# Patient Record
Sex: MALE | Race: BLACK OR AFRICAN AMERICAN | NOT HISPANIC OR LATINO | Employment: OTHER | URBAN - METROPOLITAN AREA
[De-identification: names, ages, dates, MRNs, and addresses within clinical notes are randomized per-mention and may not be internally consistent; named-entity substitution may affect disease eponyms.]

---

## 2018-01-01 ENCOUNTER — ANESTHESIA (OUTPATIENT)
Dept: INTENSIVE CARE | Facility: HOSPITAL | Age: 58
DRG: 870 | End: 2018-01-01
Payer: MEDICAID

## 2018-01-01 ENCOUNTER — HOSPITAL ENCOUNTER (INPATIENT)
Facility: HOSPITAL | Age: 58
LOS: 16 days | DRG: 870 | End: 2018-02-28
Attending: EMERGENCY MEDICINE | Admitting: HOSPITALIST
Payer: MEDICAID

## 2018-01-01 ENCOUNTER — ANESTHESIA EVENT (OUTPATIENT)
Dept: INTENSIVE CARE | Facility: HOSPITAL | Age: 58
DRG: 870 | End: 2018-01-01
Payer: MEDICAID

## 2018-01-01 VITALS
HEART RATE: 86 BPM | DIASTOLIC BLOOD PRESSURE: 59 MMHG | WEIGHT: 244.94 LBS | RESPIRATION RATE: 35 BRPM | TEMPERATURE: 98 F | OXYGEN SATURATION: 81 % | HEIGHT: 67 IN | SYSTOLIC BLOOD PRESSURE: 116 MMHG | BODY MASS INDEX: 38.44 KG/M2

## 2018-01-01 DIAGNOSIS — J96.02 ACUTE RESPIRATORY FAILURE WITH HYPOXIA AND HYPERCARBIA: ICD-10-CM

## 2018-01-01 DIAGNOSIS — J96.01 ACUTE RESPIRATORY FAILURE WITH HYPOXIA AND HYPERCARBIA: ICD-10-CM

## 2018-01-01 DIAGNOSIS — J18.9 PNEUMONIA OF LEFT LOWER LOBE DUE TO INFECTIOUS ORGANISM: Primary | ICD-10-CM

## 2018-01-01 DIAGNOSIS — E87.1 HYPONATREMIA: ICD-10-CM

## 2018-01-01 DIAGNOSIS — E87.6 HYPOKALEMIA: ICD-10-CM

## 2018-01-01 DIAGNOSIS — D61.818 PANCYTOPENIA: ICD-10-CM

## 2018-01-01 DIAGNOSIS — R94.31 QT PROLONGATION: ICD-10-CM

## 2018-01-01 DIAGNOSIS — D64.9 ANEMIA, UNSPECIFIED TYPE: ICD-10-CM

## 2018-01-01 DIAGNOSIS — R09.02 HYPOXIA: ICD-10-CM

## 2018-01-01 DIAGNOSIS — I50.30 (HFPEF) HEART FAILURE WITH PRESERVED EJECTION FRACTION: ICD-10-CM

## 2018-01-01 LAB
ABO + RH BLD: NORMAL
ACANTHOCYTES BLD QL SMEAR: PRESENT
ACANTHOCYTES BLD QL SMEAR: PRESENT
ACID FAST MOD KINY STN SPEC: NORMAL
ALBUMIN SERPL BCP-MCNC: 1.7 G/DL
ALBUMIN SERPL BCP-MCNC: 1.7 G/DL
ALBUMIN SERPL BCP-MCNC: 1.8 G/DL
ALBUMIN SERPL BCP-MCNC: 1.9 G/DL
ALBUMIN SERPL BCP-MCNC: 1.9 G/DL
ALBUMIN SERPL BCP-MCNC: 2 G/DL
ALBUMIN SERPL BCP-MCNC: 2 G/DL
ALBUMIN SERPL BCP-MCNC: 2.2 G/DL
ALBUMIN SERPL BCP-MCNC: 2.2 G/DL
ALBUMIN SERPL BCP-MCNC: 2.3 G/DL
ALBUMIN SERPL BCP-MCNC: 2.4 G/DL
ALBUMIN SERPL BCP-MCNC: 2.6 G/DL
ALBUMIN SERPL BCP-MCNC: 2.8 G/DL
ALBUMIN SERPL BCP-MCNC: 2.8 G/DL
ALBUMIN SERPL BCP-MCNC: 3.2 G/DL
ALLENS TEST: ABNORMAL
ALP SERPL-CCNC: 100 U/L
ALP SERPL-CCNC: 40 U/L
ALP SERPL-CCNC: 41 U/L
ALP SERPL-CCNC: 42 U/L
ALP SERPL-CCNC: 43 U/L
ALP SERPL-CCNC: 43 U/L
ALP SERPL-CCNC: 48 U/L
ALP SERPL-CCNC: 49 U/L
ALP SERPL-CCNC: 49 U/L
ALP SERPL-CCNC: 50 U/L
ALP SERPL-CCNC: 57 U/L
ALP SERPL-CCNC: 62 U/L
ALP SERPL-CCNC: 64 U/L
ALP SERPL-CCNC: 68 U/L
ALP SERPL-CCNC: 75 U/L
ALP SERPL-CCNC: 81 U/L
ALP SERPL-CCNC: 97 U/L
ALT SERPL W/O P-5'-P-CCNC: 14 U/L
ALT SERPL W/O P-5'-P-CCNC: 15 U/L
ALT SERPL W/O P-5'-P-CCNC: 16 U/L
ALT SERPL W/O P-5'-P-CCNC: 16 U/L
ALT SERPL W/O P-5'-P-CCNC: 17 U/L
ALT SERPL W/O P-5'-P-CCNC: 18 U/L
ALT SERPL W/O P-5'-P-CCNC: 19 U/L
ALT SERPL W/O P-5'-P-CCNC: 20 U/L
ALT SERPL W/O P-5'-P-CCNC: 22 U/L
ALT SERPL W/O P-5'-P-CCNC: 24 U/L
ALT SERPL W/O P-5'-P-CCNC: 24 U/L
ALT SERPL W/O P-5'-P-CCNC: 25 U/L
ALT SERPL W/O P-5'-P-CCNC: 30 U/L
ALT SERPL W/O P-5'-P-CCNC: 35 U/L
AMMONIA PLAS-SCNC: 49 UMOL/L
ANION GAP SERPL CALC-SCNC: 10 MMOL/L
ANION GAP SERPL CALC-SCNC: 11 MMOL/L
ANION GAP SERPL CALC-SCNC: 11 MMOL/L
ANION GAP SERPL CALC-SCNC: 12 MMOL/L
ANION GAP SERPL CALC-SCNC: 14 MMOL/L
ANION GAP SERPL CALC-SCNC: 15 MMOL/L
ANION GAP SERPL CALC-SCNC: 16 MMOL/L
ANION GAP SERPL CALC-SCNC: 17 MMOL/L
ANION GAP SERPL CALC-SCNC: 17 MMOL/L
ANION GAP SERPL CALC-SCNC: 18 MMOL/L
ANION GAP SERPL CALC-SCNC: 21 MMOL/L
ANION GAP SERPL CALC-SCNC: 4 MMOL/L
ANION GAP SERPL CALC-SCNC: 6 MMOL/L
ANION GAP SERPL CALC-SCNC: 7 MMOL/L
ANION GAP SERPL CALC-SCNC: 8 MMOL/L
ANION GAP SERPL CALC-SCNC: 9 MMOL/L
ANISOCYTOSIS BLD QL SMEAR: SLIGHT
AST SERPL-CCNC: 106 U/L
AST SERPL-CCNC: 111 U/L
AST SERPL-CCNC: 114 U/L
AST SERPL-CCNC: 114 U/L
AST SERPL-CCNC: 115 U/L
AST SERPL-CCNC: 118 U/L
AST SERPL-CCNC: 125 U/L
AST SERPL-CCNC: 146 U/L
AST SERPL-CCNC: 160 U/L
AST SERPL-CCNC: 162 U/L
AST SERPL-CCNC: 177 U/L
AST SERPL-CCNC: 74 U/L
AST SERPL-CCNC: 75 U/L
AST SERPL-CCNC: 81 U/L
AST SERPL-CCNC: 90 U/L
BACTERIA #/AREA URNS HPF: ABNORMAL /HPF
BACTERIA #/AREA URNS HPF: ABNORMAL /HPF
BACTERIA BAL AEROBE CULT: NORMAL
BACTERIA BLD CULT: NORMAL
BACTERIA SPEC AEROBE CULT: NORMAL
BACTERIA SPEC AEROBE CULT: NORMAL
BACTERIA UR CULT: NO GROWTH
BACTERIA UR CULT: NORMAL
BASOPHILS # BLD AUTO: 0 K/UL
BASOPHILS # BLD AUTO: 0.01 K/UL
BASOPHILS # BLD AUTO: 0.02 K/UL
BASOPHILS # BLD AUTO: 0.02 K/UL
BASOPHILS # BLD AUTO: 0.03 K/UL
BASOPHILS # BLD AUTO: 0.03 K/UL
BASOPHILS # BLD AUTO: 0.04 K/UL
BASOPHILS # BLD AUTO: 0.04 K/UL
BASOPHILS # BLD AUTO: 0.08 K/UL
BASOPHILS # BLD AUTO: ABNORMAL K/UL
BASOPHILS NFR BLD: 0 %
BASOPHILS NFR BLD: 0.1 %
BASOPHILS NFR BLD: 0.2 %
BASOPHILS NFR BLD: 0.2 %
BASOPHILS NFR BLD: 0.4 %
BASOPHILS NFR BLD: 0.8 %
BASOPHILS NFR BLD: 0.9 %
BASOPHILS NFR BLD: 1.2 %
BASOPHILS NFR BLD: 2.5 %
BILIRUB SERPL-MCNC: 0.2 MG/DL
BILIRUB SERPL-MCNC: 0.3 MG/DL
BILIRUB SERPL-MCNC: 0.5 MG/DL
BILIRUB SERPL-MCNC: 0.7 MG/DL
BILIRUB SERPL-MCNC: 0.8 MG/DL
BILIRUB SERPL-MCNC: 0.8 MG/DL
BILIRUB SERPL-MCNC: 1 MG/DL
BILIRUB SERPL-MCNC: 1.2 MG/DL
BILIRUB UR QL STRIP: NEGATIVE
BILIRUB UR QL STRIP: NEGATIVE
BLD GP AB SCN CELLS X3 SERPL QL: NORMAL
BLD PROD TYP BPU: NORMAL
BLOOD UNIT EXPIRATION DATE: NORMAL
BLOOD UNIT TYPE CODE: 5100
BLOOD UNIT TYPE CODE: 7300
BLOOD UNIT TYPE: NORMAL
BNP SERPL-MCNC: 33 PG/ML
BNP SERPL-MCNC: 40 PG/ML
BUN SERPL-MCNC: 10 MG/DL
BUN SERPL-MCNC: 112 MG/DL
BUN SERPL-MCNC: 127 MG/DL
BUN SERPL-MCNC: 13 MG/DL
BUN SERPL-MCNC: 14 MG/DL
BUN SERPL-MCNC: 142 MG/DL
BUN SERPL-MCNC: 168 MG/DL
BUN SERPL-MCNC: 186 MG/DL
BUN SERPL-MCNC: 186 MG/DL
BUN SERPL-MCNC: 28 MG/DL
BUN SERPL-MCNC: 41 MG/DL
BUN SERPL-MCNC: 42 MG/DL
BUN SERPL-MCNC: 43 MG/DL
BUN SERPL-MCNC: 49 MG/DL
BUN SERPL-MCNC: 7 MG/DL
BUN SERPL-MCNC: 78 MG/DL
BUN SERPL-MCNC: 8 MG/DL
BUN SERPL-MCNC: 9 MG/DL
BUN SERPL-MCNC: 90 MG/DL
BURR CELLS BLD QL SMEAR: ABNORMAL
C DIFF GDH STL QL: NEGATIVE
C DIFF TOX A+B STL QL IA: NEGATIVE
CALCIUM SERPL-MCNC: 6.8 MG/DL
CALCIUM SERPL-MCNC: 7.2 MG/DL
CALCIUM SERPL-MCNC: 7.2 MG/DL
CALCIUM SERPL-MCNC: 7.3 MG/DL
CALCIUM SERPL-MCNC: 7.4 MG/DL
CALCIUM SERPL-MCNC: 7.5 MG/DL
CALCIUM SERPL-MCNC: 7.6 MG/DL
CALCIUM SERPL-MCNC: 7.7 MG/DL
CALCIUM SERPL-MCNC: 7.8 MG/DL
CALCIUM SERPL-MCNC: 7.9 MG/DL
CALCIUM SERPL-MCNC: 8 MG/DL
CALCIUM SERPL-MCNC: 8.1 MG/DL
CALCIUM SERPL-MCNC: 8.3 MG/DL
CALCIUM SERPL-MCNC: 8.5 MG/DL
CHLORIDE SERPL-SCNC: 100 MMOL/L
CHLORIDE SERPL-SCNC: 102 MMOL/L
CHLORIDE SERPL-SCNC: 103 MMOL/L
CHLORIDE SERPL-SCNC: 104 MMOL/L
CHLORIDE SERPL-SCNC: 106 MMOL/L
CHLORIDE SERPL-SCNC: 85 MMOL/L
CHLORIDE SERPL-SCNC: 93 MMOL/L
CHLORIDE SERPL-SCNC: 95 MMOL/L
CHLORIDE SERPL-SCNC: 95 MMOL/L
CHLORIDE SERPL-SCNC: 97 MMOL/L
CHLORIDE SERPL-SCNC: 97 MMOL/L
CHLORIDE SERPL-SCNC: 98 MMOL/L
CLARITY UR: ABNORMAL
CLARITY UR: CLEAR
CO2 SERPL-SCNC: 16 MMOL/L
CO2 SERPL-SCNC: 16 MMOL/L
CO2 SERPL-SCNC: 20 MMOL/L
CO2 SERPL-SCNC: 22 MMOL/L
CO2 SERPL-SCNC: 23 MMOL/L
CO2 SERPL-SCNC: 24 MMOL/L
CO2 SERPL-SCNC: 25 MMOL/L
CO2 SERPL-SCNC: 26 MMOL/L
CO2 SERPL-SCNC: 26 MMOL/L
CO2 SERPL-SCNC: 27 MMOL/L
CO2 SERPL-SCNC: 28 MMOL/L
CO2 SERPL-SCNC: 32 MMOL/L
CODING SYSTEM: NORMAL
COLOR UR: ABNORMAL
COLOR UR: YELLOW
CORTIS SERPL-MCNC: 15 UG/DL
CREAT SERPL-MCNC: 0.8 MG/DL
CREAT SERPL-MCNC: 0.9 MG/DL
CREAT SERPL-MCNC: 0.9 MG/DL
CREAT SERPL-MCNC: 1 MG/DL
CREAT SERPL-MCNC: 1.1 MG/DL
CREAT SERPL-MCNC: 1.7 MG/DL
CREAT SERPL-MCNC: 1.7 MG/DL
CREAT SERPL-MCNC: 1.8 MG/DL
CREAT SERPL-MCNC: 3.2 MG/DL
CREAT SERPL-MCNC: 3.8 MG/DL
CREAT SERPL-MCNC: 4.8 MG/DL
CREAT SERPL-MCNC: 5.6 MG/DL
CREAT SERPL-MCNC: 6.2 MG/DL
CREAT SERPL-MCNC: 7 MG/DL
CREAT SERPL-MCNC: 7.6 MG/DL
CREAT SERPL-MCNC: 7.7 MG/DL
CREAT UR-MCNC: 128.9 MG/DL
DACRYOCYTES BLD QL SMEAR: ABNORMAL
DELSYS: ABNORMAL
DIASTOLIC DYSFUNCTION: NO
DIFFERENTIAL METHOD: ABNORMAL
DISPENSE STATUS: NORMAL
EOSINOPHIL # BLD AUTO: 0 K/UL
EOSINOPHIL # BLD AUTO: ABNORMAL K/UL
EOSINOPHIL NFR BLD: 0 %
EOSINOPHIL URNS QL WRIGHT STN: NORMAL
EP: 8
EP: 8
ERYTHROCYTE [DISTWIDTH] IN BLOOD BY AUTOMATED COUNT: 15.2 %
ERYTHROCYTE [DISTWIDTH] IN BLOOD BY AUTOMATED COUNT: 15.4 %
ERYTHROCYTE [DISTWIDTH] IN BLOOD BY AUTOMATED COUNT: 16 %
ERYTHROCYTE [DISTWIDTH] IN BLOOD BY AUTOMATED COUNT: 16.1 %
ERYTHROCYTE [DISTWIDTH] IN BLOOD BY AUTOMATED COUNT: 16.2 %
ERYTHROCYTE [DISTWIDTH] IN BLOOD BY AUTOMATED COUNT: 16.8 %
ERYTHROCYTE [DISTWIDTH] IN BLOOD BY AUTOMATED COUNT: 17.1 %
ERYTHROCYTE [DISTWIDTH] IN BLOOD BY AUTOMATED COUNT: 17.5 %
ERYTHROCYTE [DISTWIDTH] IN BLOOD BY AUTOMATED COUNT: 17.9 %
ERYTHROCYTE [DISTWIDTH] IN BLOOD BY AUTOMATED COUNT: 18.8 %
ERYTHROCYTE [DISTWIDTH] IN BLOOD BY AUTOMATED COUNT: 19 %
ERYTHROCYTE [DISTWIDTH] IN BLOOD BY AUTOMATED COUNT: 19.4 %
ERYTHROCYTE [DISTWIDTH] IN BLOOD BY AUTOMATED COUNT: 19.9 %
ERYTHROCYTE [DISTWIDTH] IN BLOOD BY AUTOMATED COUNT: 20 %
ERYTHROCYTE [DISTWIDTH] IN BLOOD BY AUTOMATED COUNT: 20.2 %
ERYTHROCYTE [DISTWIDTH] IN BLOOD BY AUTOMATED COUNT: 20.6 %
ERYTHROCYTE [SEDIMENTATION RATE] IN BLOOD BY WESTERGREN METHOD: 15 MM/H
ERYTHROCYTE [SEDIMENTATION RATE] IN BLOOD BY WESTERGREN METHOD: 20 MM/H
ERYTHROCYTE [SEDIMENTATION RATE] IN BLOOD BY WESTERGREN METHOD: 21 MM/H
ERYTHROCYTE [SEDIMENTATION RATE] IN BLOOD BY WESTERGREN METHOD: 24 MM/H
ERYTHROCYTE [SEDIMENTATION RATE] IN BLOOD BY WESTERGREN METHOD: 24 MM/H
ERYTHROCYTE [SEDIMENTATION RATE] IN BLOOD BY WESTERGREN METHOD: 28 MM/H
EST. GFR  (AFRICAN AMERICAN): 10 ML/MIN/1.73 M^2
EST. GFR  (AFRICAN AMERICAN): 12 ML/MIN/1.73 M^2
EST. GFR  (AFRICAN AMERICAN): 14 ML/MIN/1.73 M^2
EST. GFR  (AFRICAN AMERICAN): 19 ML/MIN/1.73 M^2
EST. GFR  (AFRICAN AMERICAN): 23 ML/MIN/1.73 M^2
EST. GFR  (AFRICAN AMERICAN): 46 ML/MIN/1.73 M^2
EST. GFR  (AFRICAN AMERICAN): 50 ML/MIN/1.73 M^2
EST. GFR  (AFRICAN AMERICAN): 50 ML/MIN/1.73 M^2
EST. GFR  (AFRICAN AMERICAN): 8 ML/MIN/1.73 M^2
EST. GFR  (AFRICAN AMERICAN): 8 ML/MIN/1.73 M^2
EST. GFR  (AFRICAN AMERICAN): 9 ML/MIN/1.73 M^2
EST. GFR  (AFRICAN AMERICAN): >60 ML/MIN/1.73 M^2
EST. GFR  (NON AFRICAN AMERICAN): 10 ML/MIN/1.73 M^2
EST. GFR  (NON AFRICAN AMERICAN): 12 ML/MIN/1.73 M^2
EST. GFR  (NON AFRICAN AMERICAN): 16 ML/MIN/1.73 M^2
EST. GFR  (NON AFRICAN AMERICAN): 20 ML/MIN/1.73 M^2
EST. GFR  (NON AFRICAN AMERICAN): 40 ML/MIN/1.73 M^2
EST. GFR  (NON AFRICAN AMERICAN): 43 ML/MIN/1.73 M^2
EST. GFR  (NON AFRICAN AMERICAN): 43 ML/MIN/1.73 M^2
EST. GFR  (NON AFRICAN AMERICAN): 7 ML/MIN/1.73 M^2
EST. GFR  (NON AFRICAN AMERICAN): 7 ML/MIN/1.73 M^2
EST. GFR  (NON AFRICAN AMERICAN): 8 ML/MIN/1.73 M^2
EST. GFR  (NON AFRICAN AMERICAN): 9 ML/MIN/1.73 M^2
EST. GFR  (NON AFRICAN AMERICAN): >60 ML/MIN/1.73 M^2
FERRITIN SERPL-MCNC: 198 NG/ML
FIO2: 100
FIO2: 50
FIO2: 60
FIO2: 70
FIO2: 80
FLOW: 15
FLUAV AG SPEC QL IA: NEGATIVE
FLUBV AG SPEC QL IA: NEGATIVE
FOLATE SERPL-MCNC: 3.8 NG/ML
GIANT PLATELETS BLD QL SMEAR: PRESENT
GLOBAL PERICARDIAL EFFUSION: NORMAL
GLUCOSE SERPL-MCNC: 100 MG/DL
GLUCOSE SERPL-MCNC: 103 MG/DL
GLUCOSE SERPL-MCNC: 110 MG/DL
GLUCOSE SERPL-MCNC: 114 MG/DL
GLUCOSE SERPL-MCNC: 116 MG/DL
GLUCOSE SERPL-MCNC: 125 MG/DL
GLUCOSE SERPL-MCNC: 129 MG/DL
GLUCOSE SERPL-MCNC: 131 MG/DL
GLUCOSE SERPL-MCNC: 133 MG/DL
GLUCOSE SERPL-MCNC: 135 MG/DL
GLUCOSE SERPL-MCNC: 136 MG/DL
GLUCOSE SERPL-MCNC: 144 MG/DL
GLUCOSE SERPL-MCNC: 167 MG/DL
GLUCOSE SERPL-MCNC: 71 MG/DL
GLUCOSE SERPL-MCNC: 80 MG/DL
GLUCOSE SERPL-MCNC: 83 MG/DL
GLUCOSE SERPL-MCNC: 90 MG/DL
GLUCOSE SERPL-MCNC: 91 MG/DL
GLUCOSE SERPL-MCNC: 92 MG/DL
GLUCOSE SERPL-MCNC: 97 MG/DL
GLUCOSE SERPL-MCNC: 99 MG/DL
GLUCOSE UR QL STRIP: NEGATIVE
GLUCOSE UR QL STRIP: NEGATIVE
GRAM STN SPEC: NORMAL
GRAN CASTS #/AREA URNS LPF: 5 /LPF
HAV IGM SERPL QL IA: NEGATIVE
HAV IGM SERPL QL IA: NEGATIVE
HBV CORE IGM SERPL QL IA: NEGATIVE
HBV CORE IGM SERPL QL IA: NEGATIVE
HBV SURFACE AG SERPL QL IA: NEGATIVE
HBV SURFACE AG SERPL QL IA: NEGATIVE
HCO3 UR-SCNC: 17 MMOL/L (ref 24–28)
HCO3 UR-SCNC: 18.9 MMOL/L (ref 24–28)
HCO3 UR-SCNC: 20.3 MMOL/L (ref 24–28)
HCO3 UR-SCNC: 23.5 MMOL/L (ref 24–28)
HCO3 UR-SCNC: 23.8 MMOL/L (ref 24–28)
HCO3 UR-SCNC: 24.3 MMOL/L (ref 24–28)
HCO3 UR-SCNC: 24.5 MMOL/L (ref 24–28)
HCO3 UR-SCNC: 25.5 MMOL/L (ref 24–28)
HCO3 UR-SCNC: 27.4 MMOL/L (ref 24–28)
HCO3 UR-SCNC: 27.8 MMOL/L (ref 24–28)
HCO3 UR-SCNC: 28 MMOL/L (ref 24–28)
HCO3 UR-SCNC: 28.1 MMOL/L (ref 24–28)
HCO3 UR-SCNC: 30.1 MMOL/L (ref 24–28)
HCO3 UR-SCNC: 30.3 MMOL/L (ref 24–28)
HCO3 UR-SCNC: 30.4 MMOL/L (ref 24–28)
HCO3 UR-SCNC: 31.6 MMOL/L (ref 24–28)
HCT VFR BLD AUTO: 19.3 %
HCT VFR BLD AUTO: 22.1 %
HCT VFR BLD AUTO: 22.7 %
HCT VFR BLD AUTO: 23.7 %
HCT VFR BLD AUTO: 24 %
HCT VFR BLD AUTO: 24.2 %
HCT VFR BLD AUTO: 24.5 %
HCT VFR BLD AUTO: 25.2 %
HCT VFR BLD AUTO: 26.1 %
HCT VFR BLD AUTO: 26.3 %
HCT VFR BLD AUTO: 26.4 %
HCT VFR BLD AUTO: 26.7 %
HCT VFR BLD AUTO: 27.3 %
HCT VFR BLD AUTO: 27.7 %
HCT VFR BLD AUTO: 28.5 %
HCT VFR BLD AUTO: 31 %
HCT VFR BLD AUTO: 34.7 %
HCT VFR BLD AUTO: 35.6 %
HCV AB SERPL QL IA: POSITIVE
HCV AB SERPL QL IA: POSITIVE
HCV LOG: 1.88 LOG (10) IU/ML
HCV RNA QUANT PCR: 75 IU/ML
HCV, QUALITATIVE: DETECTED IU/ML
HGB BLD-MCNC: 10.1 G/DL
HGB BLD-MCNC: 11.7 G/DL
HGB BLD-MCNC: 12.1 G/DL
HGB BLD-MCNC: 6.6 G/DL
HGB BLD-MCNC: 7.3 G/DL
HGB BLD-MCNC: 7.5 G/DL
HGB BLD-MCNC: 7.9 G/DL
HGB BLD-MCNC: 8.4 G/DL
HGB BLD-MCNC: 8.7 G/DL
HGB BLD-MCNC: 8.9 G/DL
HGB BLD-MCNC: 9.1 G/DL
HGB BLD-MCNC: 9.1 G/DL
HGB BLD-MCNC: 9.3 G/DL
HGB BLD-MCNC: 9.4 G/DL
HGB UR QL STRIP: ABNORMAL
HGB UR QL STRIP: ABNORMAL
HIV1+2 IGG SERPL QL IA.RAPID: NEGATIVE
HYALINE CASTS #/AREA URNS LPF: 1 /LPF
HYALINE CASTS #/AREA URNS LPF: 5 /LPF
HYPOCHROMIA BLD QL SMEAR: ABNORMAL
INR PPP: 0.9
INR PPP: 0.9
INR PPP: 1
IP: 16
IP: 16
IRON SERPL-MCNC: 25 UG/DL
KETONES UR QL STRIP: ABNORMAL
KETONES UR QL STRIP: NEGATIVE
L PNEUMO AG UR QL IA: NOT DETECTED
LACTATE SERPL-SCNC: 0.8 MMOL/L
LDH SERPL L TO P-CCNC: 371 U/L
LEUKOCYTE ESTERASE UR QL STRIP: NEGATIVE
LEUKOCYTE ESTERASE UR QL STRIP: NEGATIVE
LYMPHOCYTES # BLD AUTO: 0.2 K/UL
LYMPHOCYTES # BLD AUTO: 0.3 K/UL
LYMPHOCYTES # BLD AUTO: 0.4 K/UL
LYMPHOCYTES # BLD AUTO: 0.4 K/UL
LYMPHOCYTES # BLD AUTO: 0.5 K/UL
LYMPHOCYTES # BLD AUTO: 0.6 K/UL
LYMPHOCYTES # BLD AUTO: 0.7 K/UL
LYMPHOCYTES # BLD AUTO: 1.1 K/UL
LYMPHOCYTES # BLD AUTO: ABNORMAL K/UL
LYMPHOCYTES NFR BLD: 10 %
LYMPHOCYTES NFR BLD: 10.4 %
LYMPHOCYTES NFR BLD: 11 %
LYMPHOCYTES NFR BLD: 11.8 %
LYMPHOCYTES NFR BLD: 12 %
LYMPHOCYTES NFR BLD: 14 %
LYMPHOCYTES NFR BLD: 14.1 %
LYMPHOCYTES NFR BLD: 14.2 %
LYMPHOCYTES NFR BLD: 14.4 %
LYMPHOCYTES NFR BLD: 15 %
LYMPHOCYTES NFR BLD: 16 %
LYMPHOCYTES NFR BLD: 2 %
LYMPHOCYTES NFR BLD: 31 %
LYMPHOCYTES NFR BLD: 4 %
LYMPHOCYTES NFR BLD: 4.3 %
LYMPHOCYTES NFR BLD: 5 %
LYMPHOCYTES NFR BLD: 5 %
LYMPHOCYTES NFR BLD: 9.6 %
MAGNESIUM SERPL-MCNC: 1.5 MG/DL
MAGNESIUM SERPL-MCNC: 1.7 MG/DL
MAGNESIUM SERPL-MCNC: 2.1 MG/DL
MCH RBC QN AUTO: 24 PG
MCH RBC QN AUTO: 24.1 PG
MCH RBC QN AUTO: 24.3 PG
MCH RBC QN AUTO: 24.4 PG
MCH RBC QN AUTO: 24.9 PG
MCH RBC QN AUTO: 25.5 PG
MCH RBC QN AUTO: 25.6 PG
MCH RBC QN AUTO: 25.9 PG
MCH RBC QN AUTO: 26.2 PG
MCH RBC QN AUTO: 26.3 PG
MCH RBC QN AUTO: 26.4 PG
MCH RBC QN AUTO: 26.7 PG
MCH RBC QN AUTO: 26.7 PG
MCHC RBC AUTO-ENTMCNC: 32.6 G/DL
MCHC RBC AUTO-ENTMCNC: 32.6 G/DL
MCHC RBC AUTO-ENTMCNC: 32.9 G/DL
MCHC RBC AUTO-ENTMCNC: 32.9 G/DL
MCHC RBC AUTO-ENTMCNC: 33 G/DL
MCHC RBC AUTO-ENTMCNC: 33.3 G/DL
MCHC RBC AUTO-ENTMCNC: 33.3 G/DL
MCHC RBC AUTO-ENTMCNC: 33.7 G/DL
MCHC RBC AUTO-ENTMCNC: 33.7 G/DL
MCHC RBC AUTO-ENTMCNC: 34 G/DL
MCHC RBC AUTO-ENTMCNC: 34.1 G/DL
MCHC RBC AUTO-ENTMCNC: 34.1 G/DL
MCHC RBC AUTO-ENTMCNC: 34.2 G/DL
MCHC RBC AUTO-ENTMCNC: 34.3 G/DL
MCHC RBC AUTO-ENTMCNC: 34.5 G/DL
MCHC RBC AUTO-ENTMCNC: 34.6 G/DL
MCV RBC AUTO: 72 FL
MCV RBC AUTO: 73 FL
MCV RBC AUTO: 74 FL
MCV RBC AUTO: 75 FL
MCV RBC AUTO: 75 FL
MCV RBC AUTO: 76 FL
MCV RBC AUTO: 76 FL
MCV RBC AUTO: 77 FL
MCV RBC AUTO: 77 FL
MCV RBC AUTO: 78 FL
MCV RBC AUTO: 79 FL
MCV RBC AUTO: 80 FL
MCV RBC AUTO: 81 FL
MCV RBC AUTO: 81 FL
MICROSCOPIC COMMENT: ABNORMAL
MICROSCOPIC COMMENT: ABNORMAL
MIN VOL: 10
MIN VOL: 10.8
MIN VOL: 10.8
MIN VOL: 11.4
MIN VOL: 11.8
MIN VOL: 7.6
MIN VOL: 8.1
MIN VOL: 9
MITOGEN NIL: -0.01 IU/ML
MODE: ABNORMAL
MONOCYTES # BLD AUTO: 0.1 K/UL
MONOCYTES # BLD AUTO: 0.2 K/UL
MONOCYTES # BLD AUTO: 1.3 K/UL
MONOCYTES # BLD AUTO: 1.7 K/UL
MONOCYTES # BLD AUTO: 2 K/UL
MONOCYTES # BLD AUTO: ABNORMAL K/UL
MONOCYTES NFR BLD: 1 %
MONOCYTES NFR BLD: 11.5 %
MONOCYTES NFR BLD: 2 %
MONOCYTES NFR BLD: 3 %
MONOCYTES NFR BLD: 3.1 %
MONOCYTES NFR BLD: 4 %
MONOCYTES NFR BLD: 5 %
MONOCYTES NFR BLD: 6 %
MONOCYTES NFR BLD: 7.1 %
MONOCYTES NFR BLD: 8 %
MONOCYTES NFR BLD: 8.6 %
MONOCYTES NFR BLD: 8.8 %
MONOCYTES NFR BLD: 8.9 %
MONOCYTES NFR BLD: 9 %
NEUTROPHILS # BLD AUTO: 0.5 K/UL
NEUTROPHILS # BLD AUTO: 1.7 K/UL
NEUTROPHILS # BLD AUTO: 1.9 K/UL
NEUTROPHILS # BLD AUTO: 12.4 K/UL
NEUTROPHILS # BLD AUTO: 14.1 K/UL
NEUTROPHILS # BLD AUTO: 19.2 K/UL
NEUTROPHILS # BLD AUTO: 2 K/UL
NEUTROPHILS # BLD AUTO: 2 K/UL
NEUTROPHILS # BLD AUTO: 2.1 K/UL
NEUTROPHILS # BLD AUTO: 2.5 K/UL
NEUTROPHILS # BLD AUTO: 2.8 K/UL
NEUTROPHILS NFR BLD: 64.3 %
NEUTROPHILS NFR BLD: 74 %
NEUTROPHILS NFR BLD: 77 %
NEUTROPHILS NFR BLD: 77 %
NEUTROPHILS NFR BLD: 77.3 %
NEUTROPHILS NFR BLD: 78 %
NEUTROPHILS NFR BLD: 79.8 %
NEUTROPHILS NFR BLD: 81.5 %
NEUTROPHILS NFR BLD: 81.6 %
NEUTROPHILS NFR BLD: 82 %
NEUTROPHILS NFR BLD: 85 %
NEUTROPHILS NFR BLD: 85.4 %
NEUTROPHILS NFR BLD: 85.7 %
NEUTROPHILS NFR BLD: 86.9 %
NEUTROPHILS NFR BLD: 87 %
NEUTROPHILS NFR BLD: 87.5 %
NEUTROPHILS NFR BLD: 91.5 %
NEUTROPHILS NFR BLD: 93 %
NEUTS BAND NFR BLD MANUAL: 12 %
NEUTS BAND NFR BLD MANUAL: 3 %
NEUTS BAND NFR BLD MANUAL: 4 %
NEUTS BAND NFR BLD MANUAL: 4 %
NEUTS BAND NFR BLD MANUAL: 6 %
NEUTS BAND NFR BLD MANUAL: 7 %
NEUTS BAND NFR BLD MANUAL: 7 %
NIL: 0.89 IU/ML
NITRITE UR QL STRIP: NEGATIVE
NITRITE UR QL STRIP: NEGATIVE
OVALOCYTES BLD QL SMEAR: ABNORMAL
PCO2 BLDA: 33.6 MMHG (ref 35–45)
PCO2 BLDA: 33.9 MMHG (ref 35–45)
PCO2 BLDA: 36.8 MMHG (ref 35–45)
PCO2 BLDA: 37.6 MMHG (ref 35–45)
PCO2 BLDA: 38 MMHG (ref 35–45)
PCO2 BLDA: 39.3 MMHG (ref 35–45)
PCO2 BLDA: 41.1 MMHG (ref 35–45)
PCO2 BLDA: 46.1 MMHG (ref 35–45)
PCO2 BLDA: 46.2 MMHG (ref 35–45)
PCO2 BLDA: 47.4 MMHG (ref 35–45)
PCO2 BLDA: 51.5 MMHG (ref 35–45)
PCO2 BLDA: 54.2 MMHG (ref 35–45)
PCO2 BLDA: 54.5 MMHG (ref 35–45)
PCO2 BLDA: 56.4 MMHG (ref 35–45)
PCO2 BLDA: 57.2 MMHG (ref 35–45)
PCO2 BLDA: 58.5 MMHG (ref 35–45)
PEEP: 10
PEEP: 14
PEEP: 4
PEEP: 5
PH SMN: 7.26 [PH] (ref 7.35–7.45)
PH SMN: 7.27 [PH] (ref 7.35–7.45)
PH SMN: 7.29 [PH] (ref 7.35–7.45)
PH SMN: 7.3 [PH] (ref 7.35–7.45)
PH SMN: 7.32 [PH] (ref 7.35–7.45)
PH SMN: 7.33 [PH] (ref 7.35–7.45)
PH SMN: 7.35 [PH] (ref 7.35–7.45)
PH SMN: 7.38 [PH] (ref 7.35–7.45)
PH SMN: 7.43 [PH] (ref 7.35–7.45)
PH SMN: 7.44 [PH] (ref 7.35–7.45)
PH SMN: 7.46 [PH] (ref 7.35–7.45)
PH SMN: 7.47 [PH] (ref 7.35–7.45)
PH SMN: 7.48 [PH] (ref 7.35–7.45)
PH UR STRIP: 5 [PH] (ref 5–8)
PH UR STRIP: 6 [PH] (ref 5–8)
PHOSPHATE SERPL-MCNC: 1.1 MG/DL
PHOSPHATE SERPL-MCNC: 2.9 MG/DL
PHOSPHATE SERPL-MCNC: 6.7 MG/DL
PHOSPHATE SERPL-MCNC: 6.9 MG/DL
PIP: 23
PIP: 28
PIP: 29
PIP: 30
PIP: 31
PIP: 32
PIP: 33
PLATELET # BLD AUTO: 28 K/UL
PLATELET # BLD AUTO: 34 K/UL
PLATELET # BLD AUTO: 37 K/UL
PLATELET # BLD AUTO: 38 K/UL
PLATELET # BLD AUTO: 41 K/UL
PLATELET # BLD AUTO: 44 K/UL
PLATELET # BLD AUTO: 45 K/UL
PLATELET # BLD AUTO: 46 K/UL
PLATELET # BLD AUTO: 57 K/UL
PLATELET # BLD AUTO: 57 K/UL
PLATELET # BLD AUTO: 58 K/UL
PLATELET # BLD AUTO: 67 K/UL
PLATELET # BLD AUTO: 68 K/UL
PLATELET # BLD AUTO: 74 K/UL
PLATELET # BLD AUTO: 75 K/UL
PLATELET # BLD AUTO: 79 K/UL
PLATELET BLD QL SMEAR: ABNORMAL
PMV BLD AUTO: ABNORMAL FL
PO2 BLDA: 111 MMHG (ref 80–100)
PO2 BLDA: 130 MMHG (ref 80–100)
PO2 BLDA: 153 MMHG (ref 80–100)
PO2 BLDA: 57 MMHG (ref 80–100)
PO2 BLDA: 62 MMHG (ref 80–100)
PO2 BLDA: 63 MMHG (ref 80–100)
PO2 BLDA: 65 MMHG (ref 80–100)
PO2 BLDA: 65 MMHG (ref 80–100)
PO2 BLDA: 66 MMHG (ref 80–100)
PO2 BLDA: 68 MMHG (ref 80–100)
PO2 BLDA: 68 MMHG (ref 80–100)
PO2 BLDA: 69 MMHG (ref 80–100)
PO2 BLDA: 73 MMHG (ref 80–100)
PO2 BLDA: 76 MMHG (ref 80–100)
PO2 BLDA: 82 MMHG (ref 80–100)
PO2 BLDA: 92 MMHG (ref 80–100)
POC BE: -3 MMOL/L
POC BE: -3 MMOL/L
POC BE: -5 MMOL/L
POC BE: -7 MMOL/L
POC BE: -9 MMOL/L
POC BE: 1 MMOL/L
POC BE: 2 MMOL/L
POC BE: 4 MMOL/L
POC BE: 4 MMOL/L
POC BE: 6 MMOL/L
POC BE: 7 MMOL/L
POC SATURATED O2: 88 % (ref 95–100)
POC SATURATED O2: 89 % (ref 95–100)
POC SATURATED O2: 90 % (ref 95–100)
POC SATURATED O2: 90 % (ref 95–100)
POC SATURATED O2: 92 % (ref 95–100)
POC SATURATED O2: 92 % (ref 95–100)
POC SATURATED O2: 93 % (ref 95–100)
POC SATURATED O2: 94 % (ref 95–100)
POC SATURATED O2: 95 % (ref 95–100)
POC SATURATED O2: 95 % (ref 95–100)
POC SATURATED O2: 97 % (ref 95–100)
POC SATURATED O2: 98 % (ref 95–100)
POC SATURATED O2: 99 % (ref 95–100)
POC SATURATED O2: 99 % (ref 95–100)
POC TCO2: 18 MMOL/L (ref 23–27)
POC TCO2: 20 MMOL/L (ref 23–27)
POC TCO2: 22 MMOL/L (ref 23–27)
POC TCO2: 25 MMOL/L (ref 23–27)
POC TCO2: 26 MMOL/L (ref 23–27)
POC TCO2: 27 MMOL/L (ref 23–27)
POC TCO2: 29 MMOL/L (ref 23–27)
POC TCO2: 30 MMOL/L (ref 23–27)
POC TCO2: 32 MMOL/L (ref 23–27)
POC TCO2: 33 MMOL/L (ref 23–27)
POCT GLUCOSE: 106 MG/DL (ref 70–110)
POCT GLUCOSE: 174 MG/DL (ref 70–110)
POCT GLUCOSE: 74 MG/DL (ref 70–110)
POCT GLUCOSE: 78 MG/DL (ref 70–110)
POCT GLUCOSE: 79 MG/DL (ref 70–110)
POIKILOCYTOSIS BLD QL SMEAR: SLIGHT
POLYCHROMASIA BLD QL SMEAR: ABNORMAL
POTASSIUM SERPL-SCNC: 2.4 MMOL/L
POTASSIUM SERPL-SCNC: 2.4 MMOL/L
POTASSIUM SERPL-SCNC: 3 MMOL/L
POTASSIUM SERPL-SCNC: 3.4 MMOL/L
POTASSIUM SERPL-SCNC: 3.7 MMOL/L
POTASSIUM SERPL-SCNC: 3.8 MMOL/L
POTASSIUM SERPL-SCNC: 3.8 MMOL/L
POTASSIUM SERPL-SCNC: 3.9 MMOL/L
POTASSIUM SERPL-SCNC: 3.9 MMOL/L
POTASSIUM SERPL-SCNC: 4 MMOL/L
POTASSIUM SERPL-SCNC: 4.1 MMOL/L
POTASSIUM SERPL-SCNC: 4.2 MMOL/L
POTASSIUM SERPL-SCNC: 4.5 MMOL/L
POTASSIUM SERPL-SCNC: 4.5 MMOL/L
POTASSIUM SERPL-SCNC: 4.6 MMOL/L
POTASSIUM SERPL-SCNC: 4.9 MMOL/L
POTASSIUM SERPL-SCNC: 5.2 MMOL/L
POTASSIUM SERPL-SCNC: 7 MMOL/L
POTASSIUM SERPL-SCNC: 7.2 MMOL/L
PROT SERPL-MCNC: 5.4 G/DL
PROT SERPL-MCNC: 5.6 G/DL
PROT SERPL-MCNC: 5.6 G/DL
PROT SERPL-MCNC: 6 G/DL
PROT SERPL-MCNC: 6 G/DL
PROT SERPL-MCNC: 6.1 G/DL
PROT SERPL-MCNC: 6.2 G/DL
PROT SERPL-MCNC: 6.3 G/DL
PROT SERPL-MCNC: 6.5 G/DL
PROT SERPL-MCNC: 6.7 G/DL
PROT SERPL-MCNC: 6.8 G/DL
PROT SERPL-MCNC: 6.8 G/DL
PROT SERPL-MCNC: 7.7 G/DL
PROT UR QL STRIP: ABNORMAL
PROT UR QL STRIP: NEGATIVE
PROTHROMBIN TIME: 10 SEC
PROTHROMBIN TIME: 10.2 SEC
PROTHROMBIN TIME: 10.5 SEC
PROTHROMBIN TIME: 10.7 SEC
PROTHROMBIN TIME: 9.8 SEC
PROTHROMBIN TIME: 9.9 SEC
RBC # BLD AUTO: 2.59 M/UL
RBC # BLD AUTO: 3.03 M/UL
RBC # BLD AUTO: 3.08 M/UL
RBC # BLD AUTO: 3.24 M/UL
RBC # BLD AUTO: 3.24 M/UL
RBC # BLD AUTO: 3.28 M/UL
RBC # BLD AUTO: 3.29 M/UL
RBC # BLD AUTO: 3.31 M/UL
RBC # BLD AUTO: 3.4 M/UL
RBC # BLD AUTO: 3.44 M/UL
RBC # BLD AUTO: 3.46 M/UL
RBC # BLD AUTO: 3.46 M/UL
RBC # BLD AUTO: 3.52 M/UL
RBC # BLD AUTO: 3.66 M/UL
RBC # BLD AUTO: 3.74 M/UL
RBC # BLD AUTO: 3.82 M/UL
RBC # BLD AUTO: 4.39 M/UL
RBC # BLD AUTO: 4.6 M/UL
RBC #/AREA URNS HPF: 1 /HPF (ref 0–4)
RBC #/AREA URNS HPF: 2 /HPF (ref 0–4)
RETICS/RBC NFR AUTO: 0.4 %
RETIRED EF AND QEF - SEE NOTES: 55 (ref 55–65)
SAMPLE: ABNORMAL
SATURATED IRON: 9 %
SCHISTOCYTES BLD QL SMEAR: ABNORMAL
SCHISTOCYTES BLD QL SMEAR: ABNORMAL
SCHISTOCYTES BLD QL SMEAR: PRESENT
SITE: ABNORMAL
SODIUM SERPL-SCNC: 127 MMOL/L
SODIUM SERPL-SCNC: 127 MMOL/L
SODIUM SERPL-SCNC: 130 MMOL/L
SODIUM SERPL-SCNC: 130 MMOL/L
SODIUM SERPL-SCNC: 131 MMOL/L
SODIUM SERPL-SCNC: 132 MMOL/L
SODIUM SERPL-SCNC: 133 MMOL/L
SODIUM SERPL-SCNC: 134 MMOL/L
SODIUM SERPL-SCNC: 136 MMOL/L
SODIUM SERPL-SCNC: 137 MMOL/L
SODIUM SERPL-SCNC: 138 MMOL/L
SODIUM SERPL-SCNC: 140 MMOL/L
SODIUM SERPL-SCNC: 141 MMOL/L
SODIUM SERPL-SCNC: 143 MMOL/L
SODIUM SERPL-SCNC: 143 MMOL/L
SODIUM SERPL-SCNC: 144 MMOL/L
SODIUM SERPL-SCNC: 145 MMOL/L
SODIUM UR-SCNC: <20 MMOL/L
SP GR UR STRIP: 1.01 (ref 1–1.03)
SP GR UR STRIP: 1.01 (ref 1–1.03)
SP02: 91
SP02: 92
SP02: 93
SP02: 94
SP02: 96
SPECIMEN SOURCE: NORMAL
SQUAMOUS #/AREA URNS HPF: 3 /HPF
TARGETS BLD QL SMEAR: ABNORMAL
TB ANTIGEN NIL: 0.01 IU/ML
TB ANTIGEN: 0.9 IU/ML
TB GOLD: ABNORMAL
TOTAL IRON BINDING CAPACITY: 283 UG/DL
TRANS ERYTHROCYTES VOL PATIENT: NORMAL ML
TRANS PLATPHERESIS VOL PATIENT: NORMAL ML
TRANS PLATPHERESIS VOL PATIENT: NORMAL ML
TRANSFERRIN SERPL-MCNC: 191 MG/DL
TRICUSPID VALVE REGURGITATION: NORMAL
URATE SERPL-MCNC: 5.8 MG/DL
URN SPEC COLLECT METH UR: ABNORMAL
URN SPEC COLLECT METH UR: ABNORMAL
UROBILINOGEN UR STRIP-ACNC: NEGATIVE EU/DL
UROBILINOGEN UR STRIP-ACNC: NEGATIVE EU/DL
VANCOMYCIN SERPL-MCNC: 20.1 UG/ML
VANCOMYCIN SERPL-MCNC: 21.9 UG/ML
VANCOMYCIN SERPL-MCNC: 30 UG/ML
VANCOMYCIN SERPL-MCNC: 32.5 UG/ML
VANCOMYCIN SERPL-MCNC: 41 UG/ML
VANCOMYCIN SERPL-MCNC: 46.1 UG/ML
VANCOMYCIN SERPL-MCNC: 50.1 UG/ML
VANCOMYCIN TROUGH SERPL-MCNC: 16.9 UG/ML
VANCOMYCIN TROUGH SERPL-MCNC: 40.9 UG/ML
VANCOMYCIN TROUGH SERPL-MCNC: 45.8 UG/ML
VIT B12 SERPL-MCNC: 238 PG/ML
VT: 430
VT: 450
WBC # BLD AUTO: 0.84 K/UL
WBC # BLD AUTO: 1.53 K/UL
WBC # BLD AUTO: 1.8 K/UL
WBC # BLD AUTO: 11.53 K/UL
WBC # BLD AUTO: 13.53 K/UL
WBC # BLD AUTO: 14.75 K/UL
WBC # BLD AUTO: 16.08 K/UL
WBC # BLD AUTO: 2.2 K/UL
WBC # BLD AUTO: 2.29 K/UL
WBC # BLD AUTO: 2.48 K/UL
WBC # BLD AUTO: 2.49 K/UL
WBC # BLD AUTO: 2.55 K/UL
WBC # BLD AUTO: 2.75 K/UL
WBC # BLD AUTO: 22.29 K/UL
WBC # BLD AUTO: 3.09 K/UL
WBC # BLD AUTO: 3.23 K/UL
WBC # BLD AUTO: 3.35 K/UL
WBC # BLD AUTO: 4.05 K/UL
WBC #/AREA URNS HPF: 1 /HPF (ref 0–5)

## 2018-01-01 PROCEDURE — 25000003 PHARM REV CODE 250: Performed by: HOSPITALIST

## 2018-01-01 PROCEDURE — 80053 COMPREHEN METABOLIC PANEL: CPT

## 2018-01-01 PROCEDURE — 85610 PROTHROMBIN TIME: CPT

## 2018-01-01 PROCEDURE — 82533 TOTAL CORTISOL: CPT

## 2018-01-01 PROCEDURE — 84100 ASSAY OF PHOSPHORUS: CPT

## 2018-01-01 PROCEDURE — P9021 RED BLOOD CELLS UNIT: HCPCS

## 2018-01-01 PROCEDURE — 83735 ASSAY OF MAGNESIUM: CPT

## 2018-01-01 PROCEDURE — 20000000 HC ICU ROOM

## 2018-01-01 PROCEDURE — 99900026 HC AIRWAY MAINTENANCE (STAT)

## 2018-01-01 PROCEDURE — 25000242 PHARM REV CODE 250 ALT 637 W/ HCPCS: Performed by: EMERGENCY MEDICINE

## 2018-01-01 PROCEDURE — 25000003 PHARM REV CODE 250: Performed by: INTERNAL MEDICINE

## 2018-01-01 PROCEDURE — 36430 TRANSFUSION BLD/BLD COMPNT: CPT

## 2018-01-01 PROCEDURE — 94003 VENT MGMT INPAT SUBQ DAY: CPT

## 2018-01-01 PROCEDURE — 36600 WITHDRAWAL OF ARTERIAL BLOOD: CPT

## 2018-01-01 PROCEDURE — 99291 CRITICAL CARE FIRST HOUR: CPT | Mod: ,,, | Performed by: INTERNAL MEDICINE

## 2018-01-01 PROCEDURE — 82803 BLOOD GASES ANY COMBINATION: CPT

## 2018-01-01 PROCEDURE — 80074 ACUTE HEPATITIS PANEL: CPT

## 2018-01-01 PROCEDURE — 94640 AIRWAY INHALATION TREATMENT: CPT

## 2018-01-01 PROCEDURE — 63600175 PHARM REV CODE 636 W HCPCS: Performed by: INTERNAL MEDICINE

## 2018-01-01 PROCEDURE — 25000003 PHARM REV CODE 250

## 2018-01-01 PROCEDURE — C9113 INJ PANTOPRAZOLE SODIUM, VIA: HCPCS | Performed by: INTERNAL MEDICINE

## 2018-01-01 PROCEDURE — B543ZZA ULTRASONOGRAPHY OF RIGHT JUGULAR VEINS, GUIDANCE: ICD-10-PCS | Performed by: INTERNAL MEDICINE

## 2018-01-01 PROCEDURE — 94761 N-INVAS EAR/PLS OXIMETRY MLT: CPT

## 2018-01-01 PROCEDURE — P9035 PLATELET PHERES LEUKOREDUCED: HCPCS

## 2018-01-01 PROCEDURE — 99900035 HC TECH TIME PER 15 MIN (STAT)

## 2018-01-01 PROCEDURE — 93010 ELECTROCARDIOGRAM REPORT: CPT | Mod: ,,, | Performed by: INTERNAL MEDICINE

## 2018-01-01 PROCEDURE — 36415 COLL VENOUS BLD VENIPUNCTURE: CPT

## 2018-01-01 PROCEDURE — 86580 TB INTRADERMAL TEST: CPT | Performed by: INTERNAL MEDICINE

## 2018-01-01 PROCEDURE — 86850 RBC ANTIBODY SCREEN: CPT

## 2018-01-01 PROCEDURE — 87205 SMEAR GRAM STAIN: CPT

## 2018-01-01 PROCEDURE — 93005 ELECTROCARDIOGRAM TRACING: CPT

## 2018-01-01 PROCEDURE — 63600175 PHARM REV CODE 636 W HCPCS: Performed by: HOSPITALIST

## 2018-01-01 PROCEDURE — 87040 BLOOD CULTURE FOR BACTERIA: CPT

## 2018-01-01 PROCEDURE — 80202 ASSAY OF VANCOMYCIN: CPT

## 2018-01-01 PROCEDURE — 5A1955Z RESPIRATORY VENTILATION, GREATER THAN 96 CONSECUTIVE HOURS: ICD-10-PCS | Performed by: HOSPITALIST

## 2018-01-01 PROCEDURE — 25500020 PHARM REV CODE 255: Performed by: HOSPITALIST

## 2018-01-01 PROCEDURE — 85025 COMPLETE CBC W/AUTO DIFF WBC: CPT

## 2018-01-01 PROCEDURE — 93306 TTE W/DOPPLER COMPLETE: CPT | Mod: 26,,, | Performed by: INTERNAL MEDICINE

## 2018-01-01 PROCEDURE — 85007 BL SMEAR W/DIFF WBC COUNT: CPT

## 2018-01-01 PROCEDURE — 81000 URINALYSIS NONAUTO W/SCOPE: CPT

## 2018-01-01 PROCEDURE — 99291 CRITICAL CARE FIRST HOUR: CPT | Mod: 25,,, | Performed by: INTERNAL MEDICINE

## 2018-01-01 PROCEDURE — 96368 THER/DIAG CONCURRENT INF: CPT

## 2018-01-01 PROCEDURE — 87116 MYCOBACTERIA CULTURE: CPT

## 2018-01-01 PROCEDURE — 82746 ASSAY OF FOLIC ACID SERUM: CPT

## 2018-01-01 PROCEDURE — 80202 ASSAY OF VANCOMYCIN: CPT | Mod: 91

## 2018-01-01 PROCEDURE — 27000221 HC OXYGEN, UP TO 24 HOURS

## 2018-01-01 PROCEDURE — 87070 CULTURE OTHR SPECIMN AEROBIC: CPT

## 2018-01-01 PROCEDURE — 85027 COMPLETE CBC AUTOMATED: CPT

## 2018-01-01 PROCEDURE — 25000242 PHARM REV CODE 250 ALT 637 W/ HCPCS: Performed by: INTERNAL MEDICINE

## 2018-01-01 PROCEDURE — 82140 ASSAY OF AMMONIA: CPT

## 2018-01-01 PROCEDURE — 83880 ASSAY OF NATRIURETIC PEPTIDE: CPT

## 2018-01-01 PROCEDURE — 27000190 HC CPAP FULL FACE MASK W/VALVE

## 2018-01-01 PROCEDURE — 94660 CPAP INITIATION&MGMT: CPT

## 2018-01-01 PROCEDURE — 63600175 PHARM REV CODE 636 W HCPCS: Performed by: EMERGENCY MEDICINE

## 2018-01-01 PROCEDURE — 82607 VITAMIN B-12: CPT

## 2018-01-01 PROCEDURE — 80069 RENAL FUNCTION PANEL: CPT

## 2018-01-01 PROCEDURE — 93306 TTE W/DOPPLER COMPLETE: CPT

## 2018-01-01 PROCEDURE — 86920 COMPATIBILITY TEST SPIN: CPT

## 2018-01-01 PROCEDURE — 63600175 PHARM REV CODE 636 W HCPCS: Mod: JG | Performed by: INTERNAL MEDICINE

## 2018-01-01 PROCEDURE — 84300 ASSAY OF URINE SODIUM: CPT

## 2018-01-01 PROCEDURE — 87015 SPECIMEN INFECT AGNT CONCNTJ: CPT

## 2018-01-01 PROCEDURE — 83540 ASSAY OF IRON: CPT

## 2018-01-01 PROCEDURE — 36620 INSERTION CATHETER ARTERY: CPT | Mod: ,,, | Performed by: INTERNAL MEDICINE

## 2018-01-01 PROCEDURE — 25000003 PHARM REV CODE 250: Performed by: STUDENT IN AN ORGANIZED HEALTH CARE EDUCATION/TRAINING PROGRAM

## 2018-01-01 PROCEDURE — 0BH17EZ INSERTION OF ENDOTRACHEAL AIRWAY INTO TRACHEA, VIA NATURAL OR ARTIFICIAL OPENING: ICD-10-PCS | Performed by: HOSPITALIST

## 2018-01-01 PROCEDURE — 96361 HYDRATE IV INFUSION ADD-ON: CPT

## 2018-01-01 PROCEDURE — 87118 MYCOBACTERIC IDENTIFICATION: CPT

## 2018-01-01 PROCEDURE — 87449 NOS EACH ORGANISM AG IA: CPT

## 2018-01-01 PROCEDURE — 83605 ASSAY OF LACTIC ACID: CPT

## 2018-01-01 PROCEDURE — 87086 URINE CULTURE/COLONY COUNT: CPT

## 2018-01-01 PROCEDURE — 83615 LACTATE (LD) (LDH) ENZYME: CPT

## 2018-01-01 PROCEDURE — 21400001 HC TELEMETRY ROOM

## 2018-01-01 PROCEDURE — 96365 THER/PROPH/DIAG IV INF INIT: CPT

## 2018-01-01 PROCEDURE — 87522 HEPATITIS C REVRS TRNSCRPJ: CPT

## 2018-01-01 PROCEDURE — 02HV33Z INSERTION OF INFUSION DEVICE INTO SUPERIOR VENA CAVA, PERCUTANEOUS APPROACH: ICD-10-PCS | Performed by: INTERNAL MEDICINE

## 2018-01-01 PROCEDURE — 85045 AUTOMATED RETICULOCYTE COUNT: CPT

## 2018-01-01 PROCEDURE — S0028 INJECTION, FAMOTIDINE, 20 MG: HCPCS | Performed by: HOSPITALIST

## 2018-01-01 PROCEDURE — 80048 BASIC METABOLIC PNL TOTAL CA: CPT

## 2018-01-01 PROCEDURE — 87149 DNA/RNA DIRECT PROBE: CPT

## 2018-01-01 PROCEDURE — 84550 ASSAY OF BLOOD/URIC ACID: CPT

## 2018-01-01 PROCEDURE — 05HM33Z INSERTION OF INFUSION DEVICE INTO RIGHT INTERNAL JUGULAR VEIN, PERCUTANEOUS APPROACH: ICD-10-PCS | Performed by: INTERNAL MEDICINE

## 2018-01-01 PROCEDURE — 99292 CRITICAL CARE ADDL 30 MIN: CPT | Mod: 25,,, | Performed by: INTERNAL MEDICINE

## 2018-01-01 PROCEDURE — 27200966 HC CLOSED SUCTION SYSTEM

## 2018-01-01 PROCEDURE — 87186 SC STD MICRODIL/AGAR DIL: CPT

## 2018-01-01 PROCEDURE — 99285 EMERGENCY DEPT VISIT HI MDM: CPT | Mod: 25

## 2018-01-01 PROCEDURE — 25000003 PHARM REV CODE 250: Performed by: EMERGENCY MEDICINE

## 2018-01-01 PROCEDURE — 86480 TB TEST CELL IMMUN MEASURE: CPT

## 2018-01-01 PROCEDURE — 86703 HIV-1/HIV-2 1 RESULT ANTBDY: CPT

## 2018-01-01 PROCEDURE — 82728 ASSAY OF FERRITIN: CPT

## 2018-01-01 PROCEDURE — 94760 N-INVAS EAR/PLS OXIMETRY 1: CPT

## 2018-01-01 PROCEDURE — 87071 CULTURE AEROBIC QUANT OTHER: CPT

## 2018-01-01 PROCEDURE — 87206 SMEAR FLUORESCENT/ACID STAI: CPT

## 2018-01-01 PROCEDURE — 36556 INSERT NON-TUNNEL CV CATH: CPT | Mod: ,,, | Performed by: INTERNAL MEDICINE

## 2018-01-01 PROCEDURE — 31500 INSERT EMERGENCY AIRWAY: CPT | Mod: ,,, | Performed by: ANESTHESIOLOGY

## 2018-01-01 PROCEDURE — 94002 VENT MGMT INPAT INIT DAY: CPT

## 2018-01-01 PROCEDURE — 82570 ASSAY OF URINE CREATININE: CPT

## 2018-01-01 PROCEDURE — 63600175 PHARM REV CODE 636 W HCPCS: Performed by: ANESTHESIOLOGY

## 2018-01-01 PROCEDURE — 87400 INFLUENZA A/B EACH AG IA: CPT | Mod: 59

## 2018-01-01 PROCEDURE — 87040 BLOOD CULTURE FOR BACTERIA: CPT | Mod: 59

## 2018-01-01 RX ORDER — SUCCINYLCHOLINE CHLORIDE 20 MG/ML
INJECTION INTRAMUSCULAR; INTRAVENOUS
Status: DISCONTINUED | OUTPATIENT
Start: 2018-01-01 | End: 2018-01-01 | Stop reason: HOSPADM

## 2018-01-01 RX ORDER — CEFEPIME HYDROCHLORIDE 1 G/50ML
1 INJECTION, SOLUTION INTRAVENOUS
Status: DISCONTINUED | OUTPATIENT
Start: 2018-01-01 | End: 2018-01-01

## 2018-01-01 RX ORDER — SODIUM CHLORIDE 9 MG/ML
INJECTION, SOLUTION INTRAVENOUS CONTINUOUS
Status: DISCONTINUED | OUTPATIENT
Start: 2018-01-01 | End: 2018-01-01

## 2018-01-01 RX ORDER — FUROSEMIDE 10 MG/ML
80 INJECTION INTRAMUSCULAR; INTRAVENOUS 2 TIMES DAILY
Status: DISCONTINUED | OUTPATIENT
Start: 2018-01-01 | End: 2018-01-01

## 2018-01-01 RX ORDER — FUROSEMIDE 10 MG/ML
20 INJECTION INTRAMUSCULAR; INTRAVENOUS ONCE
Status: COMPLETED | OUTPATIENT
Start: 2018-01-01 | End: 2018-01-01

## 2018-01-01 RX ORDER — FAMOTIDINE 10 MG/ML
20 INJECTION INTRAVENOUS 2 TIMES DAILY
Status: DISCONTINUED | OUTPATIENT
Start: 2018-01-01 | End: 2018-01-01

## 2018-01-01 RX ORDER — FENTANYL CITRATE 50 UG/ML
50 INJECTION, SOLUTION INTRAMUSCULAR; INTRAVENOUS
Status: DISCONTINUED | OUTPATIENT
Start: 2018-01-01 | End: 2018-01-01 | Stop reason: HOSPADM

## 2018-01-01 RX ORDER — LORAZEPAM 2 MG/ML
2 INJECTION INTRAMUSCULAR ONCE
Status: COMPLETED | OUTPATIENT
Start: 2018-01-01 | End: 2018-01-01

## 2018-01-01 RX ORDER — METHYLPREDNISOLONE SOD SUCC 125 MG
80 VIAL (EA) INJECTION EVERY 8 HOURS
Status: DISCONTINUED | OUTPATIENT
Start: 2018-01-01 | End: 2018-01-01

## 2018-01-01 RX ORDER — SODIUM CHLORIDE 9 MG/ML
INJECTION, SOLUTION INTRAVENOUS CONTINUOUS
Status: ACTIVE | OUTPATIENT
Start: 2018-01-01 | End: 2018-01-01

## 2018-01-01 RX ORDER — LORAZEPAM 2 MG/ML
1 INJECTION INTRAMUSCULAR EVERY 30 MIN PRN
Status: DISCONTINUED | OUTPATIENT
Start: 2018-01-01 | End: 2018-01-01 | Stop reason: HOSPADM

## 2018-01-01 RX ORDER — PROPOFOL 10 MG/ML
VIAL (ML) INTRAVENOUS
Status: DISCONTINUED | OUTPATIENT
Start: 2018-01-01 | End: 2018-01-01 | Stop reason: HOSPADM

## 2018-01-01 RX ORDER — METHYLPREDNISOLONE SOD SUCC 125 MG
80 VIAL (EA) INJECTION EVERY 6 HOURS
Status: DISCONTINUED | OUTPATIENT
Start: 2018-01-01 | End: 2018-01-01

## 2018-01-01 RX ORDER — CEFEPIME HYDROCHLORIDE 1 G/1
1 INJECTION, POWDER, FOR SOLUTION INTRAMUSCULAR; INTRAVENOUS
Status: DISCONTINUED | OUTPATIENT
Start: 2018-01-01 | End: 2018-01-01

## 2018-01-01 RX ORDER — FOLIC ACID 1 MG/1
1 TABLET ORAL DAILY
Status: DISCONTINUED | OUTPATIENT
Start: 2018-01-01 | End: 2018-01-01

## 2018-01-01 RX ORDER — NOREPINEPHRINE BITARTRATE/D5W 4MG/250ML
0.02 PLASTIC BAG, INJECTION (ML) INTRAVENOUS CONTINUOUS
Status: DISCONTINUED | OUTPATIENT
Start: 2018-01-01 | End: 2018-01-01

## 2018-01-01 RX ORDER — FUROSEMIDE 10 MG/ML
40 INJECTION INTRAMUSCULAR; INTRAVENOUS ONCE
Status: COMPLETED | OUTPATIENT
Start: 2018-01-01 | End: 2018-01-01

## 2018-01-01 RX ORDER — FLUCONAZOLE 2 MG/ML
800 INJECTION, SOLUTION INTRAVENOUS ONCE
Status: COMPLETED | OUTPATIENT
Start: 2018-01-01 | End: 2018-01-01

## 2018-01-01 RX ORDER — METHYLPREDNISOLONE SOD SUCC 125 MG
125 VIAL (EA) INJECTION EVERY 6 HOURS
Status: DISCONTINUED | OUTPATIENT
Start: 2018-01-01 | End: 2018-01-01

## 2018-01-01 RX ORDER — POTASSIUM CHLORIDE 20 MEQ/1
40 TABLET, EXTENDED RELEASE ORAL EVERY 4 HOURS
Status: DISCONTINUED | OUTPATIENT
Start: 2018-01-01 | End: 2018-01-01

## 2018-01-01 RX ORDER — AMOXICILLIN 250 MG
1 CAPSULE ORAL 2 TIMES DAILY PRN
Status: DISCONTINUED | OUTPATIENT
Start: 2018-01-01 | End: 2018-01-01

## 2018-01-01 RX ORDER — POTASSIUM CHLORIDE 20 MEQ/1
40 TABLET, EXTENDED RELEASE ORAL EVERY 4 HOURS
Status: COMPLETED | OUTPATIENT
Start: 2018-01-01 | End: 2018-01-01

## 2018-01-01 RX ORDER — NOREPINEPHRINE BITARTRATE/D5W 4MG/250ML
PLASTIC BAG, INJECTION (ML) INTRAVENOUS
Status: COMPLETED
Start: 2018-01-01 | End: 2018-01-01

## 2018-01-01 RX ORDER — FENTANYL CITRATE 50 UG/ML
50 INJECTION, SOLUTION INTRAMUSCULAR; INTRAVENOUS
Status: DISCONTINUED | OUTPATIENT
Start: 2018-01-01 | End: 2018-01-01

## 2018-01-01 RX ORDER — IPRATROPIUM BROMIDE AND ALBUTEROL SULFATE 2.5; .5 MG/3ML; MG/3ML
3 SOLUTION RESPIRATORY (INHALATION) EVERY 4 HOURS PRN
Status: DISCONTINUED | OUTPATIENT
Start: 2018-01-01 | End: 2018-01-01

## 2018-01-01 RX ORDER — GLYCOPYRROLATE 0.2 MG/ML
0.4 INJECTION INTRAMUSCULAR; INTRAVENOUS EVERY 8 HOURS PRN
Status: DISCONTINUED | OUTPATIENT
Start: 2018-01-01 | End: 2018-01-01 | Stop reason: HOSPADM

## 2018-01-01 RX ORDER — FENTANYL CITRATE 50 UG/ML
50 INJECTION, SOLUTION INTRAMUSCULAR; INTRAVENOUS
Status: ACTIVE | OUTPATIENT
Start: 2018-01-01 | End: 2018-01-01

## 2018-01-01 RX ORDER — IPRATROPIUM BROMIDE AND ALBUTEROL SULFATE 2.5; .5 MG/3ML; MG/3ML
3 SOLUTION RESPIRATORY (INHALATION)
Status: COMPLETED | OUTPATIENT
Start: 2018-01-01 | End: 2018-01-01

## 2018-01-01 RX ORDER — HYDROCODONE BITARTRATE AND ACETAMINOPHEN 500; 5 MG/1; MG/1
TABLET ORAL
Status: DISCONTINUED | OUTPATIENT
Start: 2018-01-01 | End: 2018-01-01

## 2018-01-01 RX ORDER — ACETAMINOPHEN 650 MG/1
650 SUPPOSITORY RECTAL EVERY 6 HOURS PRN
Status: DISCONTINUED | OUTPATIENT
Start: 2018-01-01 | End: 2018-01-01

## 2018-01-01 RX ORDER — SCOLOPAMINE TRANSDERMAL SYSTEM 1 MG/1
1 PATCH, EXTENDED RELEASE TRANSDERMAL
Status: DISCONTINUED | OUTPATIENT
Start: 2018-01-01 | End: 2018-01-01 | Stop reason: HOSPADM

## 2018-01-01 RX ORDER — FUROSEMIDE 10 MG/ML
20 INJECTION INTRAMUSCULAR; INTRAVENOUS ONCE
Status: DISCONTINUED | OUTPATIENT
Start: 2018-01-01 | End: 2018-01-01

## 2018-01-01 RX ORDER — FUROSEMIDE 10 MG/ML
80 INJECTION INTRAMUSCULAR; INTRAVENOUS ONCE
Status: COMPLETED | OUTPATIENT
Start: 2018-01-01 | End: 2018-01-01

## 2018-01-01 RX ORDER — PROPOFOL 10 MG/ML
5 INJECTION, EMULSION INTRAVENOUS CONTINUOUS
Status: DISCONTINUED | OUTPATIENT
Start: 2018-01-01 | End: 2018-01-01 | Stop reason: HOSPADM

## 2018-01-01 RX ORDER — FLUCONAZOLE 2 MG/ML
400 INJECTION, SOLUTION INTRAVENOUS
Status: DISCONTINUED | OUTPATIENT
Start: 2018-01-01 | End: 2018-01-01

## 2018-01-01 RX ORDER — LANOLIN ALCOHOL/MO/W.PET/CERES
1000 CREAM (GRAM) TOPICAL DAILY
Status: DISCONTINUED | OUTPATIENT
Start: 2018-01-01 | End: 2018-01-01

## 2018-01-01 RX ORDER — CLONIDINE HYDROCHLORIDE 0.1 MG/1
0.1 TABLET ORAL 3 TIMES DAILY PRN
Status: DISCONTINUED | OUTPATIENT
Start: 2018-01-01 | End: 2018-01-01

## 2018-01-01 RX ORDER — ONDANSETRON 2 MG/ML
8 INJECTION INTRAMUSCULAR; INTRAVENOUS EVERY 8 HOURS PRN
Status: DISCONTINUED | OUTPATIENT
Start: 2018-01-01 | End: 2018-01-01 | Stop reason: HOSPADM

## 2018-01-01 RX ORDER — CHLORHEXIDINE GLUCONATE ORAL RINSE 1.2 MG/ML
15 SOLUTION DENTAL 2 TIMES DAILY
Status: DISCONTINUED | OUTPATIENT
Start: 2018-01-01 | End: 2018-01-01

## 2018-01-01 RX ORDER — MEROPENEM 1 G/1
1 INJECTION, POWDER, FOR SOLUTION INTRAVENOUS
Status: DISCONTINUED | OUTPATIENT
Start: 2018-01-01 | End: 2018-01-01

## 2018-01-01 RX ORDER — ACETAMINOPHEN 500 MG
1000 TABLET ORAL
Status: DISCONTINUED | OUTPATIENT
Start: 2018-01-01 | End: 2018-01-01

## 2018-01-01 RX ORDER — AMPICILLIN AND SULBACTAM 2; 1 G/1; G/1
3 INJECTION, POWDER, FOR SOLUTION INTRAMUSCULAR; INTRAVENOUS
Status: DISCONTINUED | OUTPATIENT
Start: 2018-01-01 | End: 2018-01-01 | Stop reason: CLARIF

## 2018-01-01 RX ORDER — POTASSIUM CHLORIDE 20 MEQ/1
60 TABLET, EXTENDED RELEASE ORAL
Status: COMPLETED | OUTPATIENT
Start: 2018-01-01 | End: 2018-01-01

## 2018-01-01 RX ORDER — FLUCONAZOLE 2 MG/ML
200 INJECTION, SOLUTION INTRAVENOUS
Status: DISCONTINUED | OUTPATIENT
Start: 2018-01-01 | End: 2018-01-01

## 2018-01-01 RX ORDER — HYDROCODONE BITARTRATE AND ACETAMINOPHEN 5; 325 MG/1; MG/1
1 TABLET ORAL EVERY 4 HOURS PRN
Status: CANCELLED | OUTPATIENT
Start: 2018-01-01

## 2018-01-01 RX ORDER — POLYETHYLENE GLYCOL 3350 17 G/17G
17 POWDER, FOR SOLUTION ORAL DAILY
Status: CANCELLED | OUTPATIENT
Start: 2018-01-01

## 2018-01-01 RX ORDER — CEFTRIAXONE 2 G/50ML
2 INJECTION, SOLUTION INTRAVENOUS
Status: COMPLETED | OUTPATIENT
Start: 2018-01-01 | End: 2018-01-01

## 2018-01-01 RX ORDER — IBUPROFEN 400 MG/1
400 TABLET ORAL EVERY 8 HOURS PRN
Status: DISCONTINUED | OUTPATIENT
Start: 2018-01-01 | End: 2018-01-01

## 2018-01-01 RX ORDER — ACETAMINOPHEN 650 MG/20.3ML
650 LIQUID ORAL EVERY 6 HOURS PRN
Status: DISCONTINUED | OUTPATIENT
Start: 2018-01-01 | End: 2018-01-01

## 2018-01-01 RX ORDER — PANTOPRAZOLE SODIUM 40 MG/10ML
40 INJECTION, POWDER, LYOPHILIZED, FOR SOLUTION INTRAVENOUS 2 TIMES DAILY
Status: DISCONTINUED | OUTPATIENT
Start: 2018-01-01 | End: 2018-01-01

## 2018-01-01 RX ORDER — PANTOPRAZOLE SODIUM 40 MG/10ML
40 INJECTION, POWDER, LYOPHILIZED, FOR SOLUTION INTRAVENOUS DAILY
Status: DISCONTINUED | OUTPATIENT
Start: 2018-01-01 | End: 2018-01-01

## 2018-01-01 RX ORDER — FUROSEMIDE 40 MG/1
40 TABLET ORAL DAILY
Status: DISCONTINUED | OUTPATIENT
Start: 2018-01-01 | End: 2018-01-01

## 2018-01-01 RX ORDER — ENOXAPARIN SODIUM 100 MG/ML
40 INJECTION SUBCUTANEOUS EVERY 24 HOURS
Status: DISCONTINUED | OUTPATIENT
Start: 2018-01-01 | End: 2018-01-01

## 2018-01-01 RX ORDER — IPRATROPIUM BROMIDE AND ALBUTEROL SULFATE 2.5; .5 MG/3ML; MG/3ML
3 SOLUTION RESPIRATORY (INHALATION) EVERY 6 HOURS
Status: DISCONTINUED | OUTPATIENT
Start: 2018-01-01 | End: 2018-01-01

## 2018-01-01 RX ORDER — FENTANYL CITRATE 50 UG/ML
25 INJECTION, SOLUTION INTRAMUSCULAR; INTRAVENOUS ONCE
Status: COMPLETED | OUTPATIENT
Start: 2018-01-01 | End: 2018-01-01

## 2018-01-01 RX ORDER — ACETAMINOPHEN 500 MG
500 TABLET ORAL EVERY 6 HOURS PRN
Status: DISCONTINUED | OUTPATIENT
Start: 2018-01-01 | End: 2018-01-01

## 2018-01-01 RX ORDER — RAMELTEON 8 MG/1
8 TABLET ORAL NIGHTLY PRN
Status: DISCONTINUED | OUTPATIENT
Start: 2018-01-01 | End: 2018-01-01

## 2018-01-01 RX ORDER — ACETAMINOPHEN 10 MG/ML
1000 INJECTION, SOLUTION INTRAVENOUS ONCE
Status: COMPLETED | OUTPATIENT
Start: 2018-01-01 | End: 2018-01-01

## 2018-01-01 RX ORDER — CEFTRIAXONE 2 G/50ML
2 INJECTION, SOLUTION INTRAVENOUS
Status: DISCONTINUED | OUTPATIENT
Start: 2018-01-01 | End: 2018-01-01

## 2018-01-01 RX ADMIN — ACETAMINOPHEN 650 MG: 650 SOLUTION ORAL at 06:02

## 2018-01-01 RX ADMIN — SUCCINYLCHOLINE CHLORIDE 160 MG: 20 INJECTION, SOLUTION INTRAMUSCULAR; INTRAVENOUS at 09:02

## 2018-01-01 RX ADMIN — FOLIC ACID 1 MG: 1 TABLET ORAL at 08:02

## 2018-01-01 RX ADMIN — MEROPENEM 1 G: 1 INJECTION, POWDER, FOR SOLUTION INTRAVENOUS at 11:02

## 2018-01-01 RX ADMIN — AMPICILLIN SODIUM AND SULBACTAM SODIUM 3 G: 2; 1 INJECTION, POWDER, FOR SOLUTION INTRAMUSCULAR; INTRAVENOUS at 12:02

## 2018-01-01 RX ADMIN — POTASSIUM CHLORIDE 40 MEQ: 1500 TABLET, EXTENDED RELEASE ORAL at 11:02

## 2018-01-01 RX ADMIN — LORAZEPAM 2 MG: 2 INJECTION INTRAMUSCULAR; INTRAVENOUS at 01:02

## 2018-01-01 RX ADMIN — IPRATROPIUM BROMIDE AND ALBUTEROL SULFATE 3 ML: .5; 3 SOLUTION RESPIRATORY (INHALATION) at 12:02

## 2018-01-01 RX ADMIN — MEROPENEM 1 G: 1 INJECTION, POWDER, FOR SOLUTION INTRAVENOUS at 04:02

## 2018-01-01 RX ADMIN — CHLORHEXIDINE GLUCONATE 15 ML: 1.2 RINSE ORAL at 08:02

## 2018-01-01 RX ADMIN — POTASSIUM CHLORIDE 40 MEQ: 1500 TABLET, EXTENDED RELEASE ORAL at 09:02

## 2018-01-01 RX ADMIN — PROPOFOL 50 MCG/KG/MIN: 10 INJECTION, EMULSION INTRAVENOUS at 03:02

## 2018-01-01 RX ADMIN — PROPOFOL 35 MCG/KG/MIN: 10 INJECTION, EMULSION INTRAVENOUS at 09:02

## 2018-01-01 RX ADMIN — CEFEPIME 1 G: 1 INJECTION, POWDER, FOR SOLUTION INTRAMUSCULAR; INTRAVENOUS at 12:02

## 2018-01-01 RX ADMIN — NOREPINEPHRINE BITARTRATE 0.25 MCG/KG/MIN: 1 INJECTION INTRAVENOUS at 12:02

## 2018-01-01 RX ADMIN — FENTANYL CITRATE 50 MCG: 50 INJECTION, SOLUTION INTRAMUSCULAR; INTRAVENOUS at 07:02

## 2018-01-01 RX ADMIN — ACETAMINOPHEN 1000 MG: 500 TABLET ORAL at 03:02

## 2018-01-01 RX ADMIN — METHYLPREDNISOLONE SODIUM SUCCINATE 80 MG: 40 INJECTION, POWDER, FOR SOLUTION INTRAMUSCULAR; INTRAVENOUS at 11:02

## 2018-01-01 RX ADMIN — CEFEPIME 1 G: 1 INJECTION, POWDER, FOR SOLUTION INTRAMUSCULAR; INTRAVENOUS at 03:02

## 2018-01-01 RX ADMIN — VANCOMYCIN HYDROCHLORIDE 1500 MG: 1 INJECTION, POWDER, LYOPHILIZED, FOR SOLUTION INTRAVENOUS at 09:02

## 2018-01-01 RX ADMIN — VANCOMYCIN HYDROCHLORIDE 1000 MG: 1 INJECTION, POWDER, LYOPHILIZED, FOR SOLUTION INTRAVENOUS at 01:02

## 2018-01-01 RX ADMIN — SODIUM CHLORIDE: 234 INJECTION INTRAMUSCULAR; INTRAVENOUS; SUBCUTANEOUS at 04:02

## 2018-01-01 RX ADMIN — IPRATROPIUM BROMIDE AND ALBUTEROL SULFATE 3 ML: .5; 3 SOLUTION RESPIRATORY (INHALATION) at 07:02

## 2018-01-01 RX ADMIN — SODIUM CHLORIDE 1000 ML: 0.9 INJECTION, SOLUTION INTRAVENOUS at 03:02

## 2018-01-01 RX ADMIN — IPRATROPIUM BROMIDE AND ALBUTEROL SULFATE 3 ML: .5; 3 SOLUTION RESPIRATORY (INHALATION) at 01:02

## 2018-01-01 RX ADMIN — FAMOTIDINE 20 MG: 10 INJECTION INTRAVENOUS at 09:02

## 2018-01-01 RX ADMIN — ACETAMINOPHEN 500 MG: 500 TABLET ORAL at 10:02

## 2018-01-01 RX ADMIN — PROPOFOL 50 MCG/KG/MIN: 10 INJECTION, EMULSION INTRAVENOUS at 09:02

## 2018-01-01 RX ADMIN — PANTOPRAZOLE SODIUM 40 MG: 40 INJECTION, POWDER, FOR SOLUTION INTRAVENOUS at 09:02

## 2018-01-01 RX ADMIN — PROPOFOL 30 MCG/KG/MIN: 10 INJECTION, EMULSION INTRAVENOUS at 06:02

## 2018-01-01 RX ADMIN — AMPICILLIN SODIUM AND SULBACTAM SODIUM 3 G: 2; 1 INJECTION, POWDER, FOR SOLUTION INTRAMUSCULAR; INTRAVENOUS at 08:02

## 2018-01-01 RX ADMIN — CHLORHEXIDINE GLUCONATE 15 ML: 1.2 RINSE ORAL at 09:02

## 2018-01-01 RX ADMIN — PROPOFOL 40 MCG/KG/MIN: 10 INJECTION, EMULSION INTRAVENOUS at 04:02

## 2018-01-01 RX ADMIN — PROPOFOL 35 MCG/KG/MIN: 10 INJECTION, EMULSION INTRAVENOUS at 04:02

## 2018-01-01 RX ADMIN — PROPOFOL 30 MCG/KG/MIN: 10 INJECTION, EMULSION INTRAVENOUS at 08:02

## 2018-01-01 RX ADMIN — MEROPENEM 1 G: 1 INJECTION, POWDER, FOR SOLUTION INTRAVENOUS at 06:02

## 2018-01-01 RX ADMIN — METHYLPREDNISOLONE SODIUM SUCCINATE 125 MG: 125 INJECTION, POWDER, FOR SOLUTION INTRAMUSCULAR; INTRAVENOUS at 11:02

## 2018-01-01 RX ADMIN — AMPICILLIN SODIUM AND SULBACTAM SODIUM 3 G: 2; 1 INJECTION, POWDER, FOR SOLUTION INTRAMUSCULAR; INTRAVENOUS at 02:02

## 2018-01-01 RX ADMIN — PANTOPRAZOLE SODIUM 40 MG: 40 INJECTION, POWDER, FOR SOLUTION INTRAVENOUS at 08:02

## 2018-01-01 RX ADMIN — METHYLPREDNISOLONE SODIUM SUCCINATE 125 MG: 125 INJECTION, POWDER, FOR SOLUTION INTRAMUSCULAR; INTRAVENOUS at 05:02

## 2018-01-01 RX ADMIN — FUROSEMIDE 40 MG: 40 TABLET ORAL at 08:02

## 2018-01-01 RX ADMIN — PROPOFOL 30 MCG/KG/MIN: 10 INJECTION, EMULSION INTRAVENOUS at 03:02

## 2018-01-01 RX ADMIN — CEFEPIME 1 G: 1 INJECTION, POWDER, FOR SOLUTION INTRAMUSCULAR; INTRAVENOUS at 04:02

## 2018-01-01 RX ADMIN — ACETAMINOPHEN 500 MG: 500 TABLET ORAL at 09:02

## 2018-01-01 RX ADMIN — SCOLOPAMINE TRANSDERMAL SYSTEM 1 PATCH: 1 PATCH, EXTENDED RELEASE TRANSDERMAL at 10:02

## 2018-01-01 RX ADMIN — CEFEPIME 1 G: 1 INJECTION, POWDER, FOR SOLUTION INTRAMUSCULAR; INTRAVENOUS at 01:02

## 2018-01-01 RX ADMIN — AMPICILLIN SODIUM AND SULBACTAM SODIUM 3 G: 2; 1 INJECTION, POWDER, FOR SOLUTION INTRAMUSCULAR; INTRAVENOUS at 01:02

## 2018-01-01 RX ADMIN — VANCOMYCIN HYDROCHLORIDE 1000 MG: 1 INJECTION, POWDER, LYOPHILIZED, FOR SOLUTION INTRAVENOUS at 05:02

## 2018-01-01 RX ADMIN — DEXTROSE 8 MG/HR: 50 INJECTION, SOLUTION INTRAVENOUS at 02:02

## 2018-01-01 RX ADMIN — FENTANYL CITRATE 200 MCG/HR: 50 INJECTION, SOLUTION INTRAMUSCULAR; INTRAVENOUS at 06:02

## 2018-01-01 RX ADMIN — PROPOFOL 25 MCG/KG/MIN: 10 INJECTION, EMULSION INTRAVENOUS at 03:02

## 2018-01-01 RX ADMIN — POTASSIUM CHLORIDE 40 MEQ: 1500 TABLET, EXTENDED RELEASE ORAL at 05:02

## 2018-01-01 RX ADMIN — PROPOFOL 50 MCG/KG/MIN: 10 INJECTION, EMULSION INTRAVENOUS at 07:02

## 2018-01-01 RX ADMIN — PROPOFOL 35 MCG/KG/MIN: 10 INJECTION, EMULSION INTRAVENOUS at 03:02

## 2018-01-01 RX ADMIN — CISATRACURIUM BESYLATE 2 MCG/KG/MIN: 10 INJECTION INTRAVENOUS at 10:02

## 2018-01-01 RX ADMIN — MEROPENEM 1 G: 1 INJECTION, POWDER, FOR SOLUTION INTRAVENOUS at 10:02

## 2018-01-01 RX ADMIN — POTASSIUM CHLORIDE 40 MEQ: 1500 TABLET, EXTENDED RELEASE ORAL at 02:02

## 2018-01-01 RX ADMIN — PROPOFOL 30 MCG/KG/MIN: 10 INJECTION, EMULSION INTRAVENOUS at 12:02

## 2018-01-01 RX ADMIN — PROPOFOL 40 MCG/KG/MIN: 10 INJECTION, EMULSION INTRAVENOUS at 01:02

## 2018-01-01 RX ADMIN — PROPOFOL 30 MCG/KG/MIN: 10 INJECTION, EMULSION INTRAVENOUS at 04:02

## 2018-01-01 RX ADMIN — PROPOFOL 35 MCG/KG/MIN: 10 INJECTION, EMULSION INTRAVENOUS at 12:02

## 2018-01-01 RX ADMIN — Medication 5 UNITS: at 11:02

## 2018-01-01 RX ADMIN — TBO-FILGRASTIM 300 MCG: 300 INJECTION, SOLUTION SUBCUTANEOUS at 09:02

## 2018-01-01 RX ADMIN — Medication 1000 MCG: at 08:02

## 2018-01-01 RX ADMIN — FENTANYL CITRATE 50 MCG/HR: 50 INJECTION, SOLUTION INTRAMUSCULAR; INTRAVENOUS at 10:02

## 2018-01-01 RX ADMIN — VANCOMYCIN HYDROCHLORIDE 1000 MG: 1 INJECTION, POWDER, LYOPHILIZED, FOR SOLUTION INTRAVENOUS at 06:02

## 2018-01-01 RX ADMIN — FENTANYL CITRATE 200 MCG/HR: 50 INJECTION, SOLUTION INTRAMUSCULAR; INTRAVENOUS at 05:02

## 2018-01-01 RX ADMIN — MEROPENEM 1 G: 1 INJECTION, POWDER, FOR SOLUTION INTRAVENOUS at 03:02

## 2018-01-01 RX ADMIN — PROPOFOL 20 MCG/KG/MIN: 10 INJECTION, EMULSION INTRAVENOUS at 05:02

## 2018-01-01 RX ADMIN — FENTANYL CITRATE 75 MCG/HR: 50 INJECTION, SOLUTION INTRAMUSCULAR; INTRAVENOUS at 06:02

## 2018-01-01 RX ADMIN — SODIUM CHLORIDE: 0.9 INJECTION, SOLUTION INTRAVENOUS at 02:02

## 2018-01-01 RX ADMIN — METHYLPREDNISOLONE SODIUM SUCCINATE 80 MG: 40 INJECTION, POWDER, FOR SOLUTION INTRAMUSCULAR; INTRAVENOUS at 06:02

## 2018-01-01 RX ADMIN — PROPOFOL 40 MCG/KG/MIN: 10 INJECTION, EMULSION INTRAVENOUS at 09:02

## 2018-01-01 RX ADMIN — CEFEPIME 1 G: 1 INJECTION, POWDER, FOR SOLUTION INTRAMUSCULAR; INTRAVENOUS at 11:02

## 2018-01-01 RX ADMIN — VANCOMYCIN HYDROCHLORIDE 1500 MG: 1 INJECTION, POWDER, LYOPHILIZED, FOR SOLUTION INTRAVENOUS at 10:02

## 2018-01-01 RX ADMIN — NOREPINEPHRINE-DEXTROSE IV SOLUTION 4 MG/250ML-5% 0.05 MCG/KG/MIN: 4-5/250 SOLUTION at 10:02

## 2018-01-01 RX ADMIN — ACETAMINOPHEN 650 MG: 650 SOLUTION ORAL at 12:02

## 2018-01-01 RX ADMIN — PROPOFOL 35 MCG/KG/MIN: 10 INJECTION, EMULSION INTRAVENOUS at 10:02

## 2018-01-01 RX ADMIN — PROPOFOL 15 MCG/KG/MIN: 10 INJECTION, EMULSION INTRAVENOUS at 06:02

## 2018-01-01 RX ADMIN — FOLIC ACID 1 MG: 1 TABLET ORAL at 06:02

## 2018-01-01 RX ADMIN — IPRATROPIUM BROMIDE AND ALBUTEROL SULFATE 3 ML: .5; 3 SOLUTION RESPIRATORY (INHALATION) at 08:02

## 2018-01-01 RX ADMIN — VANCOMYCIN HYDROCHLORIDE 1500 MG: 1 INJECTION, POWDER, LYOPHILIZED, FOR SOLUTION INTRAVENOUS at 03:02

## 2018-01-01 RX ADMIN — IPRATROPIUM BROMIDE AND ALBUTEROL SULFATE 3 ML: .5; 3 SOLUTION RESPIRATORY (INHALATION) at 02:02

## 2018-01-01 RX ADMIN — FUROSEMIDE 40 MG: 10 INJECTION, SOLUTION INTRAMUSCULAR; INTRAVENOUS at 10:02

## 2018-01-01 RX ADMIN — SODIUM CHLORIDE: 0.9 INJECTION, SOLUTION INTRAVENOUS at 04:02

## 2018-01-01 RX ADMIN — FUROSEMIDE 40 MG: 40 TABLET ORAL at 11:02

## 2018-01-01 RX ADMIN — CEFEPIME 1 G: 1 INJECTION, POWDER, FOR SOLUTION INTRAMUSCULAR; INTRAVENOUS at 07:02

## 2018-01-01 RX ADMIN — VANCOMYCIN HYDROCHLORIDE 1000 MG: 1 INJECTION, POWDER, LYOPHILIZED, FOR SOLUTION INTRAVENOUS at 02:02

## 2018-01-01 RX ADMIN — SODIUM CHLORIDE 3000 ML: 0.9 INJECTION, SOLUTION INTRAVENOUS at 05:02

## 2018-01-01 RX ADMIN — PROPOFOL 50 MCG/KG/MIN: 10 INJECTION, EMULSION INTRAVENOUS at 04:02

## 2018-01-01 RX ADMIN — PROPOFOL 35 MCG/KG/MIN: 10 INJECTION, EMULSION INTRAVENOUS at 06:02

## 2018-01-01 RX ADMIN — SODIUM CHLORIDE: 0.9 INJECTION, SOLUTION INTRAVENOUS at 11:02

## 2018-01-01 RX ADMIN — FENTANYL CITRATE: 50 INJECTION, SOLUTION INTRAMUSCULAR; INTRAVENOUS at 08:02

## 2018-01-01 RX ADMIN — FENTANYL CITRATE 200 MCG/HR: 50 INJECTION, SOLUTION INTRAMUSCULAR; INTRAVENOUS at 03:02

## 2018-01-01 RX ADMIN — CISATRACURIUM BESYLATE 3 MCG/KG/MIN: 10 INJECTION INTRAVENOUS at 12:02

## 2018-01-01 RX ADMIN — FOLIC ACID 1 MG: 1 TABLET ORAL at 02:02

## 2018-01-01 RX ADMIN — PROPOFOL 30 MCG/KG/MIN: 10 INJECTION, EMULSION INTRAVENOUS at 05:02

## 2018-01-01 RX ADMIN — ACETAMINOPHEN 500 MG: 500 TABLET ORAL at 08:02

## 2018-01-01 RX ADMIN — PROPOFOL 45 MCG/KG/MIN: 10 INJECTION, EMULSION INTRAVENOUS at 10:02

## 2018-01-01 RX ADMIN — PROPOFOL 30 MCG/KG/MIN: 10 INJECTION, EMULSION INTRAVENOUS at 10:02

## 2018-01-01 RX ADMIN — ACETAMINOPHEN 500 MG: 500 TABLET ORAL at 01:02

## 2018-01-01 RX ADMIN — DEXTROSE 8 MG/HR: 50 INJECTION, SOLUTION INTRAVENOUS at 07:02

## 2018-01-01 RX ADMIN — Medication 1000 MCG: at 02:02

## 2018-01-01 RX ADMIN — IBUPROFEN 400 MG: 400 TABLET, FILM COATED ORAL at 03:02

## 2018-01-01 RX ADMIN — PROPOFOL 30 MCG/KG/MIN: 10 INJECTION, EMULSION INTRAVENOUS at 01:02

## 2018-01-01 RX ADMIN — ACETAMINOPHEN 650 MG: 650 SOLUTION ORAL at 05:02

## 2018-01-01 RX ADMIN — IPRATROPIUM BROMIDE AND ALBUTEROL SULFATE 3 ML: .5; 3 SOLUTION RESPIRATORY (INHALATION) at 03:02

## 2018-01-01 RX ADMIN — VANCOMYCIN HYDROCHLORIDE 1000 MG: 1 INJECTION, POWDER, LYOPHILIZED, FOR SOLUTION INTRAVENOUS at 08:02

## 2018-01-01 RX ADMIN — FOLIC ACID 1 MG: 1 TABLET ORAL at 09:02

## 2018-01-01 RX ADMIN — ACETAMINOPHEN 650 MG: 650 SOLUTION ORAL at 04:02

## 2018-01-01 RX ADMIN — Medication 1000 MCG: at 10:02

## 2018-01-01 RX ADMIN — FUROSEMIDE 80 MG: 10 INJECTION, SOLUTION INTRAMUSCULAR; INTRAVENOUS at 08:02

## 2018-01-01 RX ADMIN — POTASSIUM CHLORIDE 40 MEQ: 1500 TABLET, EXTENDED RELEASE ORAL at 01:02

## 2018-01-01 RX ADMIN — POTASSIUM CHLORIDE 60 MEQ: 1500 TABLET, EXTENDED RELEASE ORAL at 05:02

## 2018-01-01 RX ADMIN — PROPOFOL 30 MCG/KG/MIN: 10 INJECTION, EMULSION INTRAVENOUS at 02:02

## 2018-01-01 RX ADMIN — PROPOFOL 40 MCG/KG/MIN: 10 INJECTION, EMULSION INTRAVENOUS at 02:02

## 2018-01-01 RX ADMIN — METHYLPREDNISOLONE SODIUM SUCCINATE 125 MG: 125 INJECTION, POWDER, FOR SOLUTION INTRAMUSCULAR; INTRAVENOUS at 06:02

## 2018-01-01 RX ADMIN — PROPOFOL 35 MCG/KG/MIN: 10 INJECTION, EMULSION INTRAVENOUS at 05:02

## 2018-01-01 RX ADMIN — CEFTRIAXONE 2 G: 2 INJECTION, SOLUTION INTRAVENOUS at 06:02

## 2018-01-01 RX ADMIN — AZITHROMYCIN MONOHYDRATE 500 MG: 500 INJECTION, POWDER, LYOPHILIZED, FOR SOLUTION INTRAVENOUS at 05:02

## 2018-01-01 RX ADMIN — FENTANYL CITRATE 175 MCG/HR: 50 INJECTION, SOLUTION INTRAMUSCULAR; INTRAVENOUS at 09:02

## 2018-01-01 RX ADMIN — FUROSEMIDE 40 MG: 10 INJECTION, SOLUTION INTRAMUSCULAR; INTRAVENOUS at 09:02

## 2018-01-01 RX ADMIN — AMPICILLIN SODIUM AND SULBACTAM SODIUM 3 G: 2; 1 INJECTION, POWDER, FOR SOLUTION INTRAMUSCULAR; INTRAVENOUS at 06:02

## 2018-01-01 RX ADMIN — TBO-FILGRASTIM 300 MCG: 300 INJECTION, SOLUTION SUBCUTANEOUS at 10:02

## 2018-01-01 RX ADMIN — MAGNESIUM SULFATE HEPTAHYDRATE 3 G: 500 INJECTION, SOLUTION INTRAMUSCULAR; INTRAVENOUS at 02:02

## 2018-01-01 RX ADMIN — TBO-FILGRASTIM 300 MCG: 300 INJECTION, SOLUTION SUBCUTANEOUS at 08:02

## 2018-01-01 RX ADMIN — VANCOMYCIN HYDROCHLORIDE 1000 MG: 1 INJECTION, POWDER, LYOPHILIZED, FOR SOLUTION INTRAVENOUS at 09:02

## 2018-01-01 RX ADMIN — CEFEPIME 1 G: 1 INJECTION, POWDER, FOR SOLUTION INTRAMUSCULAR; INTRAVENOUS at 08:02

## 2018-01-01 RX ADMIN — Medication 1000 MCG: at 09:02

## 2018-01-01 RX ADMIN — FENTANYL CITRATE 75 MCG/HR: 50 INJECTION, SOLUTION INTRAMUSCULAR; INTRAVENOUS at 03:02

## 2018-01-01 RX ADMIN — FUROSEMIDE 40 MG: 10 INJECTION, SOLUTION INTRAMUSCULAR; INTRAVENOUS at 08:02

## 2018-01-01 RX ADMIN — PROPOFOL 50 MCG/KG/MIN: 10 INJECTION, EMULSION INTRAVENOUS at 11:02

## 2018-01-01 RX ADMIN — SODIUM CHLORIDE: 234 INJECTION, SOLUTION, CONCENTRATE INTRAVENOUS at 10:02

## 2018-01-01 RX ADMIN — PROPOFOL 45 MCG/KG/MIN: 10 INJECTION, EMULSION INTRAVENOUS at 09:02

## 2018-01-01 RX ADMIN — ACETAMINOPHEN 1000 MG: 10 INJECTION, SOLUTION INTRAVENOUS at 08:02

## 2018-01-01 RX ADMIN — PROPOFOL 5 MCG/KG/MIN: 10 INJECTION, EMULSION INTRAVENOUS at 09:02

## 2018-01-01 RX ADMIN — PROPOFOL 40 MCG/KG/MIN: 10 INJECTION, EMULSION INTRAVENOUS at 05:02

## 2018-01-01 RX ADMIN — PROPOFOL 50 MG: 10 INJECTION, EMULSION INTRAVENOUS at 09:02

## 2018-01-01 RX ADMIN — AMPICILLIN SODIUM AND SULBACTAM SODIUM 3 G: 2; 1 INJECTION, POWDER, FOR SOLUTION INTRAMUSCULAR; INTRAVENOUS at 07:02

## 2018-01-01 RX ADMIN — SODIUM CHLORIDE: 0.9 INJECTION, SOLUTION INTRAVENOUS at 08:02

## 2018-01-01 RX ADMIN — DEXTROSE 8 MG/HR: 50 INJECTION, SOLUTION INTRAVENOUS at 10:02

## 2018-01-01 RX ADMIN — IBUPROFEN 400 MG: 400 TABLET, FILM COATED ORAL at 01:02

## 2018-01-01 RX ADMIN — FLUCONAZOLE 200 MG: 2 INJECTION INTRAVENOUS at 04:02

## 2018-01-01 RX ADMIN — FENTANYL CITRATE 25 MCG: 50 INJECTION, SOLUTION INTRAMUSCULAR; INTRAVENOUS at 01:02

## 2018-01-01 RX ADMIN — PROPOFOL 30 MCG/KG/MIN: 10 INJECTION, EMULSION INTRAVENOUS at 11:02

## 2018-01-01 RX ADMIN — CISATRACURIUM BESYLATE 2 MCG/KG/MIN: 10 INJECTION INTRAVENOUS at 05:02

## 2018-01-01 RX ADMIN — IOHEXOL 15 ML: 350 INJECTION, SOLUTION INTRAVENOUS at 03:02

## 2018-01-01 RX ADMIN — ACETAMINOPHEN 650 MG: 650 SUPPOSITORY RECTAL at 05:02

## 2018-01-01 RX ADMIN — VANCOMYCIN HYDROCHLORIDE 1000 MG: 1 INJECTION, POWDER, LYOPHILIZED, FOR SOLUTION INTRAVENOUS at 04:02

## 2018-01-01 RX ADMIN — GLYCOPYRROLATE 0.4 MG: 0.2 INJECTION, SOLUTION INTRAMUSCULAR; INTRAVENOUS at 01:02

## 2018-01-01 RX ADMIN — DEXTROSE 8 MG/HR: 50 INJECTION, SOLUTION INTRAVENOUS at 03:02

## 2018-01-01 RX ADMIN — IBUPROFEN 400 MG: 400 TABLET, FILM COATED ORAL at 11:02

## 2018-01-01 RX ADMIN — PROPOFOL 25 MCG/KG/MIN: 10 INJECTION, EMULSION INTRAVENOUS at 12:02

## 2018-01-01 RX ADMIN — PROPOFOL 35 MCG/KG/MIN: 10 INJECTION, EMULSION INTRAVENOUS at 02:02

## 2018-01-01 RX ADMIN — FUROSEMIDE 80 MG: 10 INJECTION, SOLUTION INTRAMUSCULAR; INTRAVENOUS at 04:02

## 2018-01-01 RX ADMIN — METHYLPREDNISOLONE SODIUM SUCCINATE 125 MG: 125 INJECTION, POWDER, FOR SOLUTION INTRAMUSCULAR; INTRAVENOUS at 12:02

## 2018-01-01 RX ADMIN — MEROPENEM 1 G: 1 INJECTION, POWDER, FOR SOLUTION INTRAVENOUS at 08:02

## 2018-01-01 RX ADMIN — DEXTROSE 8 MG/HR: 50 INJECTION, SOLUTION INTRAVENOUS at 11:02

## 2018-01-01 RX ADMIN — FENTANYL CITRATE 50 MCG: 50 INJECTION, SOLUTION INTRAMUSCULAR; INTRAVENOUS at 09:02

## 2018-01-01 RX ADMIN — FUROSEMIDE 40 MG: 10 INJECTION, SOLUTION INTRAMUSCULAR; INTRAVENOUS at 11:02

## 2018-01-01 RX ADMIN — Medication 1000 MCG: at 06:02

## 2018-01-01 RX ADMIN — PROPOFOL 40 MCG/KG/MIN: 10 INJECTION, EMULSION INTRAVENOUS at 10:02

## 2018-01-01 RX ADMIN — FENTANYL CITRATE 200 MCG/HR: 50 INJECTION, SOLUTION INTRAMUSCULAR; INTRAVENOUS at 04:02

## 2018-01-01 RX ADMIN — FENTANYL CITRATE 50 MCG: 50 INJECTION, SOLUTION INTRAMUSCULAR; INTRAVENOUS at 01:02

## 2018-01-01 RX ADMIN — ACETAMINOPHEN 500 MG: 500 TABLET ORAL at 05:02

## 2018-01-01 RX ADMIN — FLUCONAZOLE IN SODIUM CHLORIDE 800 MG: 2 INJECTION, SOLUTION INTRAVENOUS at 03:02

## 2018-01-01 RX ADMIN — PROPOFOL 20 MCG/KG/MIN: 10 INJECTION, EMULSION INTRAVENOUS at 08:02

## 2018-01-01 RX ADMIN — TBO-FILGRASTIM 300 MCG: 300 INJECTION, SOLUTION SUBCUTANEOUS at 01:02

## 2018-01-01 RX ADMIN — PROPOFOL 50 MCG/KG/MIN: 10 INJECTION, EMULSION INTRAVENOUS at 12:02

## 2018-01-01 RX ADMIN — ONDANSETRON 8 MG: 2 INJECTION INTRAMUSCULAR; INTRAVENOUS at 03:02

## 2018-01-01 RX ADMIN — ACETAMINOPHEN 650 MG: 650 SOLUTION ORAL at 08:02

## 2018-01-01 RX ADMIN — FUROSEMIDE 20 MG: 10 INJECTION, SOLUTION INTRAMUSCULAR; INTRAVENOUS at 04:02

## 2018-01-01 RX ADMIN — IBUPROFEN 400 MG: 400 TABLET, FILM COATED ORAL at 05:02

## 2018-01-01 RX ADMIN — AZITHROMYCIN MONOHYDRATE 500 MG: 500 INJECTION, POWDER, LYOPHILIZED, FOR SOLUTION INTRAVENOUS at 09:02

## 2018-01-01 RX ADMIN — FUROSEMIDE 80 MG: 10 INJECTION, SOLUTION INTRAMUSCULAR; INTRAVENOUS at 05:02

## 2018-01-01 RX ADMIN — MEROPENEM 1 G: 1 INJECTION, POWDER, FOR SOLUTION INTRAVENOUS at 02:02

## 2018-01-01 RX ADMIN — FLUCONAZOLE IN SODIUM CHLORIDE 400 MG: 2 INJECTION, SOLUTION INTRAVENOUS at 03:02

## 2018-01-01 RX ADMIN — IOHEXOL 100 ML: 350 INJECTION, SOLUTION INTRAVENOUS at 03:02

## 2018-01-01 RX ADMIN — GELATIN ABSORBABLE SPONGE SIZE 100 1 APPLICATOR: MISC at 05:02

## 2018-01-01 RX ADMIN — CISATRACURIUM BESYLATE 2 MCG/KG/MIN: 10 INJECTION INTRAVENOUS at 09:02

## 2018-01-01 RX ADMIN — POTASSIUM CHLORIDE 40 MEQ: 1500 TABLET, EXTENDED RELEASE ORAL at 06:02

## 2018-01-01 RX ADMIN — PROPOFOL 15 MCG/KG/MIN: 10 INJECTION, EMULSION INTRAVENOUS at 08:02

## 2018-01-01 RX ADMIN — PROPOFOL 20 MCG/KG/MIN: 10 INJECTION, EMULSION INTRAVENOUS at 11:02

## 2018-01-01 RX ADMIN — LORAZEPAM 1 MG: 2 INJECTION INTRAMUSCULAR; INTRAVENOUS at 02:02

## 2018-01-01 RX ADMIN — METHYLPREDNISOLONE SODIUM SUCCINATE 80 MG: 40 INJECTION, POWDER, FOR SOLUTION INTRAMUSCULAR; INTRAVENOUS at 08:02

## 2018-01-01 RX ADMIN — SODIUM CHLORIDE: 0.9 INJECTION, SOLUTION INTRAVENOUS at 12:02

## 2018-02-12 PROBLEM — J18.9 PNEUMONIA OF LEFT LOWER LOBE DUE TO INFECTIOUS ORGANISM: Status: ACTIVE | Noted: 2018-01-01

## 2018-02-12 PROBLEM — J15.9 COMMUNITY ACQUIRED BACTERIAL PNEUMONIA: Status: ACTIVE | Noted: 2018-01-01

## 2018-02-12 PROBLEM — A41.9 SEPSIS: Status: ACTIVE | Noted: 2018-01-01

## 2018-02-12 PROBLEM — A41.9 SEPSIS: Chronic | Status: ACTIVE | Noted: 2018-01-01

## 2018-02-12 PROBLEM — E87.6 HYPOKALEMIA: Status: ACTIVE | Noted: 2018-01-01

## 2018-02-12 PROBLEM — E87.6 HYPOKALEMIA: Chronic | Status: ACTIVE | Noted: 2018-01-01

## 2018-02-12 PROBLEM — I10 ESSENTIAL HYPERTENSION: Status: ACTIVE | Noted: 2018-01-01

## 2018-02-12 PROBLEM — I10 ESSENTIAL HYPERTENSION: Chronic | Status: ACTIVE | Noted: 2018-01-01

## 2018-02-12 PROBLEM — D61.818 PANCYTOPENIA: Status: ACTIVE | Noted: 2018-01-01

## 2018-02-12 NOTE — ED PROVIDER NOTES
"Encounter Date: 2/12/2018    SCRIBE #1 NOTE: I, Ban Bruno, am scribing for, and in the presence of,  Kirk Vicente MD. I have scribed the following portions of the note - Other sections scribed: ROS and HPI.       History     Chief Complaint   Patient presents with    Fall     " my legs keep giving out." No LOC. Right elbow pain. Hx of schizophrenia. "I want to get my cirrhosis checked out."      CC: Fall  HPI: This 57 y.o. male with  a past medical history of Hypertension, presents to the ED complaining of a fall after his legs gave out this morning. He reports hitting his head. Denies LOC/headache/neck pain. Denies N/V. Denies numbness, weakness, speech difficulty or any other associated sx.  He wants to get checked out for his heart problems and liver problems. He reports "my heart stops beating sometimes". Currently denies this sx. Reports intermittent chest pain for "yrs", however currently denies it. He had x3 episodes of emesis today. Denies abdominal pain and/or any urinary sx. Denies hallucinations, SI/HI. No prior medical intervention.       The history is provided by the patient. No  was used.     Review of patient's allergies indicates:  No Known Allergies  Past Medical History:   Diagnosis Date    Hypertension      History reviewed. No pertinent surgical history.  History reviewed. No pertinent family history.  Social History   Substance Use Topics    Smoking status: Never Smoker    Smokeless tobacco: Never Used    Alcohol use No     Review of Systems   Constitutional: Negative for chills and fever.   HENT: Negative for ear pain and sore throat.    Eyes: Negative for pain.   Respiratory: Positive for cough (productive). Negative for shortness of breath.    Cardiovascular: Positive for chest pain (intermittent, currently no pain).   Gastrointestinal: Positive for diarrhea (x3 epi in last 24 hrs). Negative for abdominal pain, nausea and vomiting.   Genitourinary: Negative for " dysuria.   Musculoskeletal: Negative for back pain.        (-) arm or leg problems   Skin: Negative for rash.   Neurological: Negative for syncope and headaches.   Psychiatric/Behavioral: Negative for hallucinations and suicidal ideas.       Physical Exam     Initial Vitals [02/12/18 1254]   BP Pulse Resp Temp SpO2   124/62 101 18 (!) 100.5 °F (38.1 °C) 100 %      MAP       82.67         Physical Exam  The patient was examined specifically for the following:   General:No significant distress, Good color, Warm and dry. Head and neck:Scalp atraumatic, Neck supple. Neurological:Appropriate conversation, Gross motor deficits. Eyes:Conjugate gaze, Clear corneas. ENT: No epistaxis. Cardiac: Regular rate and rhythm, Grossly normal heart tones. Pulmonary: Wheezing, Rales. Gastrointestinal: Abdominal tenderness, Abdominal distention. Musculoskeletal: Extremity deformity, Apparent pain with range of motion of the joints. Skin: Rash.   The findings on examination were normal except for the following: The patient has very poor personal hygiene.  The lungs are remarkable for minimal wheezing and rales in the left base.  Heart tones are normal.  The patient has a regular rate and rhythm.  The temperature is 100.5.  Oxygen saturation percent there is no evidence of respiratory distress.  The patient seems simple minded.  He has oxygen saturations are 100%.  There is no rhinorrhea.  ED Course   Critical Care  Date/Time: 3/23/2018 10:18 AM  Performed by: RADHA GOMEZ  Authorized by: MAGDA JEREZ   Direct patient critical care time: 22 minutes  Additional history critical care time: 11 minutes  Ordering / reviewing critical care time: 11 minutes  Documentation critical care time: 17 minutes  Consulting other physicians critical care time: 4 minutes  Consult with family critical care time: 3 minutes  Total critical care time (exclusive of procedural time) : 68 minutes  Critical care time was exclusive of separately billable  procedures and treating other patients and teaching time.  Critical care was necessary to treat or prevent imminent or life-threatening deterioration of the following conditions: circulatory failure and respiratory failure.  Critical care was time spent personally by me on the following activities: development of treatment plan with patient or surrogate, discussions with primary provider, evaluation of patient's response to treatment, examination of patient, obtaining history from patient or surrogate, ordering and performing treatments and interventions, ordering and review of laboratory studies, ordering and review of radiographic studies, pulse oximetry, review of old charts and re-evaluation of patient's condition.        Labs Reviewed   COMPREHENSIVE METABOLIC PANEL - Abnormal; Notable for the following:        Result Value    Sodium 127 (*)     Potassium 2.4 (*)     Chloride 85 (*)     CO2 32 (*)     Calcium 7.8 (*)     Albumin 3.2 (*)     Alkaline Phosphatase 49 (*)      (*)     All other components within normal limits    Narrative:     K   critical result(s) called and verbal readback obtained from DR. GOMEZ., 02/12/2018 16:06   CBC W/ AUTO DIFFERENTIAL - Abnormal; Notable for the following:     WBC 2.49 (*)     RBC 3.66 (*)     Hemoglobin 8.9 (*)     Hematocrit 26.4 (*)     MCV 72 (*)     MCH 24.3 (*)     RDW 15.4 (*)     Platelets 79 (*)     Lymph # 0.2 (*)     Mono # 0.2 (*)     Gran% 81.6 (*)     Lymph% 9.6 (*)     Platelet Estimate Decreased (*)     All other components within normal limits   URINALYSIS - Abnormal; Notable for the following:     Protein, UA 1+ (*)     Ketones, UA Trace (*)     Occult Blood UA 2+ (*)     All other components within normal limits   URINALYSIS MICROSCOPIC - Abnormal; Notable for the following:     Hyaline Casts, UA 5 (*)     All other components within normal limits   CULTURE, URINE   CULTURE, BLOOD   CULTURE, BLOOD   CULTURE, RESPIRATORY   INFLUENZA A AND B  ANTIGEN   LACTIC ACID, PLASMA     EKG Readings: (Independently Interpreted)   This patient is in a sinus tachycardia with a heart rate of 103.  The HI QRS and QT intervals are normal.  There is poor R-wave progression across precordium.  There is no definite evidence of acute myocardial infarction or malignant arrhythmia.       X-Rays:   Independently Interpreted Readings:   Other Readings:  Chest x-ray reveals a left lower lobe pneumonia    Medical decision making: This patient presents to the emergency room with a borderline low blood pressure.  He has a left lower lobe pneumonia on chest x-ray.  He has a cough.  His potassium is 2.4 sodium is 127 his chloride is low.  The studies were drawn before IV fluids were started.  I will admit him for replacement of sodium and potassium IV antibiotics for pneumonia observation for early septic shock.  I discussed this case with , who agrees with the treatment plan.  The patient had a low oxygen saturation around 89%.  He will require oxygen supplementation.                Scribe Attestation:   Scribe #1: I performed the above scribed service and the documentation accurately describes the services I performed. I attest to the accuracy of the note.    Attending Attestation:           Physician Attestation for Scribe:  Physician Attestation Statement for Scribe #1: I, Kirk Vicente MD, reviewed documentation, as scribed by Ban Bruno in my presence, and it is both accurate and complete.                 ED Course      Clinical Impression:   The primary encounter diagnosis was Pneumonia of left lower lobe due to infectious organism. Diagnoses of Hypoxia, Hyponatremia, Hypokalemia, and Anemia, unspecified type were also pertinent to this visit.                           Kirk Vicente MD  02/12/18 3371       Kirk Vicente MD  03/23/18 1012

## 2018-02-12 NOTE — ED TRIAGE NOTES
Pt arrived to ED c/o fall happened this morning. Pt states that his legs gave out and fell, hit his head to the ground, LOC. Pt denies pain, SOB, nausea, vomiting, chest pain.

## 2018-02-13 NOTE — ASSESSMENT & PLAN NOTE
The patient has a CURB-65 score of 0 and does not meet criteria for healthcare-associated pneumonia.  I have reviewed the chest X-ray and it reveals a left lower lobe infiltrate that is suspicious for pneumonia.  Oxygen saturations are stable.  Blood and sputum cultures are pending. On IV antibiotic therapy.Ordered all TB panels.with AFB culture,smear,gram stain,goldenferon,also PPD has been placed.

## 2018-02-13 NOTE — H&P
"[facility] Hospital Medicine  History & Physical    Patient Name: Dwight Bolton  MRN: [id number]  Admission Date: 2/12/2018  Attending Physician: Shandra Shepherd MD   Primary Care Provider: Primary Doctor No         Patient information was obtained from patient.     Subjective:     Principal Problem:Community acquired bacterial pneumonia    Chief Complaint: Fall today.    HPI: Mr. Dwight Bolton is a 57 y.o. male with essential hypertension who presents to [facility] ED with complaints of fall today.  He has been feeling weak "for a while" and is always short of breath but says that he came here because he had a fall today.  He denies any loss of consciousness, lightheadedness, nor did he sustain any head trauma.  He denies any antecedent chest pain or palpitations, but he does say that he is always short of breath and it's not any worse than usual.  He denies any fever, chills, nausea, vomiting, nor headaches.  He decided to come here when he couldn't stand back up.  He is otherwise a very poor historian.    Chart Review:  Patient has not had any recent hospitalizations or outpatient clinic visits within the system.    Past Medical History:   Diagnosis Date    Hypertension        History reviewed. No pertinent surgical history.    Review of patient's allergies indicates:  No Known Allergies    No current facility-administered medications on file prior to encounter.      No current outpatient prescriptions on file prior to encounter.     Family History     None        Social History Main Topics    Smoking status: Current Every Day Smoker     Packs/day: 1.00    Smokeless tobacco: Never Used    Alcohol use No    Drug use: No    Sexual activity: Not on file     Review of Systems   Constitutional: Positive for activity change, appetite change and fatigue. Negative for chills, diaphoresis, fever and unexpected weight change.   HENT: Negative.    Eyes: Negative.    Respiratory: Negative for cough, " chest tightness, shortness of breath and wheezing.    Cardiovascular: Negative for chest pain, palpitations and leg swelling.   Gastrointestinal: Negative for abdominal distention, abdominal pain, blood in stool, constipation, diarrhea, nausea and vomiting.   Genitourinary: Negative for dysuria.   Musculoskeletal: Negative.    Skin: Negative.    Neurological: Positive for weakness. Negative for dizziness, seizures, syncope and light-headedness.   Psychiatric/Behavioral: Negative.      Objective:     Vital Signs (Most Recent):  Temp: 100 °F (37.8 °C) (02/13/18 0020)  Pulse: 107 (02/13/18 0020)  Resp: 18 (02/13/18 0020)  BP: (!) 117/56 (02/13/18 0020)  SpO2: 95 % (02/13/18 0020) Vital Signs (24h Range):  Temp:  [99.3 °F (37.4 °C)-100.5 °F (38.1 °C)] 100 °F (37.8 °C)  Pulse:  [] 107  Resp:  [17-20] 18  SpO2:  [84 %-100 %] 95 %  BP: ()/(53-74) 117/56     Weight: 101.3 kg (223 lb 5.2 oz)  Body mass index is 34.98 kg/m².    Physical Exam   Constitutional: He is oriented to person, place, and time. He appears well-developed and well-nourished. No distress.   obese   HENT:   Head: Normocephalic and atraumatic.   Right Ear: External ear normal.   Left Ear: External ear normal.   Nose: Nose normal.   Eyes: Right eye exhibits no discharge. Left eye exhibits no discharge.   Neck: Normal range of motion.   Cardiovascular: Normal rate, regular rhythm, normal heart sounds and intact distal pulses.  Exam reveals no gallop and no friction rub.    No murmur heard.  Pulmonary/Chest: Effort normal and breath sounds normal. No respiratory distress. He has no wheezes. He has no rales. He exhibits no tenderness.   Abdominal: Soft. Bowel sounds are normal. He exhibits no distension. There is no tenderness. There is no rebound and no guarding.   Musculoskeletal: Normal range of motion. He exhibits no edema.   Neurological: He is alert and oriented to person, place, and time.   Skin: Skin is warm and dry. He is not diaphoretic.  No erythema.   Psychiatric: He has a normal mood and affect. His behavior is normal. Judgment and thought content normal.   Nursing note and vitals reviewed.          Significant Labs: All pertinent labs within the past 24 hours have been reviewed.    Significant Imaging: I have reviewed and interpreted all pertinent imaging results/findings within the past 24 hours.    Assessment/Plan:     * Community acquired bacterial pneumonia    The patient has a CURB-65 score of 0 and does not meet criteria for healthcare-associated pneumonia.  I have reviewed the chest X-ray and it reveals a left lower lobe infiltrate that is suspicious for pneumonia.  Oxygen saturations are stable.  Blood and sputum cultures are pending.  Will start empiric antibiotic therapy.        Sepsis    This patient meets criteria for sepsis given fever, tachycardia, leukopenia, and pneumonia; there is no evidence of end-organ dysfunction.  Blood and urine cultures are pending; will start IV fluid hydration and broad spectrum antibiotics.        Pancytopenia    This admission is Mr. Bolton's first encounter within the system so, unfortunately, we have no previous labwork to which to compare.  Her differential is significant for a granulocytosis and occasional tear drops cells.  I do not think this is DIC given the absence of schistocytes on peripheral smear and I doubt TTP given his normal mentation and renal function, though the absence of these findings do not exclude those conditions.  Will obtain an HIV, acute hepatitis panel, and in the setting of his pneumonia, a PPD test.  Will also consult Hematology for further recommendations.        Hypokalemia    Given that his calcium is also low, I suspect hypomagnesemia.  Will obtain a magnesium level and replete his potassium orally; will recheck his potassium level in the morning.        Essential hypertension    His blood pressure is well-controlled and he is not on medications; will provide as-needed  clonidine.          VTE Risk Mitigation         Ordered     Medium Risk of VTE  Once      02/12/18 1197             Total time spent on case: 45 minutes.        Theodora Monson M.D.  Staff Nocturnist  Department of Hospital Medicine  [facility]  Pager: [phone]

## 2018-02-13 NOTE — ASSESSMENT & PLAN NOTE
This patient meets criteria for sepsis given fever, tachycardia, leukopenia, and pneumonia; there is no evidence of end-organ dysfunction.  Blood and urine cultures are pending;on  IV fluid hydration and broad spectrum antibiotics.

## 2018-02-13 NOTE — PROGRESS NOTES
02/13/18 0820   Vital Signs   Temp (!) 101.2 °F (38.4 °C)   Pulse 99   Resp (!) 22   SpO2 96 %   O2 Device (Oxygen Therapy) nasal cannula   BP (!) 90/54   MAP (mmHg) 67   Results for GEENA BENJAMIN (MRN [id number]) as of 2/13/2018 08:26   Ref. Range 2/13/2018 04:46   WBC Latest Ref Range: 3.90 - 12.70 K/uL 1.53 (LL)   RBC Latest Ref Range: 4.60 - 6.20 M/uL 3.24 (L)   Hemoglobin Latest Ref Range: 14.0 - 18.0 g/dL 7.9 (L)   Hematocrit Latest Ref Range: 40.0 - 54.0 % 24.0 (L)     Mews score 5 . Patient awake, alert, oriented. No apparent distress noted at this time. Dr. Espinal Notified.

## 2018-02-13 NOTE — ASSESSMENT & PLAN NOTE
The patient has a CURB-65 score of 0 and does not meet criteria for healthcare-associated pneumonia.  I have reviewed the chest X-ray and it reveals a left lower lobe infiltrate that is suspicious for pneumonia.  Oxygen saturations are stable.  Blood and sputum cultures are pending.  Will start empiric antibiotic therapy.

## 2018-02-13 NOTE — SUBJECTIVE & OBJECTIVE
Past Medical History:   Diagnosis Date    Hypertension        History reviewed. No pertinent surgical history.    Review of patient's allergies indicates:  No Known Allergies    No current facility-administered medications on file prior to encounter.      No current outpatient prescriptions on file prior to encounter.     Family History     None        Social History Main Topics    Smoking status: Current Every Day Smoker     Packs/day: 1.00    Smokeless tobacco: Never Used    Alcohol use No    Drug use: No    Sexual activity: Not on file     Review of Systems   Constitutional: Positive for activity change, appetite change and fatigue. Negative for chills, diaphoresis, fever and unexpected weight change.   HENT: Negative.    Eyes: Negative.    Respiratory: Negative for cough, chest tightness, shortness of breath and wheezing.    Cardiovascular: Negative for chest pain, palpitations and leg swelling.   Gastrointestinal: Negative for abdominal distention, abdominal pain, blood in stool, constipation, diarrhea, nausea and vomiting.   Genitourinary: Negative for dysuria.   Musculoskeletal: Negative.    Skin: Negative.    Neurological: Positive for weakness. Negative for dizziness, seizures, syncope and light-headedness.   Psychiatric/Behavioral: Negative.      Objective:     Vital Signs (Most Recent):  Temp: 100 °F (37.8 °C) (02/13/18 0020)  Pulse: 107 (02/13/18 0020)  Resp: 18 (02/13/18 0020)  BP: (!) 117/56 (02/13/18 0020)  SpO2: 95 % (02/13/18 0020) Vital Signs (24h Range):  Temp:  [99.3 °F (37.4 °C)-100.5 °F (38.1 °C)] 100 °F (37.8 °C)  Pulse:  [] 107  Resp:  [17-20] 18  SpO2:  [84 %-100 %] 95 %  BP: ()/(53-74) 117/56     Weight: 101.3 kg (223 lb 5.2 oz)  Body mass index is 34.98 kg/m².    Physical Exam   Constitutional: He is oriented to person, place, and time. He appears well-developed and well-nourished. No distress.   obese   HENT:   Head: Normocephalic and atraumatic.   Right Ear: External  ear normal.   Left Ear: External ear normal.   Nose: Nose normal.   Eyes: Right eye exhibits no discharge. Left eye exhibits no discharge.   Neck: Normal range of motion.   Cardiovascular: Normal rate, regular rhythm, normal heart sounds and intact distal pulses.  Exam reveals no gallop and no friction rub.    No murmur heard.  Pulmonary/Chest: Effort normal and breath sounds normal. No respiratory distress. He has no wheezes. He has no rales. He exhibits no tenderness.   Abdominal: Soft. Bowel sounds are normal. He exhibits no distension. There is no tenderness. There is no rebound and no guarding.   Musculoskeletal: Normal range of motion. He exhibits no edema.   Neurological: He is alert and oriented to person, place, and time.   Skin: Skin is warm and dry. He is not diaphoretic. No erythema.   Psychiatric: He has a normal mood and affect. His behavior is normal. Judgment and thought content normal.   Nursing note and vitals reviewed.          Significant Labs: All pertinent labs within the past 24 hours have been reviewed.    Significant Imaging: I have reviewed and interpreted all pertinent imaging results/findings within the past 24 hours.

## 2018-02-13 NOTE — PLAN OF CARE
02/13/18 1500   Nutrition, Imbalanced: Inadequate Oral Intake (Adult)   Related Risk Factors (Nutrition Imbalance, Inadequate Oral Intake) appetite decreased;knowledge deficit   Signs and Symptoms (Nutrition Imbalance, Inadequate Oral Intake: Signs and Symptoms) diarrhea/malabsorption;weakness/lethargy   Improved Oral Intake unable to achieve outcome   Supportive Measures self-care encouraged;self-responsibility promoted;verbalization of feelings encouraged;relaxation techniques promoted   Oral Nutrition Promotion social interaction promoted   Bariatric Surgery (Open/Laparoscopic) (Adult)   VTE Required Core Measure Pharmacological prophylaxis initiated/maintained   Sepsis (Adult)   Problems Assessed (Sepsis) all   Problems Present (Sepsis) undernutrition   Bleeding Precautions blood pressure closely monitored;gentle oral care promoted;monitored for signs of bleeding   O2 Consumption Minimization sleep/rest promoted   Infection Prevention barrier precautions utilized;bronchial hygiene promoted;environmental surveillance;equipment surfaces disinfected;hydration promoted;personal protective equipment utilized;nutrition promoted;rest/sleep promoted;single patient room provided   Isolation Precautions environmental surveillance;airborne precautions maintained   Fluid/Electrolyte Management fluids provided   Infection Management cultures obtained and sent to lab   Cooling Thermoregulation Maintenance chilled fluids administered;lightweight clothing/blankets used;room temperature decreased;tepid bath given   Skin Protection Incontinence pads;Skin sealant/moisture barrier   Plan of Care Review   Plan Of Care Reviewed With patient     Pt AAOX3 , no noted distress. Safety precautions in place.

## 2018-02-13 NOTE — PROGRESS NOTES
"[facility] Hospital Medicine  Progress Note    Patient Name: Dwight Bolton  MRN: [id number]  Patient Class: IP- Inpatient   Admission Date: 2/12/2018  Length of Stay: 1 days  Attending Physician: Shandra Shepherd MD  Primary Care Provider: Primary Doctor No        Subjective:     Principal Problem:Community acquired bacterial pneumonia    HPI:  Mr. Dwight Bolton is a 57 y.o. male with essential hypertension who presents to [facility] ED with complaints of fall today.  He has been feeling weak "for a while" and is always short of breath but says that he came here because he had a fall today.  He denies any loss of consciousness, lightheadedness, nor did he sustain any head trauma.  He denies any antecedent chest pain or palpitations, but he does say that he is always short of breath and it's not any worse than usual.  He denies any fever, chills, nausea, vomiting, nor headaches.  He decided to come here when he couldn't stand back up.  He is otherwise a very poor historian.    Hospital Course:  Mr. Dwight Bolton is a 57 y.o. male with essential hypertension who presents to [facility] ED with complaints of fall .  He has been feeling weak "for a while" and is always short of breath but says that he came here because he had a fall .  He denies any loss of consciousness, lightheadedness, nor did he sustain any head trauma.  He denies any antecedent chest pain or palpitations, but he does say that he is always short of breath and it's not any worse than usual.  He denies any fever, chills, nausea, vomiting, nor headaches.  He decided to come here when he couldn't stand back up.  He is otherwise a very poor historian.chest x ray  show LLL pneumonia,patient has been started on IV Abx,he is septic with caitlin and leucocytosis,also pancytopenic HIV is negative and hepatitis panel is pending.  Ordered all TB panels.with AFB culture,smear,gram stain,goldenferon,also PPD has been placed.    Past Medical History: "   Diagnosis Date    Hypertension        History reviewed. No pertinent surgical history.    Review of patient's allergies indicates:  No Known Allergies    No current facility-administered medications on file prior to encounter.      No current outpatient prescriptions on file prior to encounter.     Family History     None        Social History Main Topics    Smoking status: Current Every Day Smoker     Packs/day: 1.00    Smokeless tobacco: Never Used    Alcohol use No    Drug use: No    Sexual activity: Not on file     Review of Systems   Constitutional: Positive for activity change, appetite change, fatigue and fever. Negative for chills, diaphoresis and unexpected weight change.   HENT: Negative.    Eyes: Negative.    Respiratory: Negative for cough, chest tightness, shortness of breath and wheezing.    Cardiovascular: Negative for chest pain, palpitations and leg swelling.   Gastrointestinal: Negative for abdominal distention, abdominal pain, blood in stool, constipation, diarrhea, nausea and vomiting.   Genitourinary: Negative for dysuria.   Musculoskeletal: Negative.    Skin: Negative.    Neurological: Positive for weakness. Negative for dizziness, seizures, syncope and light-headedness.   Psychiatric/Behavioral: Negative.      Objective:     Vital Signs (Most Recent):  Temp: (!) 101.2 °F (38.4 °C) (02/13/18 0820)  Pulse: 99 (02/13/18 0820)  Resp: (!) 22 (02/13/18 0820)  BP: (!) 90/54 (02/13/18 0820)  SpO2: 96 % (02/13/18 0820) Vital Signs (24h Range):  Temp:  [99.3 °F (37.4 °C)-102.8 °F (39.3 °C)] 101.2 °F (38.4 °C)  Pulse:  [] 99  Resp:  [17-22] 22  SpO2:  [84 %-100 %] 96 %  BP: ()/(53-74) 90/54     Weight: 101.3 kg (223 lb 5.2 oz)  Body mass index is 34.98 kg/m².    Physical Exam   Constitutional: He is oriented to person, place, and time. He appears well-developed and well-nourished. No distress.   obese   HENT:   Head: Normocephalic and atraumatic.   Right Ear: External ear normal.    Left Ear: External ear normal.   Nose: Nose normal.   Eyes: Right eye exhibits no discharge. Left eye exhibits no discharge.   Neck: Normal range of motion.   Cardiovascular: Normal rate, regular rhythm, normal heart sounds and intact distal pulses.  Exam reveals no gallop and no friction rub.    No murmur heard.  Pulmonary/Chest: Effort normal and breath sounds normal. No respiratory distress. He has no wheezes. He has no rales. He exhibits no tenderness.   Abdominal: Soft. Bowel sounds are normal. He exhibits no distension. There is no tenderness. There is no rebound and no guarding.   Musculoskeletal: Normal range of motion. He exhibits no edema.   Neurological: He is alert and oriented to person, place, and time.   Skin: Skin is warm and dry. He is not diaphoretic. No erythema.   Psychiatric: He has a normal mood and affect. His behavior is normal. Judgment and thought content normal.   Nursing note and vitals reviewed.          Significant Labs: All pertinent labs within the past 24 hours have been reviewed.    Significant Imaging: I have reviewed and interpreted all pertinent imaging results/findings within the past 24 hours.    Assessment/Plan:      * Community acquired bacterial pneumonia    The patient has a CURB-65 score of 0 and does not meet criteria for healthcare-associated pneumonia.  I have reviewed the chest X-ray and it reveals a left lower lobe infiltrate that is suspicious for pneumonia.  Oxygen saturations are stable.  Blood and sputum cultures are pending. On IV antibiotic therapy.Ordered all TB panels.with AFB culture,smear,gram stain,goldenferon,also PPD has been placed.        Essential hypertension    His blood pressure is well-controlled and he is not on medications; will provide as-needed clonidine.        Hypokalemia    Given that his calcium is also low, I suspect hypomagnesemia.  Will obtain a magnesium level and replete his potassium orally; will recheck his potassium ,reoplace as  needed.        Pancytopenia    This admission is Mr. Bolton's first encounter within the system so, unfortunately, we have no previous labwork to which to compare.  Her differential is significant for a granulocytosis and occasional tear drops cells.  I do not think this is DIC given the absence of schistocytes on peripheral smear and I doubt TTP given his normal mentation and renal function, though the absence of these findings do not exclude those conditions. Rapid HIV is negative,acute hepatitis panel, and in the setting of his pneumonia, a PPD test.  consult Hematology for further recommendations.        Sepsis    This patient meets criteria for sepsis given fever, tachycardia, leukopenia, and pneumonia; there is no evidence of end-organ dysfunction.  Blood and urine cultures are pending;on  IV fluid hydration and broad spectrum antibiotics.          VTE Risk Mitigation         Ordered     Medium Risk of VTE  Once      02/12/18 8661              Shandra Shepherd MD  Department of Hospital Medicine   [facility]

## 2018-02-13 NOTE — ASSESSMENT & PLAN NOTE
Given that his calcium is also low, I suspect hypomagnesemia.  Will obtain a magnesium level and replete his potassium orally; will recheck his potassium ,reoplace as needed.

## 2018-02-13 NOTE — ASSESSMENT & PLAN NOTE
His blood pressure is well-controlled and he is not on medications; will provide as-needed clonidine.

## 2018-02-13 NOTE — CONSULTS
[facility]  Hematology/Oncology  Consult Note    Patient Name: Dwight Bolton  MRN: [id number]  Admission Date: 2/12/2018  Hospital Length of Stay: 1 days  Code Status: Full Code   Attending Provider: Shandra Shepherd MD  Consulting Provider: Anthony Escobar MD  Primary Care Physician: Primary Doctor No  Principal Problem:Community acquired bacterial pneumonia    Inpatient consult to Hematology/Oncology  Consult performed by: ANTHONY ESCBOAR  Consult ordered by: KAPIL GARCIA        Subjective:     HPI:     Mr. Dwight Bolton is a 57 y.o. male with essential hypertension who presents to [facility] ED after a fall.  He was found to have CAP and admitted for further evaluation and tx in the context of pancytopenia. Pt has been having progressive weakness for the last several weeks. Has decreased appetite and intermittent fever. Denies night sweats. Pt not able to give me details- poor informant of his condition.   Denies mucocutaneous bleeding.     Oncology Treatment Plan:   [No treatment plan]    Medications:  Continuous Infusions:   sodium chloride 0.9% 150 mL/hr at 02/12/18 2302     Scheduled Meds:   albuterol-ipratropium 2.5mg-0.5mg/3mL  3 mL Nebulization Q6H    azithromycin  500 mg Intravenous Q24H    cefTRIAXone (ROCEPHIN) IVPB  2 g Intravenous Q24H    [START ON 2/14/2018] pneumoc 13-serafin conj-dip cr(PF)  0.5 mL Intramuscular Once    potassium chloride  40 mEq Oral Q4H     PRN Meds:acetaminophen, albuterol-ipratropium 2.5mg-0.5mg/3mL, cloNIDine, [START ON 2/14/2018] influenza, ondansetron, promethazine (PHENERGAN) IVPB, ramelteon, senna-docusate 8.6-50 mg     Review of patient's allergies indicates:  No Known Allergies     Past Medical History:   Diagnosis Date    Hypertension      History reviewed. No pertinent surgical history.  Family History     None        Social History Main Topics    Smoking status: Current Every Day Smoker     Packs/day: 1.00    Smokeless tobacco: Never Used     Alcohol use No    Drug use: No    Sexual activity: Not on file       Review of Systems     Constitutional: + fatigue   Eyes: no visual changes   ENT: no nasal congestion. Denies sore throat with odynophagia   Respiratory: cough and congestion   Cardiovascular: no chest pain or palpitations   Gastrointestinal: no nausea, vomiting or abdominal pain. Normal bowl habits   Hematologic/Lymphatic:   See HPI  Musculoskeletal: muscle weakness  Neurological: no seizures or tremors, gait or balance prblems  Skin: No rashes or lesions  Psych: Denies any anxiety, depression or insomnia      Objective:     Vital Signs (Most Recent):  Temp: (!) 101.2 °F (38.4 °C) (02/13/18 0820)  Pulse: 99 (02/13/18 0820)  Resp: (!) 22 (02/13/18 0820)  BP: (!) 90/54 (02/13/18 0820)  SpO2: 96 % (02/13/18 0820) Vital Signs (24h Range):  Temp:  [99.3 °F (37.4 °C)-102.8 °F (39.3 °C)] 101.2 °F (38.4 °C)  Pulse:  [] 99  Resp:  [17-22] 22  SpO2:  [84 %-100 %] 96 %  BP: ()/(53-74) 90/54     Weight: 101.3 kg (223 lb 5.2 oz)  Body mass index is 34.98 kg/m².  Body surface area is 2.19 meters squared.      Intake/Output Summary (Last 24 hours) at 02/13/18 0947  Last data filed at 02/13/18 0914   Gross per 24 hour   Intake             4418 ml   Output                0 ml   Net             4418 ml       Physical Exam    General: NAD. Resting in bed    Head: normocephalic, atraumatic   Eyes: conjunctivae pale    Throat: No erythema or post nasal discharge   Neck: supple, no LAD   Lungs: rhonchi and crackles bilaterally, left > R  Heart: S1, S2, tachycardic   Abdomen: + BS x 4 quadrants, soft, non tender.   Extremities: no cyanosis or edema.   Skin: turgor normal, no erythema or rashes.  MS: move all extremities  Neuro: A&O x 2  Psy: disheveled     Significant Labs:   CBC:   Recent Labs  Lab 02/12/18  1510 02/13/18  0446   WBC 2.49* 1.53*   HGB 8.9* 7.9*   HCT 26.4* 24.0*   PLT 79* 74*   , CMP:   Recent Labs  Lab 02/12/18  1510 02/12/18 2002  02/13/18  0446   * 131* 131*   K 2.4* 2.4* 3.0*   CL 85* 95 97   CO2 32* 28 26   GLU 91 90 92   BUN 9 10 9   CREATININE 0.9 0.8 0.8   CALCIUM 7.8* 6.8* 7.2*   PROT 7.7  --  6.7   ALBUMIN 3.2*  --  2.8*   BILITOT 0.5  --  0.3   ALKPHOS 49*  --  41*   *  --  114*   ALT 16  --  16   ANIONGAP 10 8 8   EGFRNONAA >60 >60 >60   , Coagulation: No results for input(s): PT, INR, APTT in the last 48 hours., Haptoglobin: No results for input(s): HAPTOGLOBIN in the last 48 hours., LDH: No results for input(s): LDHCSF, BFSOURCE in the last 48 hours., Reticulocytes: No results for input(s): RETIC in the last 48 hours., Tumor Markers: No results for input(s): PSA, CEA, , AFPTM, [id number],  in the last 48 hours.    Invalid input(s): ALGTM, Uric Acid No results for input(s): URICACID in the last 48 hours. and Urine Studies:   Recent Labs  Lab 02/12/18  1408   COLORU Yellow   APPEARANCEUA Clear   PHUR 6.0   SPECGRAV 1.010   PROTEINUA 1+*   GLUCUA Negative   KETONESU Trace*   BILIRUBINUA Negative   OCCULTUA 2+*   NITRITE Negative   UROBILINOGEN Negative   LEUKOCYTESUR Negative   RBCUA 1   WBCUA 1   BACTERIA Rare   SQUAMEPITHEL 3   HYALINECASTS 5*     Diagnostic Results:    Single view Chest radiograph dated February 12, 2018    Clinical history:Chest pain    Comparison:No prior study    Findings:  The trachea and cardiomediastinal silhouette are within normal limits.  There is no evidence of pleural effusion or pneumothorax. Confluent opacity is identified in the left lower lobe suspicious for consolidation. Lungs otherwise clear.  Osseous structures demonstrate no evidence for acute fractures or dislocations.    Current Facility-Administered Medications   Medication Dose Route Frequency Provider Last Rate Last Dose    0.9%  NaCl infusion   Intravenous Continuous Shandra Shepherd  mL/hr at 02/13/18 0953      acetaminophen tablet 500 mg  500 mg Oral Q6H PRN Theodora Monson MD   500 mg at 02/13/18 7209     albuterol-ipratropium 2.5mg-0.5mg/3mL nebulizer solution 3 mL  3 mL Nebulization Q6H Kirk Vicente MD   3 mL at 02/13/18 1324    albuterol-ipratropium 2.5mg-0.5mg/3mL nebulizer solution 3 mL  3 mL Nebulization Q4H PRN Theodora Monson MD        azithromycin 500 mg in 0.9 % sodium chloride 250 mL IVPB (ready to mix system)  500 mg Intravenous Q24H Theodora Monson  mL/hr at 02/13/18 1701 500 mg at 02/13/18 1701    cefTRIAXone (ROCEPHIN) 2 g in dextrose 5 % 50 mL IVPB  2 g Intravenous Q24H Kirk Vicente MD   2 g at 02/13/18 1817    cloNIDine tablet 0.1 mg  0.1 mg Oral TID PRN Theodora Monson MD        ibuprofen tablet 400 mg  400 mg Oral Q8H PRN Sahndra Shepherd MD   400 mg at 02/13/18 1702    [START ON 2/14/2018] influenza (FLUZONE,FLUARIX QUADRIVALENT) vaccine 0.5 mL  0.5 mL Intramuscular vaccine x 1 dose Shandra Shepherd MD        ondansetron injection 8 mg  8 mg Intravenous Q8H PRN Theodora Monson MD        [START ON 2/14/2018] pneumoc 13-serafin conj-dip cr(PF) 0.5 mL  0.5 mL Intramuscular Once Shandra Shepherd MD        promethazine (PHENERGAN) 12.5 mg in dextrose 5 % 50 mL IVPB  12.5 mg Intravenous Q6H PRN Theodora Monson MD        ramelteon tablet 8 mg  8 mg Oral Nightly PRN Theodora Monson MD        senna-docusate 8.6-50 mg per tablet 1 tablet  1 tablet Oral BID PRN Theodora Monson MD             Assessment/Plan:     Active Diagnoses:    Diagnosis Date Noted POA    PRINCIPAL PROBLEM:  Community acquired bacterial pneumonia [J15.9] 02/12/2018 Yes    Sepsis [A41.9] 02/12/2018 Yes    Pancytopenia [D61.818] 02/12/2018 Yes    Hypokalemia [E87.6] 02/12/2018 Yes    Essential hypertension [I10] 02/12/2018 Yes     Chronic      Problems Resolved During this Admission:    Diagnosis Date Noted Date Resolved POA     Mr. Bolton, 58 YO man with possible CAP found to have pancytopenia. Low albumin, elevated AST     1. Pancytopenia: multifactorial including infection, malnutrition.    - worsening wbc-  ANC > 1000   - hold off on Granix   - hold off on transfusion   - check Iron panel, Vit B12 and folate   - check Uric acid and LDH   - check acute Hep panel    2. CAP: Rocephin and Zithromax       Thank you for your consult. I will follow-up with patient. Please contact us if you have any additional questions.    Anthony Ramachandran MD  Hematology/Oncology  [facility]

## 2018-02-13 NOTE — ASSESSMENT & PLAN NOTE
This admission is Mr. Bolton's first encounter within the system so, unfortunately, we have no previous labwork to which to compare.  Her differential is significant for a granulocytosis and occasional tear drops cells.  I do not think this is DIC given the absence of schistocytes on peripheral smear and I doubt TTP given his normal mentation and renal function, though the absence of these findings do not exclude those conditions. Rapid HIV is negative,acute hepatitis panel, and in the setting of his pneumonia, a PPD test.  consult Hematology for further recommendations.

## 2018-02-13 NOTE — SUBJECTIVE & OBJECTIVE
Past Medical History:   Diagnosis Date    Hypertension        History reviewed. No pertinent surgical history.    Review of patient's allergies indicates:  No Known Allergies    No current facility-administered medications on file prior to encounter.      No current outpatient prescriptions on file prior to encounter.     Family History     None        Social History Main Topics    Smoking status: Current Every Day Smoker     Packs/day: 1.00    Smokeless tobacco: Never Used    Alcohol use No    Drug use: No    Sexual activity: Not on file     Review of Systems   Constitutional: Positive for activity change, appetite change, fatigue and fever. Negative for chills, diaphoresis and unexpected weight change.   HENT: Negative.    Eyes: Negative.    Respiratory: Negative for cough, chest tightness, shortness of breath and wheezing.    Cardiovascular: Negative for chest pain, palpitations and leg swelling.   Gastrointestinal: Negative for abdominal distention, abdominal pain, blood in stool, constipation, diarrhea, nausea and vomiting.   Genitourinary: Negative for dysuria.   Musculoskeletal: Negative.    Skin: Negative.    Neurological: Positive for weakness. Negative for dizziness, seizures, syncope and light-headedness.   Psychiatric/Behavioral: Negative.      Objective:     Vital Signs (Most Recent):  Temp: (!) 101.2 °F (38.4 °C) (02/13/18 0820)  Pulse: 99 (02/13/18 0820)  Resp: (!) 22 (02/13/18 0820)  BP: (!) 90/54 (02/13/18 0820)  SpO2: 96 % (02/13/18 0820) Vital Signs (24h Range):  Temp:  [99.3 °F (37.4 °C)-102.8 °F (39.3 °C)] 101.2 °F (38.4 °C)  Pulse:  [] 99  Resp:  [17-22] 22  SpO2:  [84 %-100 %] 96 %  BP: ()/(53-74) 90/54     Weight: 101.3 kg (223 lb 5.2 oz)  Body mass index is 34.98 kg/m².    Physical Exam   Constitutional: He is oriented to person, place, and time. He appears well-developed and well-nourished. No distress.   obese   HENT:   Head: Normocephalic and atraumatic.   Right Ear:  External ear normal.   Left Ear: External ear normal.   Nose: Nose normal.   Eyes: Right eye exhibits no discharge. Left eye exhibits no discharge.   Neck: Normal range of motion.   Cardiovascular: Normal rate, regular rhythm, normal heart sounds and intact distal pulses.  Exam reveals no gallop and no friction rub.    No murmur heard.  Pulmonary/Chest: Effort normal and breath sounds normal. No respiratory distress. He has no wheezes. He has no rales. He exhibits no tenderness.   Abdominal: Soft. Bowel sounds are normal. He exhibits no distension. There is no tenderness. There is no rebound and no guarding.   Musculoskeletal: Normal range of motion. He exhibits no edema.   Neurological: He is alert and oriented to person, place, and time.   Skin: Skin is warm and dry. He is not diaphoretic. No erythema.   Psychiatric: He has a normal mood and affect. His behavior is normal. Judgment and thought content normal.   Nursing note and vitals reviewed.          Significant Labs: All pertinent labs within the past 24 hours have been reviewed.    Significant Imaging: I have reviewed and interpreted all pertinent imaging results/findings within the past 24 hours.

## 2018-02-13 NOTE — NURSING
Bedside report received from off going nurse. Pt aa oriented to self. No noted distress, no complaints. Safety precautions in place.

## 2018-02-13 NOTE — NURSING
02/13/18 1149 02/13/18 1324 02/13/18 1507   Vital Signs   Temp (!) 103 °F (39.4 °C) --  (!) 101.7 °F (38.7 °C)   Temp src Oral --  --    Pulse 109 81 --    Resp 18 19 --    SpO2 (!) 89 % 95 % --    Flow (L/min) --  2 --    O2 Device (Oxygen Therapy) room air nasal cannula --    /66 --  --    MAP (mmHg) 88 --  --        02/13/18 1639   Vital Signs   Temp (!) 103.8 °F (39.9 °C)   Temp src Oral   Pulse 108   Resp --    SpO2 (!) 91 %   Flow (L/min) 2   O2 Device (Oxygen Therapy) nasal cannula   BP (!) 129/59   MAP (mmHg) --      Patient remains awake, alert, oriented. Denies pain/discomfort.  Ice packs to axillary, cooling blanket applied. Dr. Espinal notified. New orders received and acknowledged for Ibuprofen PO.

## 2018-02-13 NOTE — HPI
"Mr. Dwight Bolton is a 57 y.o. male with essential hypertension who presents to [facility] ED with complaints of fall today.  He has been feeling weak "for a while" and is always short of breath but says that he came here because he had a fall today.  He denies any loss of consciousness, lightheadedness, nor did he sustain any head trauma.  He denies any antecedent chest pain or palpitations, but he does say that he is always short of breath and it's not any worse than usual.  He denies any fever, chills, nausea, vomiting, nor headaches.  He decided to come here when he couldn't stand back up.  He is otherwise a very poor historian.  "

## 2018-02-13 NOTE — PLAN OF CARE
02/13/18 0937   Discharge Assessment   Assessment Type Discharge Planning Assessment   Assessment information obtained from? Medical Record;Patient   Prior to hospitilization cognitive status: Unable to Assess   Lives With other (see comments)   Able to Return to Prior Arrangements unable to determine at this time (comments)   Is patient able to care for self after discharge? Unable to determine at this time (comments)   Patient's perception of discharge disposition shelter   Readmission Within The Last 30 Days no previous admission in last 30 days   Do you have any problems affording any of your prescribed medications? TBD   Is the patient taking medications as prescribed? no   Does the patient have transportation home? No   Does the patient receive services at the [facility]? No   Discharge Plan A Shelter   Patient/Family In Agreement With Plan unable to assess   Does the patient have transportation to healthcare appointments? No

## 2018-02-13 NOTE — HOSPITAL COURSE
Mr. Bolton presented with severe sepsis likely secondary to pneumonia. CXR showed LLL consolidation. Given no recent hospitalization for previous 3 months, he was initiated on ceftriaxone and azithromycin, plus supplemental O2. Workup significant for Hep C and pancytopenia with neutropenia. Retic count low, therefore Hem/Onc consulted for co-management. ID also consulted for abx co-management given immunocompromised state. Patient continued to decline despite antimicrobial regimen and supplemental O2. Placed on BiPAP. Transferred to the ICU on 2/17 for continued respiratory decompensation on BIPAP. On 2/18, required emergent intubation and very high O2 requirements. A rectal tube was placed and blood seen post placement. Transfused a total of 4U of PRBCs and 2 doses of platelets for acute blood loss anemia in setting of thrombocytopenia and GI bleed. GI consulted. Recommended conservative management. GI bleed resolved with platelet transfusion. Pancytopenia/neutropenia co-managed by hematology. Provided vit B12, folic acid and filgrastim with eventual resolution of pancytopenia/neutropenia. Diuresed without improvement. Resp Cx and BCx from 2/17 negative. A CT of chest showed multiple areas of consolidation in the right upper lobes as well as the lower lobes bilateral concerning for multifocal pneumonia. Abx broadened to meropenem and vancomycin. Fluconazole also added in case fungal infection. Renal function declined significantly until failure. Nephrology consulted. Deemed not a candidate for dialysis. Persistently with maximal vent support (FiO2 60-70%, high PEEP) since intubation (2/18). Met with mother, daughter and two sons. Discussed poor prognosis despite life support. Agreed with DNR. Palliative care consulted to discuss withdrawal of life saving interventions. Family meeting 2/28 with terminal extubation ~1400. Comfort measures in place. Time of death 1548 on 2/28/2018 from respiratory failure from severe  sepsis 2/2 pneumonia and renal insufficiency.

## 2018-02-13 NOTE — ASSESSMENT & PLAN NOTE
Given that his calcium is also low, I suspect hypomagnesemia.  Will obtain a magnesium level and replete his potassium orally; will recheck his potassium level in the morning.

## 2018-02-13 NOTE — ASSESSMENT & PLAN NOTE
This patient meets criteria for sepsis given fever, tachycardia, leukopenia, and pneumonia; there is no evidence of end-organ dysfunction.  Blood and urine cultures are pending; will start IV fluid hydration and broad spectrum antibiotics.

## 2018-02-13 NOTE — ASSESSMENT & PLAN NOTE
This admission is Mr. Bolton's first encounter within the system so, unfortunately, we have no previous labwork to which to compare.  Her differential is significant for a granulocytosis and occasional tear drops cells.  I do not think this is DIC given the absence of schistocytes on peripheral smear and I doubt TTP given his normal mentation and renal function, though the absence of these findings do not exclude those conditions.  Will obtain an HIV, acute hepatitis panel, and in the setting of his pneumonia, a PPD test.  Will also consult Hematology for further recommendations.

## 2018-02-14 PROBLEM — J18.9 PNEUMONIA OF LEFT LOWER LOBE DUE TO INFECTIOUS ORGANISM: Status: ACTIVE | Noted: 2018-01-01

## 2018-02-14 NOTE — NURSING
Bedside rounding report received from iker you on patient progress and updated handoff report sheet received too. Assessment completed and plan of care discussed with patient. No pain voiced.

## 2018-02-14 NOTE — NURSING
0945- patient ate his breakfast meal messy with food and likes to eat with his hands and fingers instead of flat wear behavior is ritualistic and speech as well . Also smacks his lips together too repeatedly . Denies pain when asked and will make needs known no change on telemetry afebrile at this time. Call light in reach and head of bed elevated side rails up x 3.     1230- patient ate his lunch meal tolerated well. Again food removed from bed. Denies pain . Continue to monitor.       1430- no acute distress or changes on monitor. Droplet precautions remain and social interaction provided during adl care. Continue to monitor

## 2018-02-14 NOTE — ASSESSMENT & PLAN NOTE
This admission is Mr. Bolton's first encounter within the system so, unfortunately, we have no previous labwork to which to compare.  Her differential is significant for a granulocytosis and occasional tear drops cells.  I do not think this is DIC given the absence of schistocytes on peripheral smear and I doubt TTP given his normal mentation and renal function, though the absence of these findings do not exclude those conditions. Rapid HIV is negative,acute hepatitis panel, and in the setting of his pneumonia, a PPD test.  consult Hematology for further recommendations.worsening ,per hematology may duo infections,hepatitis [kota is pending.

## 2018-02-14 NOTE — PROGRESS NOTES
[facility]  Hematology/Oncology  Progress Note    Patient Name: Dwight Bolton  Admission Date: 2/12/2018  Hospital Length of Stay: 2 days  Code Status: Full Code     Subjective:     Interval History:     02/14/2018: states feeling better. Fever improving        Oncology Treatment Plan:/   [No treatment plan]    Medications:  Continuous Infusions:  Scheduled Meds:   albuterol-ipratropium 2.5mg-0.5mg/3mL  3 mL Nebulization Q6H    azithromycin  500 mg Intravenous Q24H    cefTRIAXone (ROCEPHIN) IVPB  2 g Intravenous Q24H    furosemide  40 mg Oral Daily    pneumoc 13-serafin conj-dip cr(PF)  0.5 mL Intramuscular Once     PRN Meds:acetaminophen, albuterol-ipratropium 2.5mg-0.5mg/3mL, cloNIDine, ibuprofen, influenza, ondansetron, promethazine (PHENERGAN) IVPB, ramelteon, senna-docusate 8.6-50 mg     Review of Systems     Constitutional: + fatigue   Eyes: no visual changes   ENT: no nasal congestion. Denies sore throat with odynophagia   Respiratory: cough and congestion   Cardiovascular: no chest pain or palpitations   Gastrointestinal: no nausea, vomiting or abdominal pain. Normal bowl habits   Hematologic/Lymphatic:   See HPI  Musculoskeletal: muscle weakness  Neurological: no seizures or tremors, gait or balance prblems  Skin: No rashes or lesions  Psych: Denies any anxiety, depression or insomnia    Objective:     Vital Signs (Most Recent):  Temp: 99.1 °F (37.3 °C) (02/14/18 1125)  Pulse: 99 (02/14/18 1125)  Resp: 20 (02/14/18 1125)  BP: 121/67 (02/14/18 1125)  SpO2: 97 % (02/14/18 1125) Vital Signs (24h Range):  Temp:  [97.7 °F (36.5 °C)-103.8 °F (39.9 °C)] 99.1 °F (37.3 °C)  Pulse:  [] 99  Resp:  [16-20] 20  SpO2:  [91 %-97 %] 97 %  BP: ()/(56-74) 121/67     Weight: 101.3 kg (223 lb 5.2 oz)  Body mass index is 34.98 kg/m².  Body surface area is 2.19 meters squared.      Intake/Output Summary (Last 24 hours) at 02/14/18 1251  Last data filed at 02/14/18 0037   Gross per 24 hour   Intake           3984.58 ml   Output                0 ml   Net          3984.58 ml       Physical Exam    General: NAD. Resting in bed    Head: normocephalic, atraumatic   Eyes: conjunctivae pale    Throat: No erythema or post nasal discharge   Neck: supple, no LAD   Lungs: rhonchi and crackles bilaterally, left > R  Heart: S1, S2, tachycardic   Abdomen: + BS x 4 quadrants, soft, non tender.   Extremities: no cyanosis or edema.   Skin: turgor normal, no erythema or rashes.  MS: move all extremities  Neuro: A&O x 2  Psy: disheveled     Significant Labs:   CBC:   Recent Labs  Lab 02/12/18 1510 02/13/18 0446 02/14/18  0431   WBC 2.49* 1.53* 0.84*   HGB 8.9* 7.9* 7.5*   HCT 26.4* 24.0* 22.7*   PLT 79* 74* 45*   , CMP:   Recent Labs  Lab 02/12/18  1510 02/12/18 2002 02/13/18 0446 02/14/18  0431   * 131* 131* 130*   K 2.4* 2.4* 3.0* 3.4*   CL 85* 95 97 102   CO2 32* 28 26 24   GLU 91 90 92 97   BUN 9 10 9 9   CREATININE 0.9 0.8 0.8 0.9   CALCIUM 7.8* 6.8* 7.2* 7.2*   PROT 7.7  --  6.7 6.3   ALBUMIN 3.2*  --  2.8* 2.6*   BILITOT 0.5  --  0.3 0.2   ALKPHOS 49*  --  41* 43*   *  --  114* 111*   ALT 16  --  16 15   ANIONGAP 10 8 8 4*   EGFRNONAA >60 >60 >60 >60   , Coagulation: No results for input(s): PT, INR, APTT in the last 48 hours., Haptoglobin: No results for input(s): HAPTOGLOBIN in the last 48 hours., Immunology: No results for input(s): SPEP, MINAL, LUCIEN, FREELAMBDALI in the last 48 hours., LDH: No results for input(s): LDHCSF, BFSOURCE in the last 48 hours., Reticulocytes:   Recent Labs  Lab 02/13/18  0446   RETIC 0.4   , Tumor Markers: No results for input(s): PSA, CEA, , AFPTM, JT9334,  in the last 48 hours.    Invalid input(s): ALGTM, Uric Acid   Recent Labs  Lab 02/13/18  0446   URICACID 5.8    and Urine Studies:   Recent Labs  Lab 02/12/18  1408   COLORU Yellow   APPEARANCEUA Clear   PHUR 6.0   SPECGRAV 1.010   PROTEINUA 1+*   GLUCUA Negative   KETONESU Trace*   BILIRUBINUA Negative   OCCULTUA  2+*   NITRITE Negative   UROBILINOGEN Negative   LEUKOCYTESUR Negative   RBCUA 1   WBCUA 1   BACTERIA Rare   SQUAMEPITHEL 3   HYALINECASTS 5*       Diagnostic Results:      Assessment/Plan:     Active Diagnoses:    Diagnosis Date Noted POA    PRINCIPAL PROBLEM:  Community acquired bacterial pneumonia [J15.9] 02/12/2018 Yes    Pneumonia of left lower lobe due to infectious organism [J18.1] 02/14/2018 Yes    Sepsis [A41.9] 02/12/2018 Yes    Pancytopenia [D61.818] 02/12/2018 Yes    Hypokalemia [E87.6] 02/12/2018 Yes    Essential hypertension [I10] 02/12/2018 Yes     Chronic      Problems Resolved During this Admission:    Diagnosis Date Noted Date Resolved POA       Mr. Bolton, 56 YO man with possible CAP found to have pancytopenia. Low albumin, elevated AST      1. Pancytopenia: multifactorial including infection, malnutrition.               - worsening wbc- ANC    - Neutropenic isolation              - Granix 300 mcg daily until ANC > 1500              - may need prbc transfusion if Hg < 7g              - check Iron panel, Vit B12 and folate              - elevated  LDH              - Hep C +   - pt has inappropriately low retic- pt has underlying bone marrow pathology   - may need BM bx- can be done out pt once acute infectious issues resolves   - also Hep C causing cytopenia- especially low platelet        2. CAP: Rocephin and Zithromax    - recommend to upgrade ABX coverage: Vanc & Cefepime     Discussed with Dr. Teddy Mathew will be covering starting tomorrow     Anthony Ramachandran M.D  Internal Medicine & Geriatric Medicine  Hematology & Oncology  Palliative Medicine    [address], [address]  [place] [address] [phone] (Office)  [phone] (Fax)

## 2018-02-14 NOTE — PROGRESS NOTES
"[facility] Hospital Medicine  Progress Note    Patient Name: Dwight Bolton  MRN: [id number]  Patient Class: IP- Inpatient   Admission Date: 2/12/2018  Length of Stay: 2 days  Attending Physician: Shandra Shepherd MD  Primary Care Provider: Primary Doctor No        Subjective:     Principal Problem:Community acquired bacterial pneumonia    HPI:  Mr. Dwight Bolton is a 57 y.o. male with essential hypertension who presents to [facility] ED with complaints of fall today.  He has been feeling weak "for a while" and is always short of breath but says that he came here because he had a fall today.  He denies any loss of consciousness, lightheadedness, nor did he sustain any head trauma.  He denies any antecedent chest pain or palpitations, but he does say that he is always short of breath and it's not any worse than usual.  He denies any fever, chills, nausea, vomiting, nor headaches.  He decided to come here when he couldn't stand back up.  He is otherwise a very poor historian.    Hospital Course:  Mr. Dwight Bolton is a 57 y.o. male with essential hypertension who presents to [facility] ED with complaints of fall .  He has been feeling weak "for a while" and is always short of breath but says that he came here because he had a fall .  He denies any loss of consciousness, lightheadedness, nor did he sustain any head trauma.  He denies any antecedent chest pain or palpitations, but he does say that he is always short of breath and it's not any worse than usual.  He denies any fever, chills, nausea, vomiting, nor headaches.  He decided to come here when he couldn't stand back up.  He is otherwise a very poor historian.chest x ray  show LLL pneumonia,patient has been started on IV Abx,he is septic with caitlin and leucocytosis,also pancytopenic HIV is negative and hepatitis panel is pending.  Ordered all TB panels.with AFB culture,smear,gram stain (negative),goldenferon,also PPD has been placed.his fever is " improved,but pancytopenia is worsening,no major growths in cultures.    Past Medical History:   Diagnosis Date    Hypertension        History reviewed. No pertinent surgical history.    Review of patient's allergies indicates:  No Known Allergies    No current facility-administered medications on file prior to encounter.      No current outpatient prescriptions on file prior to encounter.     Family History     None        Social History Main Topics    Smoking status: Current Every Day Smoker     Packs/day: 1.00    Smokeless tobacco: Never Used    Alcohol use No    Drug use: No    Sexual activity: Not on file     Review of Systems   Constitutional: Positive for activity change, appetite change, fatigue and fever. Negative for chills, diaphoresis and unexpected weight change.   HENT: Negative.    Eyes: Negative.    Respiratory: Negative for cough, chest tightness, shortness of breath and wheezing.    Cardiovascular: Negative for chest pain, palpitations and leg swelling.   Gastrointestinal: Negative for abdominal distention, abdominal pain, blood in stool, constipation, diarrhea, nausea and vomiting.   Genitourinary: Negative for dysuria.   Musculoskeletal: Negative.    Skin: Negative.    Neurological: Positive for weakness. Negative for dizziness, seizures, syncope and light-headedness.   Psychiatric/Behavioral: Negative.      Objective:     Vital Signs (Most Recent):  Temp: 97.7 °F (36.5 °C) (02/14/18 0750)  Pulse: 91 (02/14/18 0750)  Resp: 20 (02/14/18 0750)  BP: 114/69 (02/14/18 0750)  SpO2: 96 % (02/14/18 0750) Vital Signs (24h Range):  Temp:  [97.7 °F (36.5 °C)-103.8 °F (39.9 °C)] 97.7 °F (36.5 °C)  Pulse:  [] 91  Resp:  [16-20] 20  SpO2:  [89 %-96 %] 96 %  BP: ()/(56-74) 114/69     Weight: 101.3 kg (223 lb 5.2 oz)  Body mass index is 34.98 kg/m².    Physical Exam   Constitutional: He is oriented to person, place, and time. He appears well-developed and well-nourished. No distress.   obese    HENT:   Head: Normocephalic and atraumatic.   Right Ear: External ear normal.   Left Ear: External ear normal.   Nose: Nose normal.   Eyes: Right eye exhibits no discharge. Left eye exhibits no discharge.   Neck: Normal range of motion.   Cardiovascular: Normal rate, regular rhythm, normal heart sounds and intact distal pulses.  Exam reveals no gallop and no friction rub.    No murmur heard.  Pulmonary/Chest: Effort normal and breath sounds normal. No respiratory distress. He has no wheezes. He has no rales. He exhibits no tenderness.   Abdominal: Soft. Bowel sounds are normal. He exhibits no distension. There is no tenderness. There is no rebound and no guarding.   Musculoskeletal: Normal range of motion. He exhibits no edema.   Neurological: He is alert and oriented to person, place, and time.   Skin: Skin is warm and dry. He is not diaphoretic. No erythema.   Psychiatric: He has a normal mood and affect. His behavior is normal. Judgment and thought content normal.   Nursing note and vitals reviewed.          Significant Labs: All pertinent labs within the past 24 hours have been reviewed.    Significant Imaging: I have reviewed and interpreted all pertinent imaging results/findings within the past 24 hours.    Assessment/Plan:      * Community acquired bacterial pneumonia    The patient has a CURB-65 score of 0 and does not meet criteria for healthcare-associated pneumonia.  I have reviewed the chest X-ray and it reveals a left lower lobe infiltrate that is suspicious for pneumonia.  Oxygen saturations are stable.  Blood and sputum cultures are pending. On IV antibiotic therapy.Ordered all TB panels.with AFB culture,smear,gram stain,goldenferon,also PPD has been placed.        Pneumonia of left lower lobe due to infectious organism    On broad spectrum IV Abx.          Essential hypertension    His blood pressure is well-controlled and he is not on medications; will provide as-needed clonidine.         Hypokalemia    Given that his calcium is also low, I suspect hypomagnesemia.  Will obtain a magnesium level and replete his potassium orally; will recheck his potassium ,reoplace as needed.        Pancytopenia    This admission is Mr. Bolton's first encounter within the system so, unfortunately, we have no previous labwork to which to compare.  Her differential is significant for a granulocytosis and occasional tear drops cells.  I do not think this is DIC given the absence of schistocytes on peripheral smear and I doubt TTP given his normal mentation and renal function, though the absence of these findings do not exclude those conditions. Rapid HIV is negative,acute hepatitis panel, and in the setting of his pneumonia, a PPD test.  consult Hematology for further recommendations.worsening ,per hematology may duo infections,hepatitis [kota is pending.        Sepsis    This patient meets criteria for sepsis given fever, tachycardia, leukopenia, and pneumonia; there is no evidence of end-organ dysfunction.  Blood and urine cultures are pending;on  IV fluid hydration and broad spectrum antibiotics.          VTE Risk Mitigation         Ordered     Medium Risk of VTE  Once      02/12/18 4539              Shandra Shepherd MD  Department of Hospital Medicine   [facility]

## 2018-02-14 NOTE — SUBJECTIVE & OBJECTIVE
Past Medical History:   Diagnosis Date    Hypertension        History reviewed. No pertinent surgical history.    Review of patient's allergies indicates:  No Known Allergies    No current facility-administered medications on file prior to encounter.      No current outpatient prescriptions on file prior to encounter.     Family History     None        Social History Main Topics    Smoking status: Current Every Day Smoker     Packs/day: 1.00    Smokeless tobacco: Never Used    Alcohol use No    Drug use: No    Sexual activity: Not on file     Review of Systems   Constitutional: Positive for activity change, appetite change, fatigue and fever. Negative for chills, diaphoresis and unexpected weight change.   HENT: Negative.    Eyes: Negative.    Respiratory: Negative for cough, chest tightness, shortness of breath and wheezing.    Cardiovascular: Negative for chest pain, palpitations and leg swelling.   Gastrointestinal: Negative for abdominal distention, abdominal pain, blood in stool, constipation, diarrhea, nausea and vomiting.   Genitourinary: Negative for dysuria.   Musculoskeletal: Negative.    Skin: Negative.    Neurological: Positive for weakness. Negative for dizziness, seizures, syncope and light-headedness.   Psychiatric/Behavioral: Negative.      Objective:     Vital Signs (Most Recent):  Temp: 97.7 °F (36.5 °C) (02/14/18 0750)  Pulse: 91 (02/14/18 0750)  Resp: 20 (02/14/18 0750)  BP: 114/69 (02/14/18 0750)  SpO2: 96 % (02/14/18 0750) Vital Signs (24h Range):  Temp:  [97.7 °F (36.5 °C)-103.8 °F (39.9 °C)] 97.7 °F (36.5 °C)  Pulse:  [] 91  Resp:  [16-20] 20  SpO2:  [89 %-96 %] 96 %  BP: ()/(56-74) 114/69     Weight: 101.3 kg (223 lb 5.2 oz)  Body mass index is 34.98 kg/m².    Physical Exam   Constitutional: He is oriented to person, place, and time. He appears well-developed and well-nourished. No distress.   obese   HENT:   Head: Normocephalic and atraumatic.   Right Ear: External ear  normal.   Left Ear: External ear normal.   Nose: Nose normal.   Eyes: Right eye exhibits no discharge. Left eye exhibits no discharge.   Neck: Normal range of motion.   Cardiovascular: Normal rate, regular rhythm, normal heart sounds and intact distal pulses.  Exam reveals no gallop and no friction rub.    No murmur heard.  Pulmonary/Chest: Effort normal and breath sounds normal. No respiratory distress. He has no wheezes. He has no rales. He exhibits no tenderness.   Abdominal: Soft. Bowel sounds are normal. He exhibits no distension. There is no tenderness. There is no rebound and no guarding.   Musculoskeletal: Normal range of motion. He exhibits no edema.   Neurological: He is alert and oriented to person, place, and time.   Skin: Skin is warm and dry. He is not diaphoretic. No erythema.   Psychiatric: He has a normal mood and affect. His behavior is normal. Judgment and thought content normal.   Nursing note and vitals reviewed.          Significant Labs: All pertinent labs within the past 24 hours have been reviewed.    Significant Imaging: I have reviewed and interpreted all pertinent imaging results/findings within the past 24 hours.

## 2018-02-15 PROBLEM — B18.2 CHRONIC HEPATITIS C VIRUS INFECTION: Status: ACTIVE | Noted: 2018-01-01

## 2018-02-15 NOTE — NURSING
Rounding report this am received from iker you rn on patients progress and updated handoff report sheet received from her too. Denies pain when asked patient quiet in bed afebrile at this time. No needs voiced . Assessment completed and plan today reviewed with patient he did not agree or disagree did not respond. No acute distress iv site clean dry and intact.

## 2018-02-15 NOTE — NURSING
1730- vancomycin infusing first . Patient denies pain does have chills but afebrile tolerating iv fluids . No change on telemetry scoping sinus tachycardia on monitor. Droplet precautions remain in effect.     1930-rounding report given to iker you rn on patients progress and updated handoff report sheet given to her too. No fever this shift or adverse side effects to antibiotic therapy . Call light in reach and head of bed elevated side rails up x 3 bed alarm on. Droplet precautions maintained.

## 2018-02-15 NOTE — ASSESSMENT & PLAN NOTE
This patient meets criteria for sepsis given fever, tachycardia, leukopenia, and pneumonia; there is no evidence of end-organ dysfunction.  Blood and urine cultures are pending;on  broad spectrum antibiotics.

## 2018-02-15 NOTE — PROGRESS NOTES
"[facility] Hospital Medicine  Progress Note    Patient Name: Dwight Bolton  MRN: [id number]  Patient Class: IP- Inpatient   Admission Date: 2/12/2018  Length of Stay: 3 days  Attending Physician: Shandra Shepherd MD  Primary Care Provider: Primary Doctor No        Subjective:     Principal Problem:Community acquired bacterial pneumonia    HPI:  Mr. Dwight Bolton is a 57 y.o. male with essential hypertension who presents to [facility] ED with complaints of fall today.  He has been feeling weak "for a while" and is always short of breath but says that he came here because he had a fall today.  He denies any loss of consciousness, lightheadedness, nor did he sustain any head trauma.  He denies any antecedent chest pain or palpitations, but he does say that he is always short of breath and it's not any worse than usual.  He denies any fever, chills, nausea, vomiting, nor headaches.  He decided to come here when he couldn't stand back up.  He is otherwise a very poor historian.    Hospital Course:  Mr. Dwight Bolton is a 57 y.o. male with essential hypertension who presents to [facility] ED with complaints of fall .  He has been feeling weak "for a while" and is always short of breath but says that he came here because he had a fall .  He denies any loss of consciousness, lightheadedness, nor did he sustain any head trauma.  He denies any antecedent chest pain or palpitations, but he does say that he is always short of breath and it's not any worse than usual.  He denies any fever, chills, nausea, vomiting, nor headaches.  He decided to come here when he couldn't stand back up.  He is otherwise a very poor historian.chest x ray  show LLL pneumonia,patient has been started on IV Abx,he is septic with fever and leucocytopenia ,also pancytopenic HIV is negative and hepatitis panel is positive for Hep. C.  Ordered all TB panels.with AFB culture,smear,gram stain (negative),goldenferon,also PPD has been placed.his " fever is improved,but pancytopenia was worsening,no major growths in cultures.hematology started on Ganix with improvement.    Past Medical History:   Diagnosis Date    Hypertension        History reviewed. No pertinent surgical history.    Review of patient's allergies indicates:  No Known Allergies    No current facility-administered medications on file prior to encounter.      No current outpatient prescriptions on file prior to encounter.     Family History     None        Social History Main Topics    Smoking status: Current Every Day Smoker     Packs/day: 1.00    Smokeless tobacco: Never Used    Alcohol use No    Drug use: No    Sexual activity: Not on file     Review of Systems   Constitutional: Positive for activity change, appetite change, fatigue and fever. Negative for chills, diaphoresis and unexpected weight change.   HENT: Negative.    Eyes: Negative.    Respiratory: Negative for cough, chest tightness, shortness of breath and wheezing.    Cardiovascular: Negative for chest pain, palpitations and leg swelling.   Gastrointestinal: Negative for abdominal distention, abdominal pain, blood in stool, constipation, diarrhea, nausea and vomiting.   Genitourinary: Negative for dysuria.   Musculoskeletal: Negative.    Skin: Negative.    Neurological: Positive for weakness. Negative for dizziness, seizures, syncope and light-headedness.   Psychiatric/Behavioral: Negative.      Objective:     Vital Signs (Most Recent):  Temp: 98.5 °F (36.9 °C) (02/15/18 0740)  Pulse: 102 (Simultaneous filing. User may not have seen previous data.) (02/15/18 0740)  Resp: (!) 22 (Simultaneous filing. User may not have seen previous data.) (02/15/18 0740)  BP: 105/72 (02/15/18 0740)  SpO2: (!) 91 % (Simultaneous filing. User may not have seen previous data.) (02/15/18 0740) Vital Signs (24h Range):  Temp:  [98.5 °F (36.9 °C)-104.5 °F (40.3 °C)] 98.5 °F (36.9 °C)  Pulse:  [] 102  Resp:  [18-28] 22  SpO2:  [90 %-98 %] 91  %  BP: ()/(52-72) 105/72     Weight: 101.3 kg (223 lb 5.2 oz)  Body mass index is 34.98 kg/m².    Physical Exam   Constitutional: He is oriented to person, place, and time. He appears well-developed and well-nourished. No distress.   obese   HENT:   Head: Normocephalic and atraumatic.   Right Ear: External ear normal.   Left Ear: External ear normal.   Nose: Nose normal.   Eyes: Right eye exhibits no discharge. Left eye exhibits no discharge.   Neck: Normal range of motion.   Cardiovascular: Normal rate, regular rhythm, normal heart sounds and intact distal pulses.  Exam reveals no gallop and no friction rub.    No murmur heard.  Pulmonary/Chest: Effort normal and breath sounds normal. No respiratory distress. He has no wheezes. He has no rales. He exhibits no tenderness.   Abdominal: Soft. Bowel sounds are normal. He exhibits no distension. There is no tenderness. There is no rebound and no guarding.   Musculoskeletal: Normal range of motion. He exhibits no edema.   Neurological: He is alert and oriented to person, place, and time.   Skin: Skin is warm and dry. He is not diaphoretic. No erythema.   Psychiatric: He has a normal mood and affect. His behavior is normal. Judgment and thought content normal.   Nursing note and vitals reviewed.          Significant Labs: All pertinent labs within the past 24 hours have been reviewed.    Significant Imaging: I have reviewed and interpreted all pertinent imaging results/findings within the past 24 hours.    Assessment/Plan:      * Community acquired bacterial pneumonia    The patient has a CURB-65 score of 0 and does not meet criteria for healthcare-associated pneumonia.  I have reviewed the chest X-ray and it reveals a left lower lobe infiltrate that is suspicious for pneumonia.  Oxygen saturations are stable. On NC O 2,  Blood and sputum cultures are pending. On IV antibiotic therapy.has been broadened to vanc and cefepime,Ordered all TB panels.with AFB  culture,smear,gram stain (negative),goldenferon,also PPD has been placed.        Chronic hepatitis C virus infection    Will monitor with LFT,check ammonia level.he knows he has liver cirrhosis.        Pneumonia of left lower lobe due to infectious organism    On broad spectrum IV Abx.changed to vanc and cefepime,.          Essential hypertension    His blood pressure is well-controlled and he is not on medications; will provide as-needed clonidine.        Hypokalemia    Given that his calcium is also low, I suspect hypomagnesemia.  Will obtain a magnesium level and replete his potassium orally; will recheck his potassium ,reoplace as needed.        Pancytopenia    This admission is Mr. Bolton's first encounter within the system so, unfortunately, we have no previous labwork to which to compare.  Her differential is significant for a granulocytosis and occasional tear drops cells.  I do not think this is DIC given the absence of schistocytes on peripheral smear and I doubt TTP given his normal mentation and renal function, though the absence of these findings do not exclude those conditions. Rapid HIV is negative, and in the setting of his pneumonia, a PPD test.  consult Hematology for further recommendations.,was worsening ,per hematology may duo infections,hepatitis c is positive,has been started on Granix with improvement.        Sepsis    This patient meets criteria for sepsis given fever, tachycardia, leukopenia, and pneumonia; there is no evidence of end-organ dysfunction.  Blood and urine cultures are pending;on  broad spectrum antibiotics.          VTE Risk Mitigation         Ordered     Medium Risk of VTE  Once      02/12/18 1088              Shandra Shepherd MD  Department of Hospital Medicine   [facility]

## 2018-02-15 NOTE — ASSESSMENT & PLAN NOTE
This admission is Mr. Bolton's first encounter within the system so, unfortunately, we have no previous labwork to which to compare.  Her differential is significant for a granulocytosis and occasional tear drops cells.  I do not think this is DIC given the absence of schistocytes on peripheral smear and I doubt TTP given his normal mentation and renal function, though the absence of these findings do not exclude those conditions. Rapid HIV is negative, and in the setting of his pneumonia, a PPD test.  consult Hematology for further recommendations.,was worsening ,per hematology may duo infections,hepatitis c is positive,has been started on Granix with improvement.

## 2018-02-15 NOTE — NURSING
"Patient reports he didn't eat his breakfast this morning states ,"the young made me feel sick." encouraged patient to drink water keep hydrated iv antibiotic infusing primary fluid discontinued. Patient denies pain at this time. No acute change .   "

## 2018-02-15 NOTE — ASSESSMENT & PLAN NOTE
The patient has a CURB-65 score of 0 and does not meet criteria for healthcare-associated pneumonia.  I have reviewed the chest X-ray and it reveals a left lower lobe infiltrate that is suspicious for pneumonia.  Oxygen saturations are stable. On NC O 2,  Blood and sputum cultures are pending. On IV antibiotic therapy.has been broadened to vanc and cefepime,Ordered all TB panels.with AFB culture,smear,gram stain (negative),goldenferon,also PPD has been placed.

## 2018-02-15 NOTE — SUBJECTIVE & OBJECTIVE
Past Medical History:   Diagnosis Date    Hypertension        History reviewed. No pertinent surgical history.    Review of patient's allergies indicates:  No Known Allergies    No current facility-administered medications on file prior to encounter.      No current outpatient prescriptions on file prior to encounter.     Family History     None        Social History Main Topics    Smoking status: Current Every Day Smoker     Packs/day: 1.00    Smokeless tobacco: Never Used    Alcohol use No    Drug use: No    Sexual activity: Not on file     Review of Systems   Constitutional: Positive for activity change, appetite change, fatigue and fever. Negative for chills, diaphoresis and unexpected weight change.   HENT: Negative.    Eyes: Negative.    Respiratory: Negative for cough, chest tightness, shortness of breath and wheezing.    Cardiovascular: Negative for chest pain, palpitations and leg swelling.   Gastrointestinal: Negative for abdominal distention, abdominal pain, blood in stool, constipation, diarrhea, nausea and vomiting.   Genitourinary: Negative for dysuria.   Musculoskeletal: Negative.    Skin: Negative.    Neurological: Positive for weakness. Negative for dizziness, seizures, syncope and light-headedness.   Psychiatric/Behavioral: Negative.      Objective:     Vital Signs (Most Recent):  Temp: 98.5 °F (36.9 °C) (02/15/18 0740)  Pulse: 102 (Simultaneous filing. User may not have seen previous data.) (02/15/18 0740)  Resp: (!) 22 (Simultaneous filing. User may not have seen previous data.) (02/15/18 0740)  BP: 105/72 (02/15/18 0740)  SpO2: (!) 91 % (Simultaneous filing. User may not have seen previous data.) (02/15/18 0740) Vital Signs (24h Range):  Temp:  [98.5 °F (36.9 °C)-104.5 °F (40.3 °C)] 98.5 °F (36.9 °C)  Pulse:  [] 102  Resp:  [18-28] 22  SpO2:  [90 %-98 %] 91 %  BP: ()/(52-72) 105/72     Weight: 101.3 kg (223 lb 5.2 oz)  Body mass index is 34.98 kg/m².    Physical Exam    Constitutional: He is oriented to person, place, and time. He appears well-developed and well-nourished. No distress.   obese   HENT:   Head: Normocephalic and atraumatic.   Right Ear: External ear normal.   Left Ear: External ear normal.   Nose: Nose normal.   Eyes: Right eye exhibits no discharge. Left eye exhibits no discharge.   Neck: Normal range of motion.   Cardiovascular: Normal rate, regular rhythm, normal heart sounds and intact distal pulses.  Exam reveals no gallop and no friction rub.    No murmur heard.  Pulmonary/Chest: Effort normal and breath sounds normal. No respiratory distress. He has no wheezes. He has no rales. He exhibits no tenderness.   Abdominal: Soft. Bowel sounds are normal. He exhibits no distension. There is no tenderness. There is no rebound and no guarding.   Musculoskeletal: Normal range of motion. He exhibits no edema.   Neurological: He is alert and oriented to person, place, and time.   Skin: Skin is warm and dry. He is not diaphoretic. No erythema.   Psychiatric: He has a normal mood and affect. His behavior is normal. Judgment and thought content normal.   Nursing note and vitals reviewed.          Significant Labs: All pertinent labs within the past 24 hours have been reviewed.    Significant Imaging: I have reviewed and interpreted all pertinent imaging results/findings within the past 24 hours.

## 2018-02-15 NOTE — NURSING
Patients vitals returning to patients ranges fever has improved with advil and zofran effective for upset stomach . No acute distress. Bed alarm on call light in bed head of bed up and side rails up x 3. Paged dr cline informed patients heart rate was up in 130's during acute phase of fever medicated for fever and nausea and no sputum collected patient has poor cough . No new orders received .

## 2018-02-16 NOTE — ASSESSMENT & PLAN NOTE
This admission is Mr. Bolton's first encounter within the system so, unfortunately, we have no previous labwork to which to compare.  Her differential is significant for a granulocytosis and occasional tear drops cells.  I do not think this is DIC given the absence of schistocytes on peripheral smear and I doubt TTP given his normal mentation and renal function, though the absence of these findings do not exclude those conditions. Rapid HIV is negative, and in the setting of his pneumonia, a PPD test.  consult Hematology for further recommendations.,was worsening ,per hematology may duo infections,hepatitis c is positive,has been started on Granix with improvement.granix DC ed.

## 2018-02-16 NOTE — PLAN OF CARE
02/16/18 0802   Discharge Reassessment   Assessment Type Discharge Planning Reassessment   Do you have any problems affording any of your prescribed medications? TBD   Discharge Plan A Home   Discharge Plan B Shelter   Patient choice form signed by patient/caregiver N/A   Can the patient answer the patient profile reliably? Yes, cognitively intact   How does the patient rate their overall health at the present time? Poor   Describe the patient's ability to walk at the present time. Minor restrictions or changes   Number of comorbid conditions (as recorded on the chart) None   During the past month, has the patient often been bothered by feeling down, depressed or hopeless? No   During the past month, has the patient often been bothered by little interest or pleasure in doing things? No     Pt informed SW he lives alone in an apartment @ [address] SW asked pt if he has any family support and pt stated, yes, his mother helps him out. SW asked pt name and number for his mother, according to pt, his mother name is, Alberta, and her number is [phone]. SW contacted number, which is the number listed in chart and Alberta did not answer. INESSA asked pt for a current phone number for himself and he informed SW he does not have a phone at this time. INESSA asked pt about PCP and per pt, his PCP is with the [facility] team. INESSA contacted [facility] team @ [phone], and INESSA learned pt has been discharged from the ACT team.     If pt is TB negative, pt will be able to follow-up at the [facility], however, if pt is TB positive, other measures will be taken for pt discharge plans. Pt discharge unclear at this time. SW will continue to follow, and INESSA will attempt to contact pt mother.

## 2018-02-16 NOTE — SUBJECTIVE & OBJECTIVE
Past Medical History:   Diagnosis Date    Hypertension        History reviewed. No pertinent surgical history.    Review of patient's allergies indicates:  No Known Allergies    No current facility-administered medications on file prior to encounter.      No current outpatient prescriptions on file prior to encounter.     Family History     None        Social History Main Topics    Smoking status: Current Every Day Smoker     Packs/day: 1.00    Smokeless tobacco: Never Used    Alcohol use No    Drug use: No    Sexual activity: Not on file     Review of Systems   Constitutional: Positive for activity change, appetite change, fatigue and fever. Negative for chills, diaphoresis and unexpected weight change.   HENT: Negative.    Eyes: Negative.    Respiratory: Negative for cough, chest tightness, shortness of breath and wheezing.    Cardiovascular: Negative for chest pain, palpitations and leg swelling.   Gastrointestinal: Negative for abdominal distention, abdominal pain, blood in stool, constipation, diarrhea, nausea and vomiting.   Genitourinary: Negative for dysuria.   Musculoskeletal: Negative.    Skin: Negative.    Neurological: Positive for weakness. Negative for dizziness, seizures, syncope and light-headedness.   Psychiatric/Behavioral: Negative.      Objective:     Vital Signs (Most Recent):  Temp: (!) 102.3 °F (39.1 °C) (02/16/18 0934)  Pulse: (!) 125 (02/16/18 0934)  Resp: 20 (02/16/18 0753)  BP: 139/66 (02/16/18 0934)  SpO2: (!) 93 % (02/16/18 0934) Vital Signs (24h Range):  Temp:  [98.1 °F (36.7 °C)-103.6 °F (39.8 °C)] 102.3 °F (39.1 °C)  Pulse:  [] 125  Resp:  [18-33] 20  SpO2:  [89 %-97 %] 93 %  BP: ()/(57-72) 139/66     Weight: 108 kg (238 lb 1.6 oz)  Body mass index is 37.29 kg/m².    Physical Exam   Constitutional: He is oriented to person, place, and time. He appears well-developed and well-nourished. No distress.   obese   HENT:   Head: Normocephalic and atraumatic.   Right Ear:  External ear normal.   Left Ear: External ear normal.   Nose: Nose normal.   Eyes: Right eye exhibits no discharge. Left eye exhibits no discharge.   Neck: Normal range of motion.   Cardiovascular: Normal rate, regular rhythm, normal heart sounds and intact distal pulses.  Exam reveals no gallop and no friction rub.    No murmur heard.  Pulmonary/Chest: Effort normal and breath sounds normal. No respiratory distress. He has no wheezes. He has no rales. He exhibits no tenderness.   Abdominal: Soft. Bowel sounds are normal. He exhibits no distension. There is no tenderness. There is no rebound and no guarding.   Musculoskeletal: Normal range of motion. He exhibits no edema.   Neurological: He is alert and oriented to person, place, and time.   Skin: Skin is warm and dry. He is not diaphoretic. No erythema.   Psychiatric: He has a normal mood and affect. His behavior is normal. Judgment and thought content normal.   Nursing note and vitals reviewed.          Significant Labs: All pertinent labs within the past 24 hours have been reviewed.    Significant Imaging: I have reviewed and interpreted all pertinent imaging results/findings within the past 24 hours.

## 2018-02-16 NOTE — PROGRESS NOTES
Informed Dr. Espinal patients heart evelin is in the 120's and temperature is 102.3 oral. Vervalized acknowledgement.  New order placed for ID consult noted Will continue to monitor.

## 2018-02-16 NOTE — CONSULTS
.ID CONSULT     FULL CONSULT DICTATED#[id number]    IMPRESSION;   1) Fever of unclear etiology.  2) HepC antibody (RNA confirmation pending).  3) CAP (?).  4) Pancytopenia.     RECOMMENDATIONS;   1) Cont with the vanc and cefepime.   2) Please have pharmacy help to manage the vanc/troughs.  3) CT of chest, ab, and pelvis for ID or neoplastic evaluation.   4) Unclear as to why M-TB work-up is under way?  5) We will cont to follow.

## 2018-02-16 NOTE — ASSESSMENT & PLAN NOTE
The patient has a CURB-65 score of 0 and does not meet criteria for healthcare-associated pneumonia.  I have reviewed the chest X-ray and it reveals a left lower lobe infiltrate that is suspicious for pneumonia.  Oxygen saturations are stable. On NC O 2,  Blood and sputum cultures are pending. On IV antibiotic therapy.has been broadened to vanc and cefepime,Ordered all TB panels.with AFB culture,smear,gram stain (negative),goldenferon,also PPD has been placed.,negative PPD,still is febrile,consulted ID.

## 2018-02-16 NOTE — PLAN OF CARE
Problem: Pneumonia (Adult)  Intervention: Maximize Oxygenation/Ventilation/Perfusion   02/16/18 0024   Respiratory Interventions   Airway/Ventilation Management airway patency maintained;pulmonary hygiene promoted   Positioning   Head of Bed (HOB) HOB at 30-45 degrees   Incentive Spirometer   Administration (Incentive Spirometer) done independently per patient     Intervention: Prevent/Manage Infection Progression   02/16/18 0024   Safety Interventions   Infection Prevention screen/restrict visitors;personal protective equipment utilized;environmental surveillance   Prevent/Manage Colorectal Surgical Infection   Fever Reduction/Comfort Measures cooling blanket applied;lightweight clothing;medication administered     Intervention: Monitor/Manage Fluid Electrolyte Balance   02/16/18 0024   Nutrition Interventions   Fluid/Electrolyte Management fluids provided       Goal: Signs and Symptoms of Listed Potential Problems Will be Absent, Minimized or Managed (Pneumonia)  Signs and symptoms of listed potential problems will be absent, minimized or managed by discharge/transition of care (reference Pneumonia (Adult) CPG).   Outcome: Ongoing (interventions implemented as appropriate)   02/16/18 0024   Pneumonia   Problems Assessed (Pneumonia) all   Problems Present (Pneumonia) respiratory compromise

## 2018-02-16 NOTE — NURSING
Rounding report given to leigha negrete rn on patients progress and updated handoff report sheet given too her as well afebrile heart rate decreased and fever as well. No acute change on telemetry . Denies pain . Received a total bed bath this evening and linens changed too.

## 2018-02-16 NOTE — PLAN OF CARE
Problem: Infection, Risk/Actual (Adult)  Intervention: Manage Suspected/Actual Infection   02/16/18 1623   Safety Interventions   Isolation Precautions airborne precautions maintained;contact precautions initiated;Special Contact Precautions (C. Diff.)     Intervention: Prevent Infection/Maximize Resistance   02/16/18 1623   Respiratory Interventions   Airway/Ventilation Management airway patency maintained       Goal: Identify Related Risk Factors and Signs and Symptoms  Related risk factors and signs and symptoms are identified upon initiation of Human Response Clinical Practice Guideline (CPG)   Outcome: Ongoing (interventions implemented as appropriate)   02/16/18 1623   Infection, Risk/Actual   Related Risk Factors (Infection, Risk/Actual) chronic illness/condition     Goal: Infection Prevention/Resolution  Patient will demonstrate the desired outcomes by discharge/transition of care.   Outcome: Ongoing (interventions implemented as appropriate)   02/16/18 1623   Infection, Risk/Actual (Adult)   Infection Prevention/Resolution making progress toward outcome

## 2018-02-16 NOTE — PROGRESS NOTES
Covering for .  On Granix for Neutropenia.    WBC today 3.09 with 78% neutrophils - Neutropenia resolved.  Will d/c Granix.    Rec - Monitor counts.

## 2018-02-16 NOTE — PROGRESS NOTES
"[facility] Hospital Medicine  Progress Note    Patient Name: Dwight Bolton  MRN: [id number]  Patient Class: IP- Inpatient   Admission Date: 2/12/2018  Length of Stay: 4 days  Attending Physician: Shandra Shepherd MD  Primary Care Provider: Primary Doctor No        Subjective:     Principal Problem:Community acquired bacterial pneumonia    HPI:  Mr. Dwight Bolton is a 57 y.o. male with essential hypertension who presents to [facility] ED with complaints of fall today.  He has been feeling weak "for a while" and is always short of breath but says that he came here because he had a fall today.  He denies any loss of consciousness, lightheadedness, nor did he sustain any head trauma.  He denies any antecedent chest pain or palpitations, but he does say that he is always short of breath and it's not any worse than usual.  He denies any fever, chills, nausea, vomiting, nor headaches.  He decided to come here when he couldn't stand back up.  He is otherwise a very poor historian.    Hospital Course:  Mr. Dwight Bolton is a 57 y.o. male with essential hypertension who presents to [facility] ED with complaints of fall .  He has been feeling weak "for a while" and is always short of breath but says that he came here because he had a fall .  He denies any loss of consciousness, lightheadedness, nor did he sustain any head trauma.  He denies any antecedent chest pain or palpitations, but he does say that he is always short of breath and it's not any worse than usual.  He denies any fever, chills, nausea, vomiting, nor headaches.  He decided to come here when he couldn't stand back up.  He is otherwise a very poor historian.chest x ray  show LLL pneumonia,patient has been started on IV Abx,he is septic with fever and leucocytopenia ,also pancytopenic HIV is negative and hepatitis panel is positive for Hep. C.  Ordered all TB panels.with AFB culture,smear,gram stain (negative),goldenferon,also PPD has been " placed,negative,.but pancytopenia was worsening,no major growths in cultures.hematology started on Ganix with improvement.  Still is febrile,consulted ID.    Past Medical History:   Diagnosis Date    Hypertension        History reviewed. No pertinent surgical history.    Review of patient's allergies indicates:  No Known Allergies    No current facility-administered medications on file prior to encounter.      No current outpatient prescriptions on file prior to encounter.     Family History     None        Social History Main Topics    Smoking status: Current Every Day Smoker     Packs/day: 1.00    Smokeless tobacco: Never Used    Alcohol use No    Drug use: No    Sexual activity: Not on file     Review of Systems   Constitutional: Positive for activity change, appetite change, fatigue and fever. Negative for chills, diaphoresis and unexpected weight change.   HENT: Negative.    Eyes: Negative.    Respiratory: Negative for cough, chest tightness, shortness of breath and wheezing.    Cardiovascular: Negative for chest pain, palpitations and leg swelling.   Gastrointestinal: Negative for abdominal distention, abdominal pain, blood in stool, constipation, diarrhea, nausea and vomiting.   Genitourinary: Negative for dysuria.   Musculoskeletal: Negative.    Skin: Negative.    Neurological: Positive for weakness. Negative for dizziness, seizures, syncope and light-headedness.   Psychiatric/Behavioral: Negative.      Objective:     Vital Signs (Most Recent):  Temp: (!) 102.3 °F (39.1 °C) (02/16/18 0934)  Pulse: (!) 125 (02/16/18 0934)  Resp: 20 (02/16/18 0753)  BP: 139/66 (02/16/18 0934)  SpO2: (!) 93 % (02/16/18 0934) Vital Signs (24h Range):  Temp:  [98.1 °F (36.7 °C)-103.6 °F (39.8 °C)] 102.3 °F (39.1 °C)  Pulse:  [] 125  Resp:  [18-33] 20  SpO2:  [89 %-97 %] 93 %  BP: ()/(57-72) 139/66     Weight: 108 kg (238 lb 1.6 oz)  Body mass index is 37.29 kg/m².    Physical Exam   Constitutional: He is  oriented to person, place, and time. He appears well-developed and well-nourished. No distress.   obese   HENT:   Head: Normocephalic and atraumatic.   Right Ear: External ear normal.   Left Ear: External ear normal.   Nose: Nose normal.   Eyes: Right eye exhibits no discharge. Left eye exhibits no discharge.   Neck: Normal range of motion.   Cardiovascular: Normal rate, regular rhythm, normal heart sounds and intact distal pulses.  Exam reveals no gallop and no friction rub.    No murmur heard.  Pulmonary/Chest: Effort normal and breath sounds normal. No respiratory distress. He has no wheezes. He has no rales. He exhibits no tenderness.   Abdominal: Soft. Bowel sounds are normal. He exhibits no distension. There is no tenderness. There is no rebound and no guarding.   Musculoskeletal: Normal range of motion. He exhibits no edema.   Neurological: He is alert and oriented to person, place, and time.   Skin: Skin is warm and dry. He is not diaphoretic. No erythema.   Psychiatric: He has a normal mood and affect. His behavior is normal. Judgment and thought content normal.   Nursing note and vitals reviewed.          Significant Labs: All pertinent labs within the past 24 hours have been reviewed.    Significant Imaging: I have reviewed and interpreted all pertinent imaging results/findings within the past 24 hours.    Assessment/Plan:      * Community acquired bacterial pneumonia    The patient has a CURB-65 score of 0 and does not meet criteria for healthcare-associated pneumonia.  I have reviewed the chest X-ray and it reveals a left lower lobe infiltrate that is suspicious for pneumonia.  Oxygen saturations are stable. On NC O 2,  Blood and sputum cultures are pending. On IV antibiotic therapy.has been broadened to vanc and cefepime,Ordered all TB panels.with AFB culture,smear,gram stain (negative),goldenferon,also PPD has been placed.,negative PPD,still is febrile,consulted ID.        Chronic hepatitis C virus  infection    Will monitor with LFT,check ammonia level.he knows he has liver cirrhosis.        Pneumonia of left lower lobe due to infectious organism    On broad spectrum IV Abx.changed to vanc and cefepime,.          Essential hypertension    His blood pressure is well-controlled and he is not on medications; will provide as-needed clonidine.        Hypokalemia    Given that his calcium is also low, I suspect hypomagnesemia.  Will obtain a magnesium level and replete his potassium orally; will recheck his potassium ,reoplace as needed.        Pancytopenia    This admission is Mr. Bolton's first encounter within the system so, unfortunately, we have no previous labwork to which to compare.  Her differential is significant for a granulocytosis and occasional tear drops cells.  I do not think this is DIC given the absence of schistocytes on peripheral smear and I doubt TTP given his normal mentation and renal function, though the absence of these findings do not exclude those conditions. Rapid HIV is negative, and in the setting of his pneumonia, a PPD test.  consult Hematology for further recommendations.,was worsening ,per hematology may duo infections,hepatitis c is positive,has been started on Granix with improvement.granix [place] ed.        Sepsis    This patient meets criteria for sepsis given fever, tachycardia, leukopenia, and pneumonia; there is no evidence of end-organ dysfunction.  Blood and urine cultures are pending;on  broad spectrum antibiotics.          VTE Risk Mitigation         Ordered     Medium Risk of VTE  Once      02/12/18 3790              Shandra Shepherd MD  Department of Hospital Medicine   [facility]

## 2018-02-17 NOTE — SUBJECTIVE & OBJECTIVE
Past Medical History:   Diagnosis Date    Hypertension        History reviewed. No pertinent surgical history.    Review of patient's allergies indicates:  No Known Allergies    No current facility-administered medications on file prior to encounter.      No current outpatient prescriptions on file prior to encounter.     Family History     None        Social History Main Topics    Smoking status: Current Every Day Smoker     Packs/day: 1.00    Smokeless tobacco: Never Used    Alcohol use No    Drug use: No    Sexual activity: Not on file     Review of Systems   Constitutional: Positive for activity change, appetite change, fatigue and fever. Negative for chills, diaphoresis and unexpected weight change.   HENT: Negative.    Eyes: Negative.    Respiratory: Negative for cough, chest tightness, shortness of breath and wheezing.    Cardiovascular: Negative for chest pain, palpitations and leg swelling.   Gastrointestinal: Negative for abdominal distention, abdominal pain, blood in stool, constipation, diarrhea, nausea and vomiting.   Genitourinary: Negative for dysuria.   Musculoskeletal: Negative.    Skin: Negative.    Neurological: Positive for weakness. Negative for dizziness, seizures, syncope and light-headedness.   Psychiatric/Behavioral: Negative.      Objective:     Vital Signs (Most Recent):  Temp: 98.1 °F (36.7 °C) (02/17/18 0739)  Pulse: 101 (02/17/18 0744)  Resp: (!) 24 (02/17/18 0744)  BP: (!) 156/72 (02/17/18 0739)  SpO2: (!) 94 % (02/17/18 0744) Vital Signs (24h Range):  Temp:  [98.1 °F (36.7 °C)-102.4 °F (39.1 °C)] 98.1 °F (36.7 °C)  Pulse:  [] 101  Resp:  [20-26] 24  SpO2:  [90 %-99 %] 94 %  BP: (103-156)/(50-72) 156/72     Weight: 97.8 kg (215 lb 9.8 oz)  Body mass index is 33.77 kg/m².    Physical Exam   Constitutional: He is oriented to person, place, and time. He appears well-developed and well-nourished. No distress.   obese   HENT:   Head: Normocephalic and atraumatic.   Right Ear:  External ear normal.   Left Ear: External ear normal.   Nose: Nose normal.   Eyes: Right eye exhibits no discharge. Left eye exhibits no discharge.   Neck: Normal range of motion.   Cardiovascular: Normal rate, regular rhythm, normal heart sounds and intact distal pulses.  Exam reveals no gallop and no friction rub.    No murmur heard.  Pulmonary/Chest: Effort normal and breath sounds normal. No respiratory distress. He has no wheezes. He has no rales. He exhibits no tenderness.   Abdominal: Soft. Bowel sounds are normal. He exhibits no distension. There is no tenderness. There is no rebound and no guarding.   Musculoskeletal: Normal range of motion. He exhibits no edema.   Neurological: He is alert and oriented to person, place, and time.   Skin: Skin is warm and dry. He is not diaphoretic. No erythema.   Psychiatric: He has a normal mood and affect. His behavior is normal. Judgment and thought content normal.   Nursing note and vitals reviewed.          Significant Labs: All pertinent labs within the past 24 hours have been reviewed.    Significant Imaging: I have reviewed and interpreted all pertinent imaging results/findings within the past 24 hours.

## 2018-02-17 NOTE — PROGRESS NOTES
"[facility] Hospital Medicine  Progress Note    Patient Name: Dwight Bolton  MRN: [id number]  Patient Class: IP- Inpatient   Admission Date: 2/12/2018  Length of Stay: 5 days  Attending Physician: Shandra Shepherd MD  Primary Care Provider: Primary Doctor No        Subjective:     Principal Problem:Community acquired bacterial pneumonia    HPI:  Mr. Dwight Bolton is a 57 y.o. male with essential hypertension who presents to [facility] ED with complaints of fall today.  He has been feeling weak "for a while" and is always short of breath but says that he came here because he had a fall today.  He denies any loss of consciousness, lightheadedness, nor did he sustain any head trauma.  He denies any antecedent chest pain or palpitations, but he does say that he is always short of breath and it's not any worse than usual.  He denies any fever, chills, nausea, vomiting, nor headaches.  He decided to come here when he couldn't stand back up.  He is otherwise a very poor historian.    Hospital Course:  Mr. Dwight Bolton is a 57 y.o. male with essential hypertension who presents to [facility] ED with complaints of fall .  He has been feeling weak "for a while" and is always short of breath but says that he came here because he had a fall .  He denies any loss of consciousness, lightheadedness, nor did he sustain any head trauma.  He denies any antecedent chest pain or palpitations, but he does say that he is always short of breath and it's not any worse than usual.  He denies any fever, chills, nausea, vomiting, nor headaches.  He decided to come here when he couldn't stand back up.  He is otherwise a very poor historian.chest x ray  show LLL pneumonia,patient has been started on IV Abx,he is septic with fever and leucocytopenia ,also pancytopenic HIV is negative and hepatitis panel is positive for Hep. C.  Ordered all TB panels.with AFB culture,smear,gram stain (negative),goldenferon,also PPD has been " placed,negative,.but pancytopenia was worsening,no major growths in cultures.hematology started on Ganix with improvement.  Still is febrile,consulted ID.will have CT abdomen,pelvic.    Past Medical History:   Diagnosis Date    Hypertension        History reviewed. No pertinent surgical history.    Review of patient's allergies indicates:  No Known Allergies    No current facility-administered medications on file prior to encounter.      No current outpatient prescriptions on file prior to encounter.     Family History     None        Social History Main Topics    Smoking status: Current Every Day Smoker     Packs/day: 1.00    Smokeless tobacco: Never Used    Alcohol use No    Drug use: No    Sexual activity: Not on file     Review of Systems   Constitutional: Positive for activity change, appetite change, fatigue and fever. Negative for chills, diaphoresis and unexpected weight change.   HENT: Negative.    Eyes: Negative.    Respiratory: Negative for cough, chest tightness, shortness of breath and wheezing.    Cardiovascular: Negative for chest pain, palpitations and leg swelling.   Gastrointestinal: Negative for abdominal distention, abdominal pain, blood in stool, constipation, diarrhea, nausea and vomiting.   Genitourinary: Negative for dysuria.   Musculoskeletal: Negative.    Skin: Negative.    Neurological: Positive for weakness. Negative for dizziness, seizures, syncope and light-headedness.   Psychiatric/Behavioral: Negative.      Objective:     Vital Signs (Most Recent):  Temp: 98.1 °F (36.7 °C) (02/17/18 0739)  Pulse: 101 (02/17/18 0744)  Resp: (!) 24 (02/17/18 0744)  BP: (!) 156/72 (02/17/18 0739)  SpO2: (!) 94 % (02/17/18 0744) Vital Signs (24h Range):  Temp:  [98.1 °F (36.7 °C)-102.4 °F (39.1 °C)] 98.1 °F (36.7 °C)  Pulse:  [] 101  Resp:  [20-26] 24  SpO2:  [90 %-99 %] 94 %  BP: (103-156)/(50-72) 156/72     Weight: 97.8 kg (215 lb 9.8 oz)  Body mass index is 33.77 kg/m².    Physical Exam    Constitutional: He is oriented to person, place, and time. He appears well-developed and well-nourished. No distress.   obese   HENT:   Head: Normocephalic and atraumatic.   Right Ear: External ear normal.   Left Ear: External ear normal.   Nose: Nose normal.   Eyes: Right eye exhibits no discharge. Left eye exhibits no discharge.   Neck: Normal range of motion.   Cardiovascular: Normal rate, regular rhythm, normal heart sounds and intact distal pulses.  Exam reveals no gallop and no friction rub.    No murmur heard.  Pulmonary/Chest: Effort normal and breath sounds normal. No respiratory distress. He has no wheezes. He has no rales. He exhibits no tenderness.   Abdominal: Soft. Bowel sounds are normal. He exhibits no distension. There is no tenderness. There is no rebound and no guarding.   Musculoskeletal: Normal range of motion. He exhibits no edema.   Neurological: He is alert and oriented to person, place, and time.   Skin: Skin is warm and dry. He is not diaphoretic. No erythema.   Psychiatric: He has a normal mood and affect. His behavior is normal. Judgment and thought content normal.   Nursing note and vitals reviewed.          Significant Labs: All pertinent labs within the past 24 hours have been reviewed.    Significant Imaging: I have reviewed and interpreted all pertinent imaging results/findings within the past 24 hours.    Assessment/Plan:      * Community acquired bacterial pneumonia    The patient has a CURB-65 score of 0 and does not meet criteria for healthcare-associated pneumonia.  I have reviewed the chest X-ray and it reveals a left lower lobe infiltrate that is suspicious for pneumonia.  Oxygen saturations are stable. On NC O 2,  Blood and sputum cultures are pending. On IV antibiotic therapy.has been broadened to vanc and cefepime,Ordered all TB panels.with AFB culture,smear,gram stain (negative),goldenferon,also PPD has been placed.,negative PPD,fere is improved,,consulted ID.will have  CT abdomen,pelvic.        Chronic hepatitis C virus infection    Will monitor with LFT,check ammonia level.he knows he has liver cirrhosis.        Pneumonia of left lower lobe due to infectious organism    On broad spectrum IV Abx.changed to vanc and cefepime,.          Essential hypertension    His blood pressure is well-controlled and he is not on medications; will provide as-needed clonidine.        Hypokalemia    Given that his calcium is also low, I suspect hypomagnesemia.  Will obtain a magnesium level and replete his potassium orally; will recheck his potassium ,reoplace as needed.        Pancytopenia    This admission is Mr. Bolton's first encounter within the system so, unfortunately, we have no previous labwork to which to compare.  Her differential is significant for a granulocytosis and occasional tear drops cells.  I do not think this is DIC given the absence of schistocytes on peripheral smear and I doubt TTP given his normal mentation and renal function, though the absence of these findings do not exclude those conditions. Rapid HIV is negative, and in the setting of his pneumonia, a PPD test.  consult Hematology for further recommendations.,was worsening ,per hematology may duo infections,hepatitis c is positive,has been started on Granix with improvement.granix [place] ed.        Sepsis    This patient meets criteria for sepsis given fever, tachycardia, leukopenia, and pneumonia; there is no evidence of end-organ dysfunction.  Blood and urine cultures are pending;on  broad spectrum antibiotics.          VTE Risk Mitigation         Ordered     Medium Risk of VTE  Once      02/12/18 3353              Shandra Shepherd MD  Department of Hospital Medicine   [facility]

## 2018-02-17 NOTE — ASSESSMENT & PLAN NOTE
The patient has a CURB-65 score of 0 and does not meet criteria for healthcare-associated pneumonia.  I have reviewed the chest X-ray and it reveals a left lower lobe infiltrate that is suspicious for pneumonia.  Oxygen saturations are stable. On NC O 2,  Blood and sputum cultures are pending. On IV antibiotic therapy.has been broadened to vanc and cefepime,Ordered all TB panels.with AFB culture,smear,gram stain (negative),goldenferon,also PPD has been placed.,negative PPD,fere is improved,,consulted ID.will have CT abdomen,pelvic.

## 2018-02-17 NOTE — PROGRESS NOTES
Pt received from telemetry unit and placed on Bipap 16/8 and 60% with NARN, will monitor pt status and wean as tolerated.Pt face and nose checked for redness or breakdown. None noted at this time.

## 2018-02-17 NOTE — NURSING
Pt resting quietly in bed with eyes closed.  Denies pain, and sob.  HR sustained between 130 and 140.  Temp 102.4 after receiving tylenol.  Ibuprofen given for temp.  Dr Carmen notified.  Received order to start NS at 125/hr.

## 2018-02-17 NOTE — SIGNIFICANT EVENT
Patient received on nasal cannula 6 lpm. Aerosol treatment given. Heart rate monitored throughout treatment. sp02 92%

## 2018-02-17 NOTE — PROGRESS NOTES
02/17/18 0843   Critical Value Communication   Date Result Received 02/17/18   Time Result Received 0843   Resulting Department of Critical Value lab   Who communicated critical value from resulting department? Regine PATEL   Critical Test #1 Platelet   Critical Test #1 Result 34     Dr. Espinal Notified

## 2018-02-17 NOTE — PLAN OF CARE
Problem: Fall Risk (Adult)  Goal: Absence of Falls  Patient will demonstrate the desired outcomes by discharge/transition of care.   Outcome: Ongoing (interventions implemented as appropriate)   02/17/18 0346   Fall Risk (Adult)   Absence of Falls making progress toward outcome       Problem: Pneumonia (Adult)  Goal: Signs and Symptoms of Listed Potential Problems Will be Absent, Minimized or Managed (Pneumonia)  Signs and symptoms of listed potential problems will be absent, minimized or managed by discharge/transition of care (reference Pneumonia (Adult) CPG).   Outcome: Ongoing (interventions implemented as appropriate)   02/17/18 0346   Pneumonia   Problems Assessed (Pneumonia) infection progression   Problems Present (Pneumonia) infection progression       Problem: Infection, Risk/Actual (Adult)  Goal: Infection Prevention/Resolution  Patient will demonstrate the desired outcomes by discharge/transition of care.   Outcome: Ongoing (interventions implemented as appropriate)   02/17/18 0346   Infection, Risk/Actual (Adult)   Infection Prevention/Resolution making progress toward outcome

## 2018-02-18 PROBLEM — J96.02 ACUTE RESPIRATORY FAILURE WITH HYPOXIA AND HYPERCARBIA: Status: ACTIVE | Noted: 2018-01-01

## 2018-02-18 PROBLEM — A41.9 SEPTIC SHOCK: Status: ACTIVE | Noted: 2018-01-01

## 2018-02-18 PROBLEM — K92.2 GIB (GASTROINTESTINAL BLEEDING): Status: ACTIVE | Noted: 2018-01-01

## 2018-02-18 PROBLEM — J96.01 ACUTE RESPIRATORY FAILURE WITH HYPOXIA AND HYPERCARBIA: Status: ACTIVE | Noted: 2018-01-01

## 2018-02-18 PROBLEM — R65.21 SEPTIC SHOCK: Status: ACTIVE | Noted: 2018-01-01

## 2018-02-18 PROBLEM — J69.0 ASPIRATION PNEUMONIA: Status: ACTIVE | Noted: 2018-01-01

## 2018-02-18 NOTE — EICU
eICU Note :    Called by the [facility] eRN:    Problem:ABG on BIPAP,16/8, FiO2 60%    Pertinent History and labs reviewed : 56 y/o homeless man with Chronic Hepatitis C and HTN admitted with high fevers, Pancytopenia , Sepsis, Active Smoker , CXR  LLL pneumonia arterial blood gases 7.38/61/65/38/13/91    Treatment /Intervention given:Continue current treatment        Saundra Gorman M.D  eICU Physician  8:30PM  Pt is Tachypneic, fever 101.4 desatting into the 80's on FiO2 60%  CXR CHF  Will repeat STAT CXR and give IV tylenol stat  9:06 PM  CXR: new LL pneumonia , Added Zithromax  Steroids were added due to extensive wheezing

## 2018-02-18 NOTE — ASSESSMENT & PLAN NOTE
"Reportedly patient was aware of "cirrhosis" diagnosis but he does not reportedly drink ETOH  No previous report of Hep C until this admission with workup with hepatitis panel  Viral RNA pending  LFTs with mild elevation  Will check synthetic function of liver, CT scan with no gross abnormality seen in the liver  Will follow LFTs  "

## 2018-02-18 NOTE — HPI
"Mr. Dwight Bolton is a 57 y.o. male with essential hypertension who presents to [facility] ED with complaints of fall today.  He has been feeling weak "for a while" and is always short of breath but says that he came here because he had a fall today.  He denies any loss of consciousness, lightheadedness, nor did he sustain any head trauma.  He denies any antecedent chest pain or palpitations, but he does say that he is always short of breath and it's not any worse than usual.  He denies any fever, chills, nausea, vomiting, nor headaches.  He decided to come here when he couldn't stand back up.  He is otherwise a very poor historian.     Hospital Course:  Mr. Bolton has a history of severe schizophrenia that despite adequate social support from family has been unresponsive to medical treatment and has resulted in isolation from his family and living as if he were homeless.  He presented with weakness and falling, and admitted with LLL pneumonia treated with Rocephin/Azithromycin. He was septic with persistent fever and pancytopenia. Workup remarkable for Hep C and he is HIV negative. He never complained of hemoptysis, but given persistent fever, TB workup was done with AFB smears negative thus far and PPD negative. Quantiferon gold pending. Pancytopenia workup in progress by Hem/Onc with low retic count suspicious for marrow failure and he was given Granix with improvement in his neutropenia. ID consulted with antibiotic regimen just changed to Cefepime on 2/17 and continued on Vancomycin started on 2/14, and Azithromycin restarted on 2/17. Pt was on respiratory isolation. Transferred to the ICU on 2/17 for continued respiratory decompensation on BIPAP.    Pulmonary consulted for hypoxic respiratory failure and ventilator management. Patient is currently intubated and sedated. Hx as above taken from patient's chart.   "

## 2018-02-18 NOTE — ASSESSMENT & PLAN NOTE
Intubated for worsening respiratory distress. Multilobar PNA. Most likely aspiration PNA. Patient with significant ventilator requirements. Patient has active bleeding with low platelets. P/F ration around 150. Would not prone due to bleeding.   -Low Vt ventilation.   -Wean Vent according to [facility] protocol  -Daily awakening trial.   -On Vanc and Unasyn.   -Follow up cultures.

## 2018-02-18 NOTE — ASSESSMENT & PLAN NOTE
Pt with multilobar pneumonia, likely aspiration from the history  Immunocompromised state with neutropenia and malnutrition   Continued respiratory decompensation with increased work of breathing and increasing pCO2  Will intubate and place on vent  Current antibiotic regimen adequate at this time, and will discuss with ID  Unclear as to why the patient was on steroids, will d/c  Pulmonary consulted

## 2018-02-18 NOTE — ASSESSMENT & PLAN NOTE
On levophed. Shock likely secondary to PNA. Trend lactate. On broad spectrum Abx. Follow up cultures.

## 2018-02-18 NOTE — CONSULTS
Patient seen , chart reviewed and consult dictated:[id number]  1.GI bleeding:lower  2. Respiratory failure/ Bilateral Pneumonia  3.pancytopenia  4.HTN  5.Chronic hepatitis C infection  6.Incomplete data  Plan: will start on pantoprazole IVPB. Transfuse PRBC/Platelets. Need GI work up when stable. Will follow with you. Thanks for the consult.

## 2018-02-18 NOTE — PROCEDURES
"Dwight Bolton is a 58 y.o. male patient.    Temp: 98.3 °F (36.8 °C) (02/18/18 1629)  Pulse: 77 (02/18/18 1730)  Resp: (!) 24 (02/18/18 1730)  BP: (!) 93/56 (02/18/18 1629)  SpO2: 96 % (02/18/18 1730)  Weight: 106.8 kg (235 lb 7.2 oz) (02/17/18 1700)  Height: 5' 7" (170.2 cm) (02/12/18 2250)       Central Line  Date/Time: 2/18/2018 5:33 PM  Location procedure was performed: [facility]  Performed by: IVAN ESQUIVEL  Consent Done: Emergent Situation  Time out: Immediately prior to procedure a "time out" was called to verify the correct patient, procedure, equipment, support staff and site/side marked as required.  Indications: med administration and vascular access  Anesthesia: local infiltration    Anesthesia:  Local Anesthetic: lidocaine 1% with epinephrine and lidocaine 1% without epinephrine  Anesthetic total: 5 mL  Preparation: skin prepped with ChloraPrep  Skin prep agent dried: skin prep agent completely dried prior to procedure  Sterile barriers: all five maximum sterile barriers used - cap, mask, sterile gown, sterile gloves, and large sterile sheet  Hand hygiene: hand hygiene performed prior to central venous catheter insertion  Location details: right internal jugular  Catheter type: triple lumen  Catheter size: 7 Fr  Catheter Length: 15cm    Ultrasound guidance: yes  Vessel Caliber: medium, patent, compressibility normal  Needle advanced into vessel with real time Ultrasound guidance.  Guidewire confirmed in vessel.  Sterile sheath used.  Number of attempts: 1  Assessment: placement verified by x-ray,  no pneumothorax on x-ray,  verified by fluoroscopy and successful placement  Complications: none  Specimens: No  Implants: No  Post-procedure: line sutured,  chlorhexidine patch,  sterile dressing applied and blood return through all ports  Complications: No          Ivan Esquivel  2/18/2018  "

## 2018-02-18 NOTE — PROGRESS NOTES
.  Progress Note  Infectious Disease    Admit Date: 2/12/2018   LOS: 6 days     SUBJECTIVE:     Follow-up For:   Fever     Antibiotics     Start     Stop Route Frequency Ordered    02/17/18 2100  azithromycin 500 mg in 0.9 % sodium chloride 250 mL IVPB (ready to mix system)      -- IV Daily 02/17/18 2047 02/17/18 2000  ceFEPIme injection 1 g      -- IV Every 8 hours (non-standard times) 02/17/18 1714    02/14/18 1630  vancomycin 1 g in 0.9% sodium chloride 250 mL IVPB (ready to mix system)  (Vancomycin IVPB with levels panel)      -- IV Every 8 hours (non-standard times) 02/14/18 1547              OBJECTIVE:     Vital Signs (Most Recent)  Temp: 98.4 °F (36.9 °C) (02/18/18 1105)  Pulse: 92 (02/18/18 1130)  Resp: (!) 24 (02/18/18 1130)  BP: (!) 98/55 (02/18/18 1130)  SpO2: 98 % (02/18/18 1130)    Temperature Range Min/Max (Last 24H):  Temp:  [98.4 °F (36.9 °C)-103.8 °F (39.9 °C)]     I & O (Last 24H):  Intake/Output Summary (Last 24 hours) at 02/18/18 1159  Last data filed at 02/18/18 1100   Gross per 24 hour   Intake          1098.19 ml   Output             1485 ml   Net          -386.81 ml       Lines/Drains:       Percutaneous Central Line Insertion/Assessment - triple lumen  02/18/18 1023 right internal jugular (Active)            Peripheral IV - Single Lumen 02/18/18 0522 Right Hand (Active)   Site Assessment Dry;Clean;Intact;No redness;No swelling 2/18/2018  7:10 AM   Line Status Infusing 2/18/2018  7:10 AM   Dressing Status Clean;Dry;Intact 2/18/2018  7:10 AM   Dressing Intervention New dressing 2/17/2018 11:15 PM   Dressing Change Due 02/22/18 2/18/2018  7:10 AM   Site Change Due 02/22/18 2/17/2018 11:15 PM   Reason Not Rotated Not due 2/18/2018  7:10 AM            NG/OG Tube 02/18/18 0930 16 Fr. Center mouth (Active)       Laboratory:  Recent Results (from the past 24 hour(s))   ISTAT PROCEDURE    Collection Time: 02/17/18  4:19 PM   Result Value Ref Range    POC PH 7.300 (L) 7.35 - 7.45    POC PCO2 57.2  (HH) 35 - 45 mmHg    POC PO2 130 (H) 80 - 100 mmHg    POC HCO3 28.1 (H) 24 - 28 mmol/L    POC BE 1 -2 to 2 mmol/L    POC SATURATED O2 99 95 - 100 %    POC TCO2 30 (H) 23 - 27 mmol/L    Sample ARTERIAL     Site RR     Allens Test Pass     DelSys NRB     Mode SPONT     Flow 15    ISTAT PROCEDURE    Collection Time: 02/17/18  7:10 PM   Result Value Ref Range    POC PH 7.381 7.35 - 7.45    POC PCO2 46.2 (H) 35 - 45 mmHg    POC PO2 57 (LL) 80 - 100 mmHg    POC HCO3 27.4 24 - 28 mmol/L    POC BE 2 -2 to 2 mmol/L    POC SATURATED O2 88 (L) 95 - 100 %    POC TCO2 29 (H) 23 - 27 mmol/L    Rate 15     Sample ARTERIAL     Site RR     Allens Test Pass     DelSys CPAP/BiPAP     Mode BiPAP     FiO2 60     IP 16     EP 8    CBC auto differential    Collection Time: 02/18/18  6:00 AM   Result Value Ref Range    WBC 2.75 (L) 3.90 - 12.70 K/uL    RBC 3.29 (L) 4.60 - 6.20 M/uL    Hemoglobin 7.9 (L) 14.0 - 18.0 g/dL    Hematocrit 24.2 (L) 40.0 - 54.0 %    MCV 74 (L) 82 - 98 fL    MCH 24.0 (L) 27.0 - 31.0 pg    MCHC 32.6 32.0 - 36.0 g/dL    RDW 16.2 (H) 11.5 - 14.5 %    Platelets 28 (LL) 150 - 350 K/uL    MPV SEE COMMENT 9.2 - 12.9 fL    Lymph # CANCELED 1.0 - 4.8 K/uL    Mono # CANCELED 0.3 - 1.0 K/uL    Eos # CANCELED 0.0 - 0.5 K/uL    Baso # CANCELED 0.00 - 0.20 K/uL    Gran% 77.0 (H) 38.0 - 73.0 %    Lymph% 10.0 (L) 18.0 - 48.0 %    Mono% 1.0 (L) 4.0 - 15.0 %    Eosinophil% 0.0 0.0 - 8.0 %    Basophil% 0.0 0.0 - 1.9 %    Bands 12.0 %    Platelet Estimate Decreased (A)     Differential Method Manual    Comprehensive metabolic panel    Collection Time: 02/18/18  6:00 AM   Result Value Ref Range    Sodium 136 136 - 145 mmol/L    Potassium 4.1 3.5 - 5.1 mmol/L    Chloride 102 95 - 110 mmol/L    CO2 27 23 - 29 mmol/L    Glucose 144 (H) 70 - 110 mg/dL    BUN, Bld 14 6 - 20 mg/dL    Creatinine 1.1 0.5 - 1.4 mg/dL    Calcium 7.6 (L) 8.7 - 10.5 mg/dL    Total Protein 6.0 6.0 - 8.4 g/dL    Albumin 2.2 (L) 3.5 - 5.2 g/dL    Total Bilirubin 0.3  0.1 - 1.0 mg/dL    Alkaline Phosphatase 49 (L) 55 - 135 U/L     (H) 10 - 40 U/L    ALT 17 10 - 44 U/L    Anion Gap 7 (L) 8 - 16 mmol/L    eGFR if African American >60 >60 mL/min/1.73 m^2    eGFR if non African American >60 >60 mL/min/1.73 m^2   ISTAT PROCEDURE    Collection Time: 02/18/18  8:58 AM   Result Value Ref Range    POC PH 7.335 (L) 7.35 - 7.45    POC PCO2 56.4 (HH) 35 - 45 mmHg    POC PO2 82 80 - 100 mmHg    POC HCO3 30.1 (H) 24 - 28 mmol/L    POC BE 4 -2 to 2 mmol/L    POC SATURATED O2 95 95 - 100 %    POC TCO2 32 (H) 23 - 27 mmol/L    Sample ARTERIAL     Site LR     Allens Test Pass     DelSys CPAP/BiPAP     Mode BiPAP     FiO2 70     IP 16     EP 8    Type & Screen    Collection Time: 02/18/18  9:46 AM   Result Value Ref Range    Group & Rh B POS     Indirect Nilay NEG    Prepare RBC 2 Units; GI bleed    Collection Time: 02/18/18  9:46 AM   Result Value Ref Range    UNIT NUMBER [id number]     PRODUCT CODE F2327S68     DISPENSE STATUS CROSSMATCHED     CODING SYSTEM WQQT241     Unit Blood Type Code 7300     Unit Blood Type B POS     Unit Expiration [id number]     UNIT NUMBER [id number]     PRODUCT CODE S8718C67     DISPENSE STATUS CROSSMATCHED     CODING SYSTEM [id number]     Unit Blood Type Code 7300     Unit Blood Type B POS     Unit Expiration [id number]    ISTAT PROCEDURE    Collection Time: 02/18/18 10:48 AM   Result Value Ref Range    POC PH 7.321 (L) 7.35 - 7.45    POC PCO2 54.2 (H) 35 - 45 mmHg    POC PO2 153 (H) 80 - 100 mmHg    POC HCO3 28.0 24 - 28 mmol/L    POC BE 1 -2 to 2 mmol/L    POC SATURATED O2 99 95 - 100 %    POC TCO2 30 (H) 23 - 27 mmol/L    Rate 20     Sample ARTERIAL     Site LR     Allens Test Pass     DelSys Adult Vent     Mode AC/PRVC     Vt 450     PEEP 5     FiO2 100        Micro:  Microbiology Results (last 7 days)     Procedure Component Value Units Date/Time    Culture, Respiratory with Gram Stain [[id number]] Collected:  02/18/18 0925    Order Status:  Sent  Specimen:  Respiratory from Endotracheal Aspirate Updated:  02/18/18 0934    Blood culture [[id number]] Collected:  02/14/18 2120    Order Status:  Completed Specimen:  Blood Updated:  02/17/18 2303     Blood Culture, Routine No Growth to date     Blood Culture, Routine No Growth to date     Blood Culture, Routine No Growth to date     Blood Culture, Routine No Growth to date    Blood culture [[id number]] Collected:  02/14/18 2140    Order Status:  Completed Specimen:  Blood Updated:  02/17/18 2303     Blood Culture, Routine No Growth to date     Blood Culture, Routine No Growth to date     Blood Culture, Routine No Growth to date     Blood Culture, Routine No Growth to date    Blood culture [[id number]] Collected:  02/12/18 1755    Order Status:  Completed Specimen:  Blood from Peripheral, Hand, Left Updated:  02/17/18 2103     Blood Culture, Routine No growth after 5 days.    Blood culture [[id number]] Collected:  02/12/18 1740    Order Status:  Completed Specimen:  Blood from Peripheral, Antecubital, Left Updated:  02/17/18 2103     Blood Culture, Routine No growth after 5 days.    Clostridium difficile EIA [[id number]] Collected:  02/16/18 1550    Order Status:  Completed Specimen:  Stool from Stool Updated:  02/17/18 1433     C. diff Antigen Negative     C difficile Toxins A+B, EIA Negative     Comment: Testing not recommended for children <24 months old.       AFB Culture & Smear [[id number]]     Order Status:  No result Specimen:  Respiratory from Sputum, Expectorated     AFB Culture & Smear [[id number]] Collected:  02/17/18 1135    Order Status:  Sent Specimen:  Respiratory from Sputum, Expectorated Updated:  02/17/18 1141    AFB Culture & Smear [[id number]]     Order Status:  No result Specimen:  Respiratory     AFB Culture & Smear [[id number]] Collected:  02/13/18 1130    Order Status:  Completed Specimen:  Respiratory from Sputum, Induced Updated:  02/15/18 1529     AFB Culture & Smear Culture in progress     AFB  CULTURE STAIN No acid fast bacilli seen.    Culture, Respiratory with Gram Stain [[id number]] Collected:  02/13/18 1130    Order Status:  Completed Specimen:  Respiratory from Sputum, Induced Updated:  02/15/18 0813     Respiratory Culture Normal respiratory rosie     Gram Stain (Respiratory) <10 epithelial cells per low power field.     Gram Stain (Respiratory) Few WBC's     Gram Stain (Respiratory) Rare Gram positive cocci in pairs    Urine culture [[id number]] Collected:  02/12/18 1408    Order Status:  Completed Specimen:  Urine from Urine, Clean Catch Updated:  02/14/18 1048     Urine Culture, Routine No significant growth    Direct AFB stain [[id number]] Collected:  02/13/18 1130    Order Status:  Completed Specimen:  Respiratory from Sputum, Induced Updated:  02/13/18 1419     Direct Acid Fast No acid fast bacilli seen.              ASSESSMENT/PLAN:     Active Hospital Problems    Diagnosis  POA    *Acute respiratory failure with hypoxia and hypercarbia [J96.01, J96.02]  Unknown    Chronic hepatitis C virus infection [B18.2]  Yes    Pneumonia of left lower lobe due to infectious organism [J18.1]  Yes    Community acquired bacterial pneumonia [J15.9]  Yes    Sepsis [A41.9]  Yes    Pancytopenia [D61.818]  Yes    Hypokalemia [E87.6]  Yes    Essential hypertension [I10]  Yes     Chronic      Resolved Hospital Problems    Diagnosis Date Resolved POA   No resolved problems to display.           Physical Exam:  Gen: NAD  Pul: intubated   Cardio:  +S1+S2  Abd: Soft, NT  Ext: No edema  Skin: No active lesions      IMPRESSION;   1) Fever of unclear etiology.  2) HepC antibody (RNA confirmation pending).  3) Multilobar PNA.  4) Pancytopenia.      RECOMMENDATIONS;   1) Cont with the vanc and please have pharmacy help to manage the vanc/troughs.  2) I will start amp-sulbactam.   3) CT of chest, ab, and pelvis; reviewed.  4) Still with fevers... New set of blood cultures.   5) We will cont to follow.

## 2018-02-18 NOTE — CONSULTS
DATE OF CONSULTATION:  02/18/2018    CONSULTING DOCTOR:  Mable Johnson M.D.    INDICATIONS FOR THE CONSULTATION:  Rectal bleeding.    CLINICAL SUMMARY:  This 57-year-old gentleman with a history of hypertension,   was doing well until recently.  The patient was admitted to the hospital on   02/12/2018 for community-acquired pneumonia.  The patient has been placed on IV   antibiotics and has been doing better on the floor until yesterday.  The patient   went in to significant respiratory distress and has to be brought into the ICU,   intubated.  The patient has been intubated, unable to give any history.  Most   of the history was obtained reviewing through the chart.  The patient at the   time of admission had a white count of 2.5 thousand, H and H of 8.9 and 26.4,   his platelets were 79,000.  As of yesterday, the platelets have come down to   34,000 and today, they are 28,000.  The patient started oozing blood from the   mouth and around the intubation area and also had rectal bleeding with bright   red blood coming out of the rectum.  His hemoglobin and hematocrit had dropped   from 8.9 and 26.4 to  7.3 and 22.1 as of this morning.  Consultation was made   for evaluation of the GI bleeding and anemia.  The patient's hepatitis A, B, C   were done.  All these A and B were negative, but C does have antibodies for   hepatitis C.    PAST MEDICAL HISTORY:  Pertinent essentially for hypertension.    PAST SURGICAL HISTORY:  Negative.    SOCIAL HISTORY:  He smokes 1 pack of cigarettes per day for last several years.    He does not use drugs or he does not drink alcohol.    ALLERGIES:  He has no known drug allergies.    REVIEW OF SYSTEMS:  Not obtainable.    PHYSICAL EXAMINATION:  GENERAL:  This is an adult gentleman under sedation and intubated.  VITAL SIGNS:  As follows:  His temp of 97.7, pulse of 97, respirations 20, blood   pressure 92/54.  His pulse ox of 77% on 60% oxygen.  He has no icterus.  No   JVD.  No  bruits.  HEART:  Regular rate and rhythm.  LUNGS:  Clear with diffuse crackles on the left side.  EXTREMITIES:  No edema was noted.    LABORATORY EVALUATION:  Revealed the patient had a white count of 2.7 thousand,   H and H of 7.9 and 24.2, platelet count of 28,000 and he had glucose, sodium of   136, potassium 4.1, chloride 102, carbon dioxide 27, BUN 14, creatinine 1.1,   glucose 144.  His calcium 7.6.  Albumin 2.2, AST of 146, ALT of 17.    IMAGING DATA:  He had a chest x-ray done which showed patchy opacities   throughout the lungs bilaterally, greater on the right side.  He had a CT scan   of the chest, abdomen and pelvis done.  There is bilateral pleural effusion with   complete consolidation of the left lower lobe with patchy consolidation in the   right lower lobe also.  The patient had a right upper lobe and right middle lobe   pneumonia also noted.    IMPRESSION:  1.  Respiratory failure secondary to pneumonia.  2.  Severe pancytopenia, most likely from the pneumonia and/or other causes.  3.  Chronic hepatitis C infection.  4.  Hypertension.  5.  GI bleeding, most likely from the low platelets.    PLAN:  The patient will be transfused 2 units of blood.  I will give him some   platelets.  We will start him on a Protonix drip.  We will follow the patient   with you and will do necessary GI workup when the patient is stable enough.    Thank you Dr. Johnson for giving us the opportunity in involving Mr. Dwight Davey'   care.      STR/IN  dd: 02/18/2018 11:15:32 (CST)  td: 02/18/2018 13:46:43 (CST)  Doc ID   #[id number]  Job ID #[id number]    CC:

## 2018-02-18 NOTE — ASSESSMENT & PLAN NOTE
Pt met criteria for sepsis given fever, tachycardia, leukopenia, and pneumonia  Continue antibiotics as per ID  Will follow up on cultures

## 2018-02-18 NOTE — ASSESSMENT & PLAN NOTE
Initially treated for CAP  Likely to be aspiration pneumonia complicated by immunocompromised state  Antibiotics broadened recently by ID

## 2018-02-18 NOTE — NURSING
Results for GEENA BENJAMIN (MRN [id number]) as of 2/17/2018 18:07   Ref. Range 2/17/2018 16:19   POC PH Latest Ref Range: 7.35 - 7.45  7.300 (L)   POC PCO2 Latest Ref Range: 35 - 45 mmHg 57.2 (HH)   POC PO2 Latest Ref Range: 80 - 100 mmHg 130 (H)   POC BE Latest Ref Range: -2 to 2 mmol/L 1   POC HCO3 Latest Ref Range: 24 - 28 mmol/L 28.1 (H)   POC SATURATED O2 Latest Ref Range: 95 - 100 % 99   POC TCO2 Latest Ref Range: 23 - 27 mmol/L 30 (H)   Flow Unknown 15   Sample Unknown ARTERIAL   DelSys Unknown NRB   Allens Test Unknown Pass   Site Unknown RR   Mode Unknown SPONT     Dr. Espinal notified of above ABGs. New orders received and acknowledged for ICU transfer and continuous BIPAP. House Supervisor notified.  Report called to ICU JONNATHAN Ashby per SATHYA Cortez LPN. Patient transferred to Room 271 via hospital bed with assistance from transport staff.

## 2018-02-18 NOTE — PROGRESS NOTES
"[facility] Hospital Medicine  Progress Note    Patient Name: Dwight Bolton  MRN: [id number]  Patient Class: IP- Inpatient   Admission Date: 2/12/2018  Length of Stay: 6 days  Attending Physician: Mable Johnson MD  Primary Care Provider: Primary Doctor No        Subjective:     Principal Problem:Acute respiratory failure with hypoxia and hypercarbia    HPI:  Mr. Dwight Bolton is a 57 y.o. male with essential hypertension who presents to [facility] ED with complaints of fall today.  He has been feeling weak "for a while" and is always short of breath but says that he came here because he had a fall today.  He denies any loss of consciousness, lightheadedness, nor did he sustain any head trauma.  He denies any antecedent chest pain or palpitations, but he does say that he is always short of breath and it's not any worse than usual.  He denies any fever, chills, nausea, vomiting, nor headaches.  He decided to come here when he couldn't stand back up.  He is otherwise a very poor historian.    Hospital Course:  Mr. Bolton is a homeless man who presented with weakness and falling, and admitted with LLL pneumonia treated with Rocephin/Azithromycin. He was septic with persistent fever and pancytopenia. Workup remarkable for Hep C and he is HIV negative. He never complained of hemoptysis, but given persistent fever, TB workup was done with AFB smears negative thus far and PPD negative. Quantiferon gold pending. Pancytopenia workup in progress by Hem/Onc with low retic count suspicious for marrow failure and he was given Granix with improvement in his neutropenia. ID consulted with antibiotic regimen just changed to Cefepime on 2/17 and continued on Vancomycin started on 2/14, and Azithromycin restarted on 2/17. Pt was on respiratory isolation. Transferred to the ICU on 2/17 for continued respiratory decompensation on BIPAP.    Interval History: Pt transferred to the ICU yesterday on BIPAP. Continued to have " increased work of breathing and decreased level of consciousness. Rectal tube placed with return of blood and noted excoriation to rectum.     Review of Systems   Unable to perform ROS: Acuity of condition     Objective:     Vital Signs (Most Recent):  Temp: 98.5 °F (36.9 °C) (02/18/18 0715)  Pulse: 104 (02/18/18 0920)  Resp: (!) 22 (02/18/18 0920)  BP: 129/64 (02/18/18 0915)  SpO2: 98 % (02/18/18 0920) Vital Signs (24h Range):  Temp:  [97.7 °F (36.5 °C)-103.8 °F (39.9 °C)] 98.5 °F (36.9 °C)  Pulse:  [104-142] 104  Resp:  [20-48] 22  SpO2:  [77 %-99 %] 98 %  BP: ()/(54-83) 129/64     Weight: 106.8 kg (235 lb 7.2 oz)  Body mass index is 36.88 kg/m².    Intake/Output Summary (Last 24 hours) at 02/18/18 0927  Last data filed at 02/18/18 0600   Gross per 24 hour   Intake             1010 ml   Output             1250 ml   Net             -240 ml      Physical Exam   Constitutional: He appears well-developed. He appears distressed.   Dishoveled and minimally responsive   HENT:   Head: Normocephalic and atraumatic.   BIPAP mask in place   Eyes: Conjunctivae are normal. Right eye exhibits no discharge. Left eye exhibits no discharge.   Neck: Normal range of motion.   Cardiovascular: Normal rate and regular rhythm.  Exam reveals no gallop and no friction rub.    No murmur heard.  Tachycardic   Pulmonary/Chest: He is in respiratory distress.   Tachypnea. Course breath sounds that are diminished throughout, no wheezing, but with some rhonchi and + crackles at bases   Abdominal: Soft. Bowel sounds are normal.   Musculoskeletal: Normal range of motion. He exhibits edema.   1+ edema to LE   Neurological:   Minimally responsive on BIPAP   Skin: Skin is warm and dry. No erythema.   Psychiatric: He has a normal mood and affect. His behavior is normal. Judgment and thought content normal.   Nursing note and vitals reviewed.      Significant Labs: All pertinent labs within the past 24 hours have been reviewed.    Significant  "Imaging: I have reviewed and interpreted all pertinent imaging results/findings within the past 24 hours.    Assessment/Plan:      * Acute respiratory failure with hypoxia and hypercarbia    Pt with multilobar pneumonia, likely aspiration from the history  Immunocompromised state with neutropenia and malnutrition   Continued respiratory decompensation with increased work of breathing and increasing pCO2  Will intubate and place on vent  Current antibiotic regimen adequate at this time, and will discuss with ID  Unclear as to why the patient was on steroids, will d/c  Pulmonary consulted         Sepsis    Pt met criteria for sepsis given fever, tachycardia, leukopenia, and pneumonia  Continue antibiotics as per ID  Will follow up on cultures        Pancytopenia    Rapid HIV is negative  Hep C + with RNA pending, possible cause of low levels  Pt followed by Hem/Onc and s/p Granix for neutropenia  Possible marrow failure given low retic count  Platelets low enough and with active bleeding, will transfuse 1U of platelets  Will continue to monitor        GIB (gastrointestinal bleeding)    Pt with low platelets and bleeding from rectal tube after placement  Will transfuse 1U platelets  GI consulted  Will monitor with serial CBC        Chronic hepatitis C virus infection    Reportedly patient was aware of "cirrhosis" diagnosis but he does not reportedly drink ETOH  No previous report of Hep C until this admission with workup with hepatitis panel  Viral RNA pending  LFTs with mild elevation  Will check synthetic function of liver, CT scan with no gross abnormality seen in the liver  Will follow LFTs        Pneumonia of left lower lobe due to infectious organism    As discussed above        Essential hypertension    Blood pressure low normal, will hold all anti-HTN medication        Hypokalemia    Corrected to normal  Monitor        Aspiration pneumonia    Initially treated for CAP  Likely to be aspiration pneumonia " complicated by immunocompromised state  Antibiotics broadened recently by ID          VTE Risk Mitigation         Ordered     Medium Risk of VTE  Once      02/12/18 4378          Critical care time spent on the evaluation and treatment of severe organ dysfunction, review of pertinent labs and imaging studies, discussions with consulting providers and discussions with patient/family: 90 minutes.    Mable Johnson MD  Department of Hospital Medicine   [facility]

## 2018-02-18 NOTE — ANESTHESIA PROCEDURE NOTES
Intubation    Diagnosis: pneumonia  Doctor requesting consult: cortney  Patient location during procedure: ICU  Procedure start time: 2/18/2018 9:12 AM  Timeout: 2/18/2018 9:10 AM  Procedure end time: 2/18/2018 9:15 AM  Staffing  Anesthesiologist: RENETTA GUEVARA  Performed: anesthesiologist   Anesthesiologist was present at the time of the procedure.  Preanesthetic Checklist  Completed: patient identified, pre-op evaluation, timeout performed, IV checked and monitors and equipment checked  Intubation  Indication: respiratory failure, airway protection  Pre-oxygenation. Induction: intravenous rapid sequence,  Intubation: postinduction, Glidescope.  Endotracheal Tube: oral, 8.0 mm ID, uncuffed with leak < 5 cm H20  Attempts: 1, Grade I - full view of cords  Complicating Factors: none  Tube secured at 22 cm at the teeth.  Findings post-intubation: bilateral breath sounds, positive ETCO2, atraumatic / condition of teeth unchanged  Additional Notes  RSI performed and no return of gastric contents was seen. Pt has multiple missing teeth and prior to induction he had some blood apparent around his lips and nose but no trauma occurred during intubation and no fresh blood appeared during intubation. Eye care per unit routine. Pt's bp after intubation was 120's systolic so I gave a little extra propofol to keep him sedated until RN could hang propofol drip per primary team

## 2018-02-18 NOTE — NURSING
"0945- Attempted to notify emergency contact "Eliane Barragan" listed as patient's mother on emergency contact. There was no answer and no voicemail set up. Patient is unable to consent for self.   "

## 2018-02-18 NOTE — SUBJECTIVE & OBJECTIVE
Interval History: Pt transferred to the ICU yesterday on BIPAP. Continued to have increased work of breathing and decreased level of consciousness. Rectal tube placed with return of blood and noted excoriation to rectum.     Review of Systems   Unable to perform ROS: Acuity of condition     Objective:     Vital Signs (Most Recent):  Temp: 98.5 °F (36.9 °C) (02/18/18 0715)  Pulse: 104 (02/18/18 0920)  Resp: (!) 22 (02/18/18 0920)  BP: 129/64 (02/18/18 0915)  SpO2: 98 % (02/18/18 0920) Vital Signs (24h Range):  Temp:  [97.7 °F (36.5 °C)-103.8 °F (39.9 °C)] 98.5 °F (36.9 °C)  Pulse:  [104-142] 104  Resp:  [20-48] 22  SpO2:  [77 %-99 %] 98 %  BP: ()/(54-83) 129/64     Weight: 106.8 kg (235 lb 7.2 oz)  Body mass index is 36.88 kg/m².    Intake/Output Summary (Last 24 hours) at 02/18/18 0927  Last data filed at 02/18/18 0600   Gross per 24 hour   Intake             1010 ml   Output             1250 ml   Net             -240 ml      Physical Exam   Constitutional: He appears well-developed. He appears distressed.   Dishoveled and minimally responsive   HENT:   Head: Normocephalic and atraumatic.   BIPAP mask in place   Eyes: Conjunctivae are normal. Right eye exhibits no discharge. Left eye exhibits no discharge.   Neck: Normal range of motion.   Cardiovascular: Normal rate and regular rhythm.  Exam reveals no gallop and no friction rub.    No murmur heard.  Tachycardic   Pulmonary/Chest: He is in respiratory distress.   Tachypnea. Course breath sounds that are diminished throughout, no wheezing, but with some rhonchi and + crackles at bases   Abdominal: Soft. Bowel sounds are normal.   Musculoskeletal: Normal range of motion. He exhibits edema.   1+ edema to LE   Neurological:   Minimally responsive on BIPAP   Skin: Skin is warm and dry. No erythema.   Psychiatric: He has a normal mood and affect. His behavior is normal. Judgment and thought content normal.   Nursing note and vitals reviewed.      Significant Labs:  All pertinent labs within the past 24 hours have been reviewed.    Significant Imaging: I have reviewed and interpreted all pertinent imaging results/findings within the past 24 hours.

## 2018-02-18 NOTE — ASSESSMENT & PLAN NOTE
Rapid HIV is negative  Hep C + with RNA pending, possible cause of low levels  Pt followed by Hem/Onc and s/p Granix for neutropenia  Possible marrow failure given low retic count  Platelets low enough and with active bleeding, will transfuse 1U of platelets  Will continue to monitor

## 2018-02-18 NOTE — ASSESSMENT & PLAN NOTE
Pt with low platelets and bleeding from rectal tube after placement  Will transfuse 1U platelets  GI consulted  Will monitor with serial CBC

## 2018-02-18 NOTE — PROGRESS NOTES
ABG drawn and notified Dr. Johnson patient intubated at this time with 8.0 Et tube at 22cm. PRVC 20  Peep 5 Fio2 100%

## 2018-02-18 NOTE — NURSING
02/17/18 1600 02/17/18 1604 02/17/18 1617   Vital Signs   Pulse (!) 140 (!) 135 (!) 142   Heart Rate Source --  Monitor --    Resp (!) 36 (!) 36 (!) 36   SpO2 (!) 77 % (!) 80 % 98 %   Flow (L/min) 3 --  --    Oxygen Concentration (%) --  --  100   O2 Device (Oxygen Therapy) nasal cannula nasal cannula Non Rebreather Mask   BP --  (!) 165/78 (!) 185/83   MAP (mmHg) --  112 118   BP Location --  Right arm --    Patient Position --  Lying Lying       Patient returned from scheduled CT Scan. Patient with noted respiratory distress as evidence by tachypnea, hypoxia, and use of accessory muscles. Patient lethargic, non verbal at this time. Dr. Espinal notified. Patient placed on 100% Non-re breather for oxygenation. New orders received for ABGs. Respiratory notified. Lasix 20 mg given IVP as ordered.

## 2018-02-18 NOTE — CONSULTS
"[facility]  Pulmonology  Consult Note    Patient Name: Dwight Bolton  MRN: [id number]  Admission Date: 2/12/2018  Hospital Length of Stay: 6 days  Code Status: Full Code  Attending Physician: Mable Johnson MD  Primary Care Provider: Primary Doctor No   Principal Problem: Acute respiratory failure with hypoxia and hypercarbia    Inpatient consult to Pulmonology  Consult performed by: BLAKE GARZON  Consult ordered by: MILES BUTLER        Subjective:     HPI:  Mr. Dwight Bolton is a 57 y.o. male with essential hypertension who presents to [facility] ED with complaints of fall today.  He has been feeling weak "for a while" and is always short of breath but says that he came here because he had a fall today.  He denies any loss of consciousness, lightheadedness, nor did he sustain any head trauma.  He denies any antecedent chest pain or palpitations, but he does say that he is always short of breath and it's not any worse than usual.  He denies any fever, chills, nausea, vomiting, nor headaches.  He decided to come here when he couldn't stand back up.  He is otherwise a very poor historian.     Hospital Course:  Mr. Bolton is a homeless man who presented with weakness and falling, and admitted with LLL pneumonia treated with Rocephin/Azithromycin. He was septic with persistent fever and pancytopenia. Workup remarkable for Hep C and he is HIV negative. He never complained of hemoptysis, but given persistent fever, TB workup was done with AFB smears negative thus far and PPD negative. Quantiferon gold pending. Pancytopenia workup in progress by Hem/Onc with low retic count suspicious for marrow failure and he was given Granix with improvement in his neutropenia. ID consulted with antibiotic regimen just changed to Cefepime on 2/17 and continued on Vancomycin started on 2/14, and Azithromycin restarted on 2/17. Pt was on respiratory isolation. Transferred to the ICU on 2/17 for continued " respiratory decompensation on BIPAP.    Pulmonary consulted for hypoxic respiratory failure and ventilator management. Patient is currently intubated and sedated. Hx as above taken from patient's chart.         Review of Systems   Unable to perform ROS: Acuity of condition     Objective:     Vital Signs (Most Recent):  Temp: 98.3 °F (36.8 °C) (02/18/18 1629)  Pulse: 80 (02/18/18 1629)  Resp: (!) 24 (02/18/18 1629)  BP: (!) 93/56 (02/18/18 1629)  SpO2: 95 % (02/18/18 1629) Vital Signs (24h Range):  Temp:  [98.3 °F (36.8 °C)-103.8 °F (39.9 °C)] 98.3 °F (36.8 °C)  Pulse:  [] 80  Resp:  [20-48] 24  SpO2:  [87 %-100 %] 95 %  BP: ()/(48-77) 93/56     Weight: 106.8 kg (235 lb 7.2 oz)  Body mass index is 36.88 kg/m².    Intake/Output Summary (Last 24 hours) at 02/18/18 1714  Last data filed at 02/18/18 1551   Gross per 24 hour   Intake           1854.5 ml   Output             1485 ml   Net            369.5 ml      Physical Exam   Constitutional: He appears well-developed. He appears distressed.   Dishoveled and minimally responsive   HENT:   Head: Normocephalic and atraumatic.   BIPAP mask in place   Eyes: Conjunctivae are normal. Right eye exhibits no discharge. Left eye exhibits no discharge.   Neck: Normal range of motion.   Cardiovascular: Normal rate and regular rhythm.  Exam reveals no gallop and no friction rub.    No murmur heard.  Tachycardic   Pulmonary/Chest: No respiratory distress.   Intubated.   Abdominal: Soft. Bowel sounds are normal.   Musculoskeletal: Normal range of motion. He exhibits edema.   1+ edema to LE   Neurological:   Sedated   Skin: Skin is warm and dry. No erythema.   Psychiatric: He has a normal mood and affect. His behavior is normal. Judgment and thought content normal.   Nursing note and vitals reviewed.      Significant Labs: All pertinent labs within the past 24 hours have been reviewed.    Significant Imaging: I have reviewed and interpreted all pertinent imaging  results/findings within the past 24 hours.    Assessment/Plan:     * Acute respiratory failure with hypoxia and hypercarbia    Intubated for worsening respiratory distress. Multilobar PNA. Most likely aspiration PNA. Patient with significant ventilator requirements. Patient has active bleeding with low platelets. P/F ration around 150. Would not prone due to bleeding.   -Low Vt ventilation.   -Wean Vent according to [employer] protocol  -Daily awakening trial.   -On Vanc and Unasyn.   -Follow up cultures.         GIB (gastrointestinal bleeding)    PPI. GI following. Transfuse as needed. PRBCs and Platelets(count < 50k)        Sepsis    On levophed. Shock likely secondary to PNA. Trend lactate. On broad spectrum Abx. Follow up cultures.           Critical Care time: 35 minutes. Excluding procedures.     Critical Care time for the evaluation and treatment of severe organ dysfunction, review of pertinent labs and imaging studies, and discussions with primary team.       Thank you for your consult. I will follow-up with patient. Please contact us if you have any additional questions.     Ivan Esquivel MD  Pulmonology  [facility] [place]

## 2018-02-18 NOTE — NURSING
"1142- Attempted again to contact "Eliane Barragan" on emergency contact. No answer and no voicemail box available.   "

## 2018-02-18 NOTE — SUBJECTIVE & OBJECTIVE
Review of Systems   Unable to perform ROS: Acuity of condition     Objective:     Vital Signs (Most Recent):  Temp: 98.3 °F (36.8 °C) (02/18/18 1629)  Pulse: 80 (02/18/18 1629)  Resp: (!) 24 (02/18/18 1629)  BP: (!) 93/56 (02/18/18 1629)  SpO2: 95 % (02/18/18 1629) Vital Signs (24h Range):  Temp:  [98.3 °F (36.8 °C)-103.8 °F (39.9 °C)] 98.3 °F (36.8 °C)  Pulse:  [] 80  Resp:  [20-48] 24  SpO2:  [87 %-100 %] 95 %  BP: ()/(48-77) 93/56     Weight: 106.8 kg (235 lb 7.2 oz)  Body mass index is 36.88 kg/m².    Intake/Output Summary (Last 24 hours) at 02/18/18 1714  Last data filed at 02/18/18 1551   Gross per 24 hour   Intake           1854.5 ml   Output             1485 ml   Net            369.5 ml      Physical Exam   Constitutional: He appears well-developed. He appears distressed.   Dishoveled and minimally responsive   HENT:   Head: Normocephalic and atraumatic.   BIPAP mask in place   Eyes: Conjunctivae are normal. Right eye exhibits no discharge. Left eye exhibits no discharge.   Neck: Normal range of motion.   Cardiovascular: Normal rate and regular rhythm.  Exam reveals no gallop and no friction rub.    No murmur heard.  Tachycardic   Pulmonary/Chest: No respiratory distress.   Intubated.   Abdominal: Soft. Bowel sounds are normal.   Musculoskeletal: Normal range of motion. He exhibits edema.   1+ edema to LE   Neurological:   Sedated   Skin: Skin is warm and dry. No erythema.   Psychiatric: He has a normal mood and affect. His behavior is normal. Judgment and thought content normal.   Nursing note and vitals reviewed.      Significant Labs: All pertinent labs within the past 24 hours have been reviewed.    Significant Imaging: I have reviewed and interpreted all pertinent imaging results/findings within the past 24 hours.

## 2018-02-18 NOTE — PLAN OF CARE
Problem: Patient Care Overview  Goal: Plan of Care Review  Outcome: Ongoing (interventions implemented as appropriate)  Plan of care reviewed. No falls, skin assessed. Remains in ICU on BIPAP, FIO2 increased to 70% due to decreasing O2 sat. Adventitious lung sounds auscultated,MD aware, steroid and xray ordered. Abx given as ordered. TMAX 103.8 rectally, MD notified, IV tylenol given, temp now 98.2. Remains sinus tachy on monitor. Flexi seal inserted due to constant loose watery stools and excoriation to buttocks. Awaiting AM lab draw. Turned Q2 hours. Remains nonverbal, not following commands. Martinez draining adequate clear yellow urine.

## 2018-02-18 NOTE — PLAN OF CARE
Problem: Patient Care Overview  Goal: Plan of Care Review  Outcome: Ongoing (interventions implemented as appropriate)  Admitted to ICU at 1700 for respiratory distress and tachycardia. -140. Temp 101.5 Sats 93% on 100% NRB. Placed on BiPAP 60%. Repeat ABG at 1900. Nonverbal, stiff, tremors, not answering questions or following commands. Incontinence associated dermatitis to groin and sacrum. Martinez placed. Lasix given on floor.

## 2018-02-19 PROBLEM — N17.9 ACUTE RENAL FAILURE: Status: ACTIVE | Noted: 2018-01-01

## 2018-02-19 PROBLEM — E44.0 MALNUTRITION OF MODERATE DEGREE: Status: ACTIVE | Noted: 2018-01-01

## 2018-02-19 NOTE — EICU
eICU Note : Ventilator Rounds Vent day #1  Input/Output:+0029  CXR: CHF    On Mechanical ventilator :settings:   Mode: PRVC     RR 20  PEEP +10 FiO2  70%  Mean AP 16.9  SaO2 :  Last AB.32/54/153/28/99  Checked Readiness to wean: P/F ratio   PEEP   Sedation on/off: fentanyl and propofol    Problem: 57 y/o male with Septic shock and Aspiration Pneumonia on Mechanical ventilator     Pertinent History and labs reviewed :57 y/o male with  H/o HTN, Aspiration Pneumonia, Sepsis , Septic Shock      Treatment /Intervention given:SBT in AM        Saundra Gorman M.D  eICU Physician  12:58 AM  Vanc Level 45.8 , hold next Vanco dose. Pharmacy consult requested for next Vancomycin  dose   6:07 AM  Vanco level 41, recheck in 12 hrs

## 2018-02-19 NOTE — SUBJECTIVE & OBJECTIVE
Interval History: Pt with no acute events; no more reported bleeding from rectal tube.  Pt was transfused 1U platelets and 1U PRBC. Central line placed by Pulm and still on pressor support with levophed.    Review of Systems   Unable to perform ROS: Intubated     Objective:     Vital Signs (Most Recent):  Temp: 97.8 °F (36.6 °C) (02/19/18 1200)  Pulse: 77 (02/19/18 1400)  Resp: (!) 21 (02/19/18 1400)  BP: (!) 101/58 (02/19/18 1400)  SpO2: (!) 93 % (02/19/18 1400) Vital Signs (24h Range):  Temp:  [97.5 °F (36.4 °C)-98.6 °F (37 °C)] 97.8 °F (36.6 °C)  Pulse:  [70-93] 77  Resp:  [21-37] 21  SpO2:  [93 %-98 %] 93 %  BP: ()/(52-69) 101/58     Weight: 106.8 kg (235 lb 7.2 oz)  Body mass index is 36.88 kg/m².    Intake/Output Summary (Last 24 hours) at 02/19/18 1506  Last data filed at 02/19/18 1200   Gross per 24 hour   Intake          2651.32 ml   Output             1265 ml   Net          1386.32 ml      Physical Exam   Constitutional: He appears well-developed.   Dishoveled   HENT:   Head: Normocephalic and atraumatic.   ET tube in place   Eyes: Conjunctivae are normal. Right eye exhibits no discharge. Left eye exhibits no discharge.   Neck: Normal range of motion.   Cardiovascular: Normal rate and regular rhythm.  Exam reveals no gallop and no friction rub.    No murmur heard.  Tachycardic   Pulmonary/Chest:   Course ventilated breath sounds that are diminished throughout, some rhonchi and + crackles at bases   Abdominal: Soft. Bowel sounds are normal.   Musculoskeletal: Normal range of motion. He exhibits edema.   1+ edema to LE   Neurological:   Sedated on vent   Skin: Skin is warm and dry. No erythema.   Psychiatric: He has a normal mood and affect. His behavior is normal. Judgment and thought content normal.   Nursing note and vitals reviewed.      Significant Labs: All pertinent labs within the past 24 hours have been reviewed.    Significant Imaging: I have reviewed and interpreted all pertinent imaging  Refill readiness for nexavar - vosevi - rbv confirmed with patient; name/ confirmed; no missed doses; no new medications; no side effects noted; address confirmed for  shipment and 1/3 delivery    STONEY Rubin.Ph.  Clinical Pharmacist  [facility]  Phone: [phone]       results/findings within the past 24 hours.

## 2018-02-19 NOTE — ASSESSMENT & PLAN NOTE
Pt with multilobar pneumonia, likely aspiration from the history  Immunocompromised state with neutropenia and malnutrition   Continued respiratory decompensation with increased work of breathing and increasing pCO2  Intubated and place on vent on 2/18  Unclear as to why the patient was on steroids, therefore, stopped on 2/18  Antibiotics changed to ampicillin-sulbactam 3g IV Q6 as per ID on 2/18  Pulmonary following and managing vent  Pt still requiring PEEP of 10 and FIO2 70% but moving in the right directions

## 2018-02-19 NOTE — PROGRESS NOTES
ABG results are as follows   Results for GEENA BENJAMIN (MRN [id number]) as of 2/19/2018 05:44   Ref. Range 2/19/2018 05:22   POC PH Latest Ref Range: 7.35 - 7.45  7.470 (H)   POC PCO2 Latest Ref Range: 35 - 45 mmHg 33.6 (L)   POC PO2 Latest Ref Range: 80 - 100 mmHg 69 (L)   POC BE Latest Ref Range: -2 to 2 mmol/L 1   POC HCO3 Latest Ref Range: 24 - 28 mmol/L 24.5   POC SATURATED O2 Latest Ref Range: 95 - 100 % 95   POC TCO2 Latest Ref Range: 23 - 27 mmol/L 26   FiO2 Unknown 70   Vt Unknown 450   PEEP Unknown 10   Sample Unknown ARTERIAL   DelSys Unknown Adult Vent   Allens Test Unknown Pass   Site Unknown LR   Mode Unknown AC/PRVC

## 2018-02-19 NOTE — ASSESSMENT & PLAN NOTE
"Reportedly patient was aware of "cirrhosis" diagnosis but he does not reportedly drink ETOH  No previous report of Hep C until this admission with workup with hepatitis panel  Viral RNA pending  LFTs with mild elevation  Synthetic function of liver normal, and CT scan with no gross abnormality seen in the liver  Will follow LFTs  "

## 2018-02-19 NOTE — CONSULTS
[facility] [place]  Adult Nutrition  Consult Note    SUMMARY     Recommendations    Recommendation/Intervention:   1. Rec TF of Peptamen Bariatric initiated @ 10 ml/hr and advanced 10 ml q 6 hrs to goal rate of 50 ml/hr.  - Fluid flushes for normal intake: 135 ml q 4 hrs or per MD   -TF to provide: 1200 kcal, 110g pro, 1008 ml fluid (1802 kcal with propofol/dextrose).    -Hold for residuals >250 ml or sx/o n/v/abd discomfort (consider prokinetic with elevated residuals); HOB >30  2. RD to monitor    Goals: Initiate nutrition within 48 hrs  Nutrition Goal Status: new  Communication of RD Recs: discussed on rounds    Continuum of Care Plan    Referral to Outpatient Services:  (D/C planning: Too soon to determine)    Reason for Assessment    Reason for Assessment: physician consult  Diagnosis:  (septic shock; respiratory faillure; aspiration)  Relevent Medical History: HTN   Interdisciplinary Rounds: attended     General Information Comments: Patient intubated. GI reports rectal bleeding resolved. Plan for EGD when clinically improved. Propofol running. MD would like to initiate TF.  Patient appears obese.     Nutrition Prescription Ordered    Current Diet Order: NPO      Evaluation of Received Nutrients/Fluid Intake     Other Calories (kcal): 602 (528 propofol; 81 piggyback dextrose)      Energy Calories Required: not meeting needs   Protein Required: not meeting needs     I/O: 3163/1150   Fluid Required: meeting needs  Comments: LBM: 2/17     Nutrition Risk Screen     Nutrition Risk Screen: no indicators present    Nutrition/Diet History     Meal/Snack Patterns: CLEMENTE   Factors Affecting Nutritional Intake: NPO, on mechanical ventilation     Labs/Tests/Procedures/Meds     Pertinent Labs Reviewed: reviewed  Pertinent Labs Comments: alb 2.0; GFR 43 (worse)  Pertinent Medications Reviewed: reviewed  Pertinent Medications Comments: propofol, abx, fentanyl, protonix    Physical Findings    Overall Physical  "Appearance: obese, on ventilator support  Tubes: orogastric tube  Oral/Mouth Cavity: tooth/teeth missing  Skin: dermatitis (of spine)    Anthropometrics    Temp: 98.2 °F (36.8 °C)     Height: 5' 7" (170.2 cm)  Weight Method: Bed Scale  Weight: 106.8 kg (235 lb 7.2 oz)  Ideal Body Weight (IBW), Male: 148 lb     % Ideal Body Weight, Male (lb): 159.09 lb     BMI (Calculated): 37  BMI Grade: 35 - 39.9 - obesity - grade II     Estimated/Assessed Needs    Weight Used For Calorie Calculations: 106.8 kg (235 lb 7.2 oz)      Energy Calorie Requirements (kcal): 1897 (6671-7282)  Energy Need Method: [facility] (modified)     RMR (Eidson-St. Jeor Equation): 1836.62      Weight Used For Protein Calculations: 67 kg (147 lb 11.3 oz) (IBW)  Protein Requirements: 105-135g     RDA Method (mL): 1897         Assessment and Plan    Nutrition Dx: Inadequate Energy Intake r/t mechanical ventilation as evidenced by NPO  Nutrition Dx Status: New      Monitor and Evaluation    Food and Nutrient Intake: energy intake, enteral nutrition intake  Food and Nutrient Adminstration: diet order, enteral and parenteral nutrition administration  Knowledge/Beliefs/Attitudes: food and nutrition knowledge/skill  Physical Activity and Function: nutrition-related ADLs and IADLs  Anthropometric Measurements: weight, weight change  Biochemical Data, Medical Tests and Procedures: electrolyte and renal panel  Nutrition-Focused Physical Findings: overall appearance    Nutrition Risk    Level of Risk:  (2 x week)    Nutrition Follow-Up    RD Follow-up?: Yes          "

## 2018-02-19 NOTE — PLAN OF CARE
Problem: Patient Care Overview  Goal: Plan of Care Review  Outcome: Ongoing (interventions implemented as appropriate)  Patient is in ICU on ventilator via endotracheal with Propofol and fentanyl for sedation. He appears in no distress at this time as evident by vital signs being within normal limits as well as no evidence of bleeding. He is unable to follow commands but does open his eyes and coughs with painful stimuli. He was given a free water bolus as indicated via orogastric tube. He is to begin tube feedings when made available per dietary. He has no family or friend contact this shift. He sustained no injury, fall or trauma this shift.

## 2018-02-19 NOTE — ASSESSMENT & PLAN NOTE
Pt with low platelets and bleeding from rectal tube after placement  S/p transfusion of 1U platelets on 2/18  S/p transfusion of 2U PRBC  No more active bleeding reported  On PPI gtt and GI following  Continue to monitor with serial CBC and transfuse as needed

## 2018-02-19 NOTE — PLAN OF CARE
Problem: Patient Care Overview  Goal: Plan of Care Review  Outcome: Ongoing (interventions implemented as appropriate)  Plan of care reviewed. Pt remains in ICU on vent. Propofol and fentanyl gtt infusing for sedation. Martinez draining adequate clear yellow urine. Minimal output per flexiseal. Protonix gtt infusing. ABX given as scheduled. VANCo trough 45.8, MD aware, Vanc held and frequency reduced. TLC site continues to slowly bleed, site monitored. Levo remains off. Afebrile. Not following commands. VSS will continue to monitor.

## 2018-02-19 NOTE — ASSESSMENT & PLAN NOTE
Rapid HIV is negative  Hep C + with RNA pending, possible cause of low levels  Pt followed by Hem/Onc and s/p Granix for neutropenia x 1  Possible marrow failure given low retic count  Platelets low and with active bleeding s/p transfusion 1U of platelets on 2/18  S/p transfusion of 2U PRBC on 2/18  Continue to monitor

## 2018-02-19 NOTE — ASSESSMENT & PLAN NOTE
Likely due to prerenal etiology with hypoperfusion in setting of septic shock  Pt getting IVF and s/p transfusion  Will monitor

## 2018-02-19 NOTE — PROGRESS NOTES
The patient remains intubated and not on pressors.  He did not have anymore rectal bleeding.  The patient did not receive platelets, according to the nurse.    Vitals:    02/19/18 0300 02/19/18 0400 02/19/18 0500 02/19/18 0522   BP: (!) 88/52 (!) 98/58 98/60 101/60   Pulse: 81 73 71 72   Resp: (!) 31 (!) 24 (!) 24 (!) 24   Temp:  98.2 °F (36.8 °C)     TempSrc:  Axillary     SpO2: 95% 95% (!) 94% (!) 94%   Weight:       Height:          albuterol-ipratropium 2.5mg-0.5mg/3mL  3 mL Nebulization Q6H    ampicillin-sulbactim (UNASYN) IVPB  3 g Intravenous Q6H    chlorhexidine  15 mL Mouth/Throat BID    pneumoc 13-serafin conj-dip cr(PF)  0.5 mL Intramuscular Once     P.E.:  GEN: Intubated, NAD  HEENT: PERRL, anicteric sclera  CV: RRR, no M/R/G  Chest: Coarse breath sounds bilaterally  Abdomen: soft, NTND, normoactive BS  Ext: No C/C/E. 2+ dorsalis pedis pulses B    Labs:  Recent Results (from the past 336 hour(s))   CBC auto differential    Collection Time: 02/19/18  4:00 AM   Result Value Ref Range    WBC 2.29 (L) 3.90 - 12.70 K/uL    Hemoglobin 8.4 (L) 14.0 - 18.0 g/dL    Hematocrit 24.5 (L) 40.0 - 54.0 %    Platelets 37 (LL) 150 - 350 K/uL   CBC auto differential    Collection Time: 02/18/18  8:06 PM   Result Value Ref Range    WBC 2.55 (L) 3.90 - 12.70 K/uL    Hemoglobin 9.1 (L) 14.0 - 18.0 g/dL    Hematocrit 26.3 (L) 40.0 - 54.0 %    Platelets 44 (L) 150 - 350 K/uL   CBC auto differential    Collection Time: 02/18/18  6:00 AM   Result Value Ref Range    WBC 2.75 (L) 3.90 - 12.70 K/uL    Hemoglobin 7.9 (L) 14.0 - 18.0 g/dL    Hematocrit 24.2 (L) 40.0 - 54.0 %    Platelets 28 (LL) 150 - 350 K/uL     CMP  Sodium   Date Value Ref Range Status   02/19/2018 138 136 - 145 mmol/L Final     Potassium   Date Value Ref Range Status   02/19/2018 3.7 3.5 - 5.1 mmol/L Final     Chloride   Date Value Ref Range Status   02/19/2018 103 95 - 110 mmol/L Final     CO2   Date Value Ref Range Status   02/19/2018 24 23 - 29 mmol/L Final      Glucose   Date Value Ref Range Status   02/19/2018 125 (H) 70 - 110 mg/dL Final     BUN, Bld   Date Value Ref Range Status   02/19/2018 28 (H) 6 - 20 mg/dL Final     Creatinine   Date Value Ref Range Status   02/19/2018 1.7 (H) 0.5 - 1.4 mg/dL Final     Calcium   Date Value Ref Range Status   02/19/2018 7.6 (L) 8.7 - 10.5 mg/dL Final     Total Protein   Date Value Ref Range Status   02/19/2018 5.4 (L) 6.0 - 8.4 g/dL Final     Albumin   Date Value Ref Range Status   02/19/2018 2.0 (L) 3.5 - 5.2 g/dL Final     Total Bilirubin   Date Value Ref Range Status   02/19/2018 0.5 0.1 - 1.0 mg/dL Final     Comment:     For infants and newborns, interpretation of results should be based  on gestational age, weight and in agreement with clinical  observations.  Premature Infant recommended reference ranges:  Up to 24 hours.............<8.0 mg/dL  Up to 48 hours............<12.0 mg/dL  3-5 days..................<15.0 mg/dL  6-29 days.................<15.0 mg/dL       Alkaline Phosphatase   Date Value Ref Range Status   02/19/2018 40 (L) 55 - 135 U/L Final     AST   Date Value Ref Range Status   02/19/2018 115 (H) 10 - 40 U/L Final     ALT   Date Value Ref Range Status   02/19/2018 19 10 - 44 U/L Final     Anion Gap   Date Value Ref Range Status   02/19/2018 11 8 - 16 mmol/L Final     eGFR if    Date Value Ref Range Status   02/19/2018 50 (A) >60 mL/min/1.73 m^2 Final     eGFR if non    Date Value Ref Range Status   02/19/2018 43 (A) >60 mL/min/1.73 m^2 Final     Comment:     Calculation used to obtain the estimated glomerular filtration  rate (eGFR) is the CKD-EPI equation.            Recent Labs  Lab 02/19/18  0400   INR 1.0       A/P:  The patient is a 58 year old man with a history of HTN presenting with a fall, hepatitis C, pancytopenia, a left lower lobe pneumonia, and rectal bleeding.  1.  Rectal Bleeding - he had some rectal bleeding when a rectal tube was placed.  The patient has  pancytopenia, for which HemOnc believes he may have bone marrow failure.  The nurse states that he did not have anymore rectal bleeding, but rather bleeding from his triple lumen site.  Testing for tuberculosis has been negative so far, but a quantiferon gold level is pending.  He can be conservatively managed for now.  Endoscopic procedures will be differed until he is more stable.    Gastroenterology Addendum:  The patient was discussed with Dr. Johnson and he did receive platelets.

## 2018-02-19 NOTE — PROGRESS NOTES
[facility]  Hematology/Oncology  Progress Note    Patient Name: Dwight Bolton  Admission Date: 2018  Hospital Length of Stay: 7 days  Code Status: Full Code     Subjective:     Interval History:     2019: developed respiratory failure and septic shock leading to icu admission. Off Granix   2018: states feeling better. Fever improving        Oncology Treatment Plan:/   [No treatment plan]    Medications:  Continuous Infusions:   fentanyl 5 mL/hr at 18 06    norepinephrine bitartrate-D5W Stopped (18 1604)    norepinephrine bitartrate-D5W Stopped (18 1205)    pantoprazole 40 mg in dextrose 5 % 100 mL infusion (ready to mix system) 8 mg/hr (18)    propofol 20 mcg/kg/min (18)     Scheduled Meds:   albuterol-ipratropium 2.5mg-0.5mg/3mL  3 mL Nebulization Q6H    ampicillin-sulbactim (UNASYN) IVPB  3 g Intravenous Q6H    chlorhexidine  15 mL Mouth/Throat BID    pneumoc 13-serafin conj-dip cr(PF)  0.5 mL Intramuscular Once     PRN Meds:sodium chloride, sodium chloride, acetaminophen, albuterol-ipratropium 2.5mg-0.5mg/3mL, cloNIDine, [] fentaNYL **FOLLOWED BY** fentaNYL, gelatin adsorbable 100cm top sponge, influenza, ondansetron, promethazine (PHENERGAN) IVPB, ramelteon, senna-docusate 8.6-50 mg     Review of Systems     Unable to obtain- pt in icu on a vent     Objective:     Vital Signs (Most Recent):  Temp: 98.2 °F (36.8 °C) (18 0400)  Pulse: 72 (1800)  Resp: (!) 24 (18)  BP: (!) 99/59 (18)  SpO2: (!) 94 % (18) Vital Signs (24h Range):  Temp:  [98 °F (36.7 °C)-98.9 °F (37.2 °C)] 98.2 °F (36.8 °C)  Pulse:  [] 72  Resp:  [20-47] 24  SpO2:  [94 %-100 %] 94 %  BP: ()/(48-69) 99/59     Weight: 106.8 kg (235 lb 7.2 oz)  Body mass index is 36.88 kg/m².  Body surface area is 2.25 meters squared.      Intake/Output Summary (Last 24 hours) at 18 0729  Last data filed at 18  0600   Gross per 24 hour   Intake          3163.49 ml   Output             1040 ml   Net          2123.49 ml       Physical Exam    General: In ICU on a vent.   Head: normocephalic, atraumatic   Eyes: conjunctivae pale    Throat: ET tube   Neck: supple, no LAD   Lungs: rhonchi and crackles bilaterally, left > R  Heart: S1, S2, RR   Abdomen: + BS x 4 quadrants, soft, non tender.   Extremities: no cyanosis or edema.   Skin: turgor normal, no erythema or rashes.  MS: move all extremities  Neuro: A&O x 2  Psy: disheveled     Significant Labs:   CBC:     Recent Labs  Lab 02/18/18  0600 02/18/18 2006 02/19/18  0400   WBC 2.75* 2.55* 2.29*   HGB 7.9* 9.1* 8.4*   HCT 24.2* 26.3* 24.5*   PLT 28* 44* 37*   , CMP:     Recent Labs  Lab 02/18/18  0600 02/19/18  0400    138   K 4.1 3.7    103   CO2 27 24   * 125*   BUN 14 28*   CREATININE 1.1 1.7*   CALCIUM 7.6* 7.6*   PROT 6.0 5.4*   ALBUMIN 2.2* 2.0*   BILITOT 0.3 0.5   ALKPHOS 49* 40*   * 115*   ALT 17 19   ANIONGAP 7* 11   EGFRNONAA >60 43*   , Coagulation:     Recent Labs  Lab 02/19/18  0400   INR 1.0   , Haptoglobin: No results for input(s): HAPTOGLOBIN in the last 48 hours., Immunology: No results for input(s): SPEP, MINAL, LUCIEN, FREELAMBDALI in the last 48 hours., LDH: No results for input(s): LDHCSF, BFSOURCE in the last 48 hours., Reticulocytes: No results for input(s): RETIC in the last 48 hours., Tumor Markers: No results for input(s): PSA, CEA, , AFPTM, [id number],  in the last 48 hours.    Invalid input(s): ALGTM, Uric Acid No results for input(s): URICACID in the last 48 hours. and Urine Studies: No results for input(s): COLORU, APPEARANCEUA, PHUR, SPECGRAV, PROTEINUA, GLUCUA, KETONESU, BILIRUBINUA, OCCULTUA, NITRITE, UROBILINOGEN, LEUKOCYTESUR, RBCUA, WBCUA, BACTERIA, SQUAMEPITHEL, HYALINECASTS in the last 48 hours.    Invalid input(s): JUANITASUDEMETRIS    Diagnostic Results:      Assessment/Plan:     Active Diagnoses:    Diagnosis Date  Noted POA    PRINCIPAL PROBLEM:  Acute respiratory failure with hypoxia and hypercarbia [J96.01, J96.02] 02/18/2018 Yes    GIB (gastrointestinal bleeding) [K92.2] 02/18/2018 Yes    Septic shock [A41.9, R65.21] 02/18/2018 Unknown    Chronic hepatitis C virus infection [B18.2] 02/15/2018 Yes    Pneumonia of left lower lobe due to infectious organism [J18.1] 02/14/2018 Yes    Aspiration pneumonia [J69.0] 02/12/2018 Yes    Sepsis [A41.9] 02/12/2018 Yes    Pancytopenia [D61.818] 02/12/2018 Yes    Hypokalemia [E87.6] 02/12/2018 Yes    Essential hypertension [I10] 02/12/2018 Yes     Chronic      Problems Resolved During this Admission:    Diagnosis Date Noted Date Resolved POA       Mr. Bolton, 56 YO man with possible CAP found to have pancytopenia. Low albumin, elevated AST      1. Pancytopenia: multifactorial including infection, malnutrition.               - worsening wbc- ANC    - Neutropenic isolation              - Granix 300 mcg daily until ANC > 1500: Discontinued               - s/p prbc transfusion               - elevated  LDH              - Hep C +   - pt has inappropriately low retic- pt has underlying bone marrow pathology   - may need BM bx- can be done out pt once acute infectious issues resolves   - also Hep C causing cytopenia- especially low platelets   - discussed with Dr. Johnson         2. CAP: Rocephin and Zithromax    - now with sepsis    3. Acute hypoxic respiratory failure       Anthony Ramachandran M.D  Internal Medicine & Geriatric Medicine  Hematology & Oncology  Palliative Medicine    [address] [place] [address], [address]  [place] [id number] [phone] (Office)  [phone] (Fax)

## 2018-02-19 NOTE — PROGRESS NOTES
Received pt on noted vent settings. Alarms are set and functional. Ambu bag at hob. Oral ett measured at the 26 cm iftikhar moved to center. +BBS clear. Hme changed. Pt tolerated vent well; no distress noted at that time

## 2018-02-19 NOTE — PROGRESS NOTES
02/19/18 0758   Patient Assessment/Suction   Level of Consciousness (AVPU) responds to pain   Respiratory Effort Normal;Unlabored   Expansion/Accessory Muscles/Retractions expansion symmetric   All Lung Fields Breath Sounds coarse   Rhythm/Pattern, Respiratory artificial airway;ventilator assisted   Cough Frequency with stimulation   Cough Type productive;good   Suction Method in-line suction catheter (closed)   Sputum Amount small   Sputum Color white   Sputum Consistency thick   PRE-TX-O2-ETCO2   O2 Device (Oxygen Therapy) ventilator   Oxygen Concentration (%) 70   SpO2 (!) 94 %   Pulse Oximetry Type Continuous   $ Pulse Oximetry - Multiple Charge Pulse Oximetry - Multiple   Pulse 71   Resp (!) 21   Aerosol Therapy   $ Aerosol Therapy Charges Aerosol Treatment   Respiratory Treatment Status given   SVN/Inhaler Treatment Route in-line   Position During Treatment HOB at 30 degrees   Patient Tolerance good   Post-Treatment   Post-treatment Heart Rate (beats/min) 85   Post-treatment Resp Rate (breaths/min) 21   All Fields Breath Sounds unchanged       Airway - Non-Surgical 02/18/18 0914 Endotracheal Tube   Placement Date/Time: 02/18/18 0914   Present Prior to Hospital Arrival?: No  Method of Intubation: Direct laryngoscopy  Inserted by: Anesthesia MD  Staff/Resident Name(s): MD Kwesi  Airway Device: Endotracheal Tube  Intubated: Postinduction  Airway ...   Secured at 26 cm   Measured At Lips   Secured Location Left   Secured by Commercial tube tao   Bite Block none   Site Condition Cool;Dry   Status Intact;Secured;Patent   Site Assessment Clean;Dry   Cuff Pressure 26 cm H2O   Vent Select   Conventional Vent Y   Humidification Changed? Yes   $ Ventilator Subsequent 1   Charged w/in last 24h YES   Preset Conventional Ventilator Settings   Vent ID [id number]   Vent Type Servo i   Vent Mode PRVC   Humidity HME   Set Rate 21 bmp   Vt Set 450 mL   PEEP/CPAP 10 cmH20   Insp Time 0.9 Sec(s)   Insp Rise Time  0.15 %   Patient  Ventilator Parameters   Resp Rate Total 21 br/min   Peak Airway Pressure 30.5 cmH2O   Mean Airway Pressure 16.4 cmH20   Exhaled Vt 434 mL   Total Ve 9.1 mL   Conventional Ventilator Alarms   Alarms On Y   Ve High Alarm 40 L/min   Ve Low Alarm 2 L/min   Resp Rate High Alarm 45 br/min   Resp Rate Low Alarm 5   Ready to Wean/Extubation Screen   FIO2<=50 (chart decimal) (!) 0.7   MV<16L (chart vol.) 9.1   PEEP <=8 (chart #) (!) 10   Ready to Wean Parameters   F/VT Ratio<105 (RSBI) (!) 48.39   Airway Safety   Ambu bag with the patient? Yes, Adult Ambu   Is mask with the patient? Yes, Adult Mask

## 2018-02-19 NOTE — ASSESSMENT & PLAN NOTE
Pt met criteria for sepsis given fever, tachycardia, leukopenia, and pneumonia  Continue antibiotics as per ID  Requiring pressor support with levophed  Blood cultures from 2/12 negative final,  from 2/14 NGTD and from 2/18 NGTD  Wean off pressor support as tolerated

## 2018-02-19 NOTE — PLAN OF CARE
Recommendations     Recommendation/Intervention:   1. Rec TF of Peptamen Bariatric initiated @ 10 ml/hr and advanced 10 ml q 6 hrs to goal rate of 50 ml/hr.  - Fluid flushes for normal intake: 135 ml q 4 hrs or per MD   -TF to provide: 1200 kcal, 110g pro, 1008 ml fluid (1802 kcal with propofol/dextrose).    -Hold for residuals >250 ml or sx/o n/v/abd discomfort (consider prokinetic with elevated residuals); HOB >30  2. RD to monitor     Goals: Initiate nutrition within 48 hrs  Nutrition Goal Status: new  Communication of RD Recs: discussed on rounds     Continuum of Care Plan     Referral to Outpatient Services:  (D/C planning: Too soon to determine)

## 2018-02-19 NOTE — CONSULTS
REQUESTING PHYSICIAN:  Shandra Shepherd MD    REASON FOR CONSULTATION:  Fever of unclear etiology.    HISTORY OF PRESENT ILLNESS:  This is a 57-year-old male who presented to the ER   who had had a fall.  The patient stated he is a little bit short of breath.  He   also says that he wanted his  checked out.   Unfortunately, the patient is a   poor historian.  It is difficult to get a very definitive history from him.  The   patient was admitted to the hospital, on chest x-ray there it is what appeared   to have been a left lower lobe consolidative process.  The patient was started   on ceftriaxone, azithromycin and consequently was also started to be worked up   for mycobacterium tuberculosis.  The patient has been having spiking fevers over   the course of the last couple of days along with some pancytopenia.  We have   been asked to see the patient for further antibiotic recommendations.    REVIEW OF SYSTEMS:  Unable to be done given the patient is unable to give it to   us.    PAST MEDICAL HISTORY:  Hypertension.    PAST SURGICAL HISTORY:  No surgical history.    ALLERGIES:  No known allergies.    FAMILY HISTORY:  Not noted.    SOCIAL HISTORY:  The patient smokes 1 pack per day.  No alcohol or drugs.    MEDICATIONS:  The patient is currently on cefepime, vancomycin, furosemide and   albuterol, ipratropium.    PHYSICAL EXAMINATION:  GENERAL:  This is a somewhat disheveled appearing male who appears older than   his stated age.  VITAL SIGNS:  First set of vital signs show a temperature of 37.9, pulse of 112,   respiratory rate of 20, blood pressure 110/64.  HEENT:  Oral mucosa moist.  No thrush or leukoplakia.  NECK:  Supple with no lymphadenopathy.  LUNGS:  Rhonchorous and wheezy bilaterally.  HEART:  Regular.  Positive S1, S2.  ABDOMEN:  Soft, nontender.  EXTREMITIES:  Without edema.  CARDIOVASCULAR:  2+ radial pulse bilaterally.  Capillary refill less than 2   seconds.  No carotid bruits.  NEUROLOGIC:  He  is alert, somewhat is able to follow commands, but slowly is   able to move extremities spontaneously.  SKIN:  There is no lesion on exam.    LABORATORY DATA:  Most recent set of labs show a white count 3.35, hemoglobin   7.9, platelets of 41, BUN of 8, creatinine of 0.8, bicarb of 24, AST of 106, ALT   of 18.  Vancomycin trough 17 today.  Hepatitis panel is positive or notable for   hep C antibody positive.  HIV negative.  Influenza negative.  UA was   essentially negative as well.  Legionella urine antigen was negative.  The   patient has several AFBs.  The patient had one AFB that is currently negative.    The PPD is currently negative as well.    IMAGING:  Reviewed in the narrative.    IMPRESSION:  This is a 57-year-old male with fever of unclear etiology with Hep   C antibody likely to be chronic hepatitis C, who was admitted for what appears   to be a community acquired pneumonia, pancytopenia, now has fevers of unclear   etiology.    RECOMMENDATIONS:  Continue with vancomycin and cefepime.  We will ask pharmacy   to please help manage the vancomycin troughs.  In the meanwhile, please obtain a   CT of the chest, abdomen and pelvis for any infectious diseases or neoplastic   evaluation.  I am unclear as to why he is getting MTB workup underway.  However,   we will continue to follow with you.    Thank you kindly for allowing me to participate in the care of the patient.        AMANDA  dd: 02/16/2018 14:23:13 (CST)  td: 02/17/2018 00:18:31 (CST)  Doc ID   #[id number]  Job ID #[id number]    CC: Shandra Shepherd M.D.

## 2018-02-19 NOTE — PROGRESS NOTES
"[facility] Hospital Medicine  Progress Note    Patient Name: Dwight Bolton  MRN: [id number]  Patient Class: IP- Inpatient   Admission Date: 2/12/2018  Length of Stay: 7 days  Attending Physician: Mable Johnson MD  Primary Care Provider: Primary Doctor No        Subjective:     Principal Problem:Acute respiratory failure with hypoxia and hypercarbia    HPI:  Mr. Dwight Bolton is a 57 y.o. male with essential hypertension who presents to [facility] ED with complaints of fall today.  He has been feeling weak "for a while" and is always short of breath but says that he came here because he had a fall today.  He denies any loss of consciousness, lightheadedness, nor did he sustain any head trauma.  He denies any antecedent chest pain or palpitations, but he does say that he is always short of breath and it's not any worse than usual.  He denies any fever, chills, nausea, vomiting, nor headaches.  He decided to come here when he couldn't stand back up.  He is otherwise a very poor historian.    Hospital Course:  Mr. Bolton is a homeless man who presented with weakness and falling, and admitted with LLL pneumonia treated with Rocephin/Azithromycin. He was septic with persistent fever and pancytopenia. Workup remarkable for Hep C+ and he is HIV negative. He never complained of hemoptysis, but given persistent fever, TB workup was done with AFB smears negative thus far and PPD negative. Quantiferon gold pending. Pancytopenia workup in progress by Hem/Onc with low retic count suspicious for marrow failure and he was given Granix with improvement in his neutropenia. ID consulted with antibiotic regimen changed to Cefepime on 2/17 and continued on Vancomycin started on 2/14, and Azithromycin restarted on 2/17. Pt was on respiratory isolation. Transferred to the ICU on 2/17 for continued respiratory decompensation on BIPAP. On 2/18, he failed BIPAP and had to be intubated and placed on vent. Rectal tube returned blood " and he was transfused 1U platelets and 2U PRBC as well and GI was consulted. Central line was placed and he required pressor support with levophed.    Interval History: Pt with no acute events; no more reported bleeding from rectal tube.  Pt was transfused 1U platelets and 1U PRBC. Central line placed by Pulm and still on pressor support with levophed.    Review of Systems   Unable to perform ROS: Intubated     Objective:     Vital Signs (Most Recent):  Temp: 97.8 °F (36.6 °C) (02/19/18 1200)  Pulse: 77 (02/19/18 1400)  Resp: (!) 21 (02/19/18 1400)  BP: (!) 101/58 (02/19/18 1400)  SpO2: (!) 93 % (02/19/18 1400) Vital Signs (24h Range):  Temp:  [97.5 °F (36.4 °C)-98.6 °F (37 °C)] 97.8 °F (36.6 °C)  Pulse:  [70-93] 77  Resp:  [21-37] 21  SpO2:  [93 %-98 %] 93 %  BP: ()/(52-69) 101/58     Weight: 106.8 kg (235 lb 7.2 oz)  Body mass index is 36.88 kg/m².    Intake/Output Summary (Last 24 hours) at 02/19/18 1506  Last data filed at 02/19/18 1200   Gross per 24 hour   Intake          2651.32 ml   Output             1265 ml   Net          1386.32 ml      Physical Exam   Constitutional: He appears well-developed.   Dishoveled   HENT:   Head: Normocephalic and atraumatic.   ET tube in place   Eyes: Conjunctivae are normal. Right eye exhibits no discharge. Left eye exhibits no discharge.   Neck: Normal range of motion.   Cardiovascular: Normal rate and regular rhythm.  Exam reveals no gallop and no friction rub.    No murmur heard.  Tachycardic   Pulmonary/Chest:   Course ventilated breath sounds that are diminished throughout, some rhonchi and + crackles at bases   Abdominal: Soft. Bowel sounds are normal.   Musculoskeletal: Normal range of motion. He exhibits edema.   1+ edema to LE   Neurological:   Sedated on vent   Skin: Skin is warm and dry. No erythema.   Psychiatric: He has a normal mood and affect. His behavior is normal. Judgment and thought content normal.   Nursing note and vitals reviewed.      Significant  Labs: All pertinent labs within the past 24 hours have been reviewed.    Significant Imaging: I have reviewed and interpreted all pertinent imaging results/findings within the past 24 hours.    Assessment/Plan:      * Acute respiratory failure with hypoxia and hypercarbia    Pt with multilobar pneumonia, likely aspiration from the history  Immunocompromised state with neutropenia and malnutrition   Continued respiratory decompensation with increased work of breathing and increasing pCO2  Intubated and place on vent on 2/18  Unclear as to why the patient was on steroids, therefore, stopped on 2/18  Antibiotics changed to ampicillin-sulbactam 3g IV Q6 as per ID on 2/18  Pulmonary following and managing vent  Pt still requiring PEEP of 10 and FIO2 70% but moving in the right directions        Septic shock    Pt met criteria for sepsis given fever, tachycardia, leukopenia, and pneumonia  Continue antibiotics as per ID  Requiring pressor support with levophed  Blood cultures from 2/12 negative final,  from 2/14 NGTD and from 2/18 NGTD  Wean off pressor support as tolerated        Pancytopenia    Rapid HIV is negative  Hep C + with RNA pending, possible cause of low levels  Pt followed by Hem/Onc and s/p Granix for neutropenia x 1  Possible marrow failure given low retic count  Platelets low and with active bleeding s/p transfusion 1U of platelets on 2/18  S/p transfusion of 2U PRBC on 2/18  Continue to monitor        Sepsis    As discussed above        Malnutrition of moderate degree    Discussed with GI  Will start tube feedings today        Acute renal failure    Likely due to prerenal etiology with hypoperfusion in setting of septic shock  Pt getting IVF and s/p transfusion  Will monitor        GIB (gastrointestinal bleeding)    Pt with low platelets and bleeding from rectal tube after placement  S/p transfusion of 1U platelets on 2/18  S/p transfusion of 2U PRBC  No more active bleeding reported  On PPI gtt and GI  "following  Continue to monitor with serial CBC and transfuse as needed        Chronic hepatitis C virus infection    Reportedly patient was aware of "cirrhosis" diagnosis but he does not reportedly drink ETOH  No previous report of Hep C until this admission with workup with hepatitis panel  Viral RNA pending  LFTs with mild elevation  Synthetic function of liver normal, and CT scan with no gross abnormality seen in the liver  Will follow LFTs        Pneumonia of left lower lobe due to infectious organism    As discussed above        Essential hypertension    All anti-HTN medications on hold due to shock        Hypokalemia    Corrected to normal  Monitor        Aspiration pneumonia    Initially treated for CAP  Likely to be aspiration pneumonia complicated by immunocompromised state  Antibiotics broadened recently by ID          VTE Risk Mitigation         Ordered     Medium Risk of VTE  Once      02/12/18 2212          Critical care time spent on the evaluation and treatment of severe organ dysfunction, review of pertinent labs and imaging studies, discussions with consulting providers and discussions with patient/family: 60 minutes.    Mable Johnson MD  Department of Hospital Medicine   [facility]  "

## 2018-02-20 NOTE — SUBJECTIVE & OBJECTIVE
Interval History: WBC and plts grossly unchanged but still low. Hgb < 7 g/dL and possibly maroon colored liquid stool in flexi seal. Off vasopressor support. Still with high O2 requirements.     Review of Systems   Unable to perform ROS: Intubated     Objective:     Vital Signs (Most Recent):  Temp: 100.1 °F (37.8 °C) (02/20/18 1500)  Pulse: 93 (02/20/18 1515)  Resp: 17 (02/20/18 1515)  BP: (!) 169/77 (02/20/18 1500)  SpO2: 95 % (02/20/18 1515) Vital Signs (24h Range):  Temp:  [99.4 °F (37.4 °C)-102 °F (38.9 °C)] 100.1 °F (37.8 °C)  Pulse:  [] 93  Resp:  [20-34] 25  SpO2:  [70 %-97 %] 95 %  BP: ()/(56-77) 169/77     Weight: 106.8 kg (235 lb 7.2 oz)  Body mass index is 36.88 kg/m².    Intake/Output Summary (Last 24 hours) at 02/20/18 1629  Last data filed at 02/20/18 1500   Gross per 24 hour   Intake          2114.82 ml   Output             3275 ml   Net         -1160.18 ml      Physical Exam   Constitutional: He appears well-developed.   Disheveled, unkept   HENT:   Head: Normocephalic and atraumatic.   ET tube in place   Eyes: Conjunctivae are normal. Right eye exhibits no discharge. Left eye exhibits no discharge.   Neck: Normal range of motion.   Cardiovascular: Normal rate and regular rhythm.  Exam reveals no gallop and no friction rub.    No murmur heard.  Pulmonary/Chest:   Course ventilated breath sounds that are diminished throughout, some rhonchi and + crackles at bases   Abdominal: Soft. Bowel sounds are normal.   Musculoskeletal: Normal range of motion. He exhibits edema.   1+ edema throughtout   Neurological:   Sedated on vent   Skin: Skin is warm and dry. No erythema.   Burn marks on palmar aspect left thumb   Psychiatric: He has a normal mood and affect. His behavior is normal. Judgment and thought content normal.   Nursing note and vitals reviewed.      Significant Labs: All pertinent labs within the past 24 hours have been reviewed.    Significant Imaging: I have reviewed and interpreted  all pertinent imaging results/findings within the past 24 hours.

## 2018-02-20 NOTE — SUBJECTIVE & OBJECTIVE
Interval History: On Levophed this morning. Still on fair amount of ventilator support.     Review of Systems   Unable to perform ROS: Intubated     Objective:     Vital Signs (Most Recent):  Temp: 97.7 °F (36.5 °C) (02/19/18 1600)  Pulse: 77 (02/19/18 1930)  Resp: (!) 21 (02/19/18 1930)  BP: 108/62 (02/19/18 1800)  SpO2: (!) 93 % (02/19/18 1930) Vital Signs (24h Range):  Temp:  [97.5 °F (36.4 °C)-98.3 °F (36.8 °C)] 97.7 °F (36.5 °C)  Pulse:  [70-93] 77  Resp:  [21-37] 21  SpO2:  [92 %-98 %] 93 %  BP: ()/(52-69) 108/62     Weight: 106.8 kg (235 lb 7.2 oz)  Body mass index is 36.88 kg/m².    Intake/Output Summary (Last 24 hours) at 02/19/18 1956  Last data filed at 02/19/18 1800   Gross per 24 hour   Intake          1652.92 ml   Output             1300 ml   Net           352.92 ml      Physical Exam   Constitutional: He appears well-developed.   Dishoveled   HENT:   Head: Normocephalic and atraumatic.   ET tube in place   Eyes: Conjunctivae are normal. Right eye exhibits no discharge. Left eye exhibits no discharge.   Neck: Normal range of motion.   Cardiovascular: Normal rate and regular rhythm.  Exam reveals no gallop and no friction rub.    No murmur heard.  Tachycardic   Pulmonary/Chest:   Course ventilated breath sounds that are diminished throughout, some rhonchi and + crackles at bases   Abdominal: Soft. Bowel sounds are normal.   Musculoskeletal: Normal range of motion. He exhibits edema.   1+ edema to LE   Neurological:   Sedated on vent   Skin: Skin is warm and dry. No erythema.   Psychiatric: He has a normal mood and affect. His behavior is normal. Judgment and thought content normal.   Nursing note and vitals reviewed.      Significant Labs: All pertinent labs within the past 24 hours have been reviewed.    Significant Imaging: I have reviewed and interpreted all pertinent imaging results/findings within the past 24 hours.

## 2018-02-20 NOTE — ASSESSMENT & PLAN NOTE
Pt met criteria for sepsis given fever, tachycardia, leukopenia, and pneumonia, likely aspiration  Continue antibiotics as per ID  Weaned off vasopressor support  Blood cultures from 2/12 negative final,  from 2/14 NGTD and from 2/18 NGTD

## 2018-02-20 NOTE — ASSESSMENT & PLAN NOTE
Discussed with GI  hold tube feedings as GI bleed has not been ruled out and anemia had worsened once again. Flexi seal bag with maroon colored liquid stool. PPI changed to BID

## 2018-02-20 NOTE — ASSESSMENT & PLAN NOTE
Off vasopressors.Shock likely secondary to PNA. Trend lactate. On broad spectrum Abx. Follow up cultures.

## 2018-02-20 NOTE — PROGRESS NOTES
"[facility] Hospital Medicine  Progress Note    Patient Name: Dwight Bolton  MRN: [id number]  Patient Class: IP- Inpatient   Admission Date: 2/12/2018  Length of Stay: 8 days  Attending Physician: Miriam Horan MD  Primary Care Provider: Primary Doctor No        Subjective:     Principal Problem:Acute respiratory failure with hypoxia and hypercarbia    HPI:  Mr. Dwight Bolton is a 57 y.o. male with essential hypertension who presents to [facility] ED with complaints of fall today.  He has been feeling weak "for a while" and is always short of breath but says that he came here because he had a fall today.  He denies any loss of consciousness, lightheadedness, nor did he sustain any head trauma.  He denies any antecedent chest pain or palpitations, but he does say that he is always short of breath and it's not any worse than usual.  He denies any fever, chills, nausea, vomiting, nor headaches.  He decided to come here when he couldn't stand back up.  He is otherwise a very poor historian.    Hospital Course:  Mr. Bolton is a homeless man who presented with weakness and falling, and admitted with LLL pneumonia treated with Rocephin/Azithromycin. He was septic with persistent fever and pancytopenia. Workup remarkable for Hep C+ and he is HIV negative. He never complained of hemoptysis, but given persistent fever, TB workup was done with AFB smears negative thus far and PPD negative. Quantiferon gold pending. Pancytopenia workup in progress by Hem/Onc with low retic count suspicious for marrow failure and he was given Granix with improvement in his neutropenia. ID consulted with antibiotic regimen changed to   Cefepime on 2/17 and continued on Vancomycin started on 2/14, and Azithromycin restarted on 2/17. Pt was on respiratory isolation. Transferred to the ICU on 2/17 for continued respiratory decompensation on BIPAP. On 2/18, he failed BIPAP and had to be intubated and placed on vent. Rectal tube returned " blood and he was transfused 1U platelets and 2U PRBC as well and GI was consulted. Central line was placed and he required pressor support with levophed.    Interval History: WBC and plts grossly unchanged but still low. Hgb < 7 g/dL and possibly maroon colored liquid stool in flexi seal. Off vasopressor support. Still with high O2 requirements.     Review of Systems   Unable to perform ROS: Intubated     Objective:     Vital Signs (Most Recent):  Temp: 100.1 °F (37.8 °C) (02/20/18 1500)  Pulse: 93 (02/20/18 1515)  Resp: 17 (02/20/18 1515)  BP: (!) 169/77 (02/20/18 1500)  SpO2: 95 % (02/20/18 1515) Vital Signs (24h Range):  Temp:  [99.4 °F (37.4 °C)-102 °F (38.9 °C)] 100.1 °F (37.8 °C)  Pulse:  [] 93  Resp:  [20-34] 25  SpO2:  [70 %-97 %] 95 %  BP: ()/(56-77) 169/77     Weight: 106.8 kg (235 lb 7.2 oz)  Body mass index is 36.88 kg/m².    Intake/Output Summary (Last 24 hours) at 02/20/18 1629  Last data filed at 02/20/18 1500   Gross per 24 hour   Intake          2114.82 ml   Output             3275 ml   Net         -1160.18 ml      Physical Exam   Constitutional: He appears well-developed.   Disheveled, unkept   HENT:   Head: Normocephalic and atraumatic.   ET tube in place   Eyes: Conjunctivae are normal. Right eye exhibits no discharge. Left eye exhibits no discharge.   Neck: Normal range of motion.   Cardiovascular: Normal rate and regular rhythm.  Exam reveals no gallop and no friction rub.    No murmur heard.  Pulmonary/Chest:   Course ventilated breath sounds that are diminished throughout, some rhonchi and + crackles at bases   Abdominal: Soft. Bowel sounds are normal.   Musculoskeletal: Normal range of motion. He exhibits edema.   1+ edema throughtout   Neurological:   Sedated on vent   Skin: Skin is warm and dry. No erythema.   Burn marks on palmar aspect left thumb   Psychiatric: He has a normal mood and affect. His behavior is normal. Judgment and thought content normal.   Nursing note and  vitals reviewed.      Significant Labs: All pertinent labs within the past 24 hours have been reviewed.    Significant Imaging: I have reviewed and interpreted all pertinent imaging results/findings within the past 24 hours.    Assessment/Plan:      * Acute respiratory failure with hypoxia and hypercarbia    Pt with multilobar pneumonia, likely aspiration from the history  Immunocompromised state with neutropenia and malnutrition. Unfortunately no tox screen obtained in the ED but has possible track marks on dorsum aspect of left hand and a burn without open wound on left thumb.    Continued respiratory decompensation with increased work of breathing and increasing pCO2  Intubated and place on vent on 2/18  Unclear as to why the patient was on steroids, therefore, stopped on 2/18  Antibiotics changed to ampicillin-sulbactam 3g IV Q6 as per ID on 2/18. vanc level still > 20  Pulmonary following and managing vent  Pt still requiring PEEP of 10 and FIO2 60%. Trial of IV lasix for hypervolemia. Transfuse PRBCs. No vasopressor support required at this time        Malnutrition of moderate degree    Discussed with GI  hold tube feedings as GI bleed has not been ruled out and anemia had worsened once again. Flexi seal bag with maroon colored liquid stool. PPI changed to BID        Acute renal failure    Likely due to prerenal etiology with hypoperfusion in setting of septic shock. Shock resolved and is not on vasopressor support  Stop IVF as patient is hypervolemic. Trial of lasix. Strict I/Os  Will monitor        Septic shock    Pt met criteria for sepsis given fever, tachycardia, leukopenia, and pneumonia, likely aspiration  Continue antibiotics as per ID  Weaned off vasopressor support  Blood cultures from 2/12 negative final,  from 2/14 NGTD and from 2/18 NGTD          GIB (gastrointestinal bleeding)    Pt with low platelets and bleeding from rectal tube after placement  S/p transfusion of 1U platelets on 2/18  S/p  "transfusion of 2U PRBC  Anemia worsened and flexi seal bag appears to have maroon colored liquid stool. Change PPI to BID and transfuse PRBCs and platelets again.   GI following. Appreciate input          Chronic hepatitis C virus infection    Reportedly patient was aware of "cirrhosis" diagnosis but he does not reportedly drink ETOH  No previous report of Hep C until this admission with workup with hepatitis panel  Viral RNA of 75  LFTs with mild elevation  Synthetic function of liver normal, and CT scan with no gross abnormality seen in the liver  Will follow LFTs        Pneumonia of left lower lobe due to infectious organism    As discussed above        Essential hypertension    All anti-HTN medications for now.         Hypokalemia    Corrected to normal  Monitor        Pancytopenia    Rapid HIV is negative  Hep C + with RNA of 75. Also folate, iron and B12 deficient. Combination of these causing marrow failure?  Pt followed by Hem/Onc and s/p Granix for neutropenia x 1. Neutropenia improved since. ANC 1600  Platelets low and with active bleeding s/p transfusion 1U of platelets on 2/18  S/p transfusion of 2U PRBC on 2/18  Will transfuse both platelets and PRBCs again today as I believe he is having a GI bleed. Iron with PRBCs. Will add folic acid and B12 supplementation today.   Continue to monitor        Aspiration pneumonia    Initially treated for CAP  Likely to be aspiration pneumonia complicated by immunocompromised state  Antibiotics broadened recently by ID          VTE Risk Mitigation         Ordered     Medium Risk of VTE  Once      02/12/18 2212        Unfortunately unable to reach family for discussion of plan of care nor for obtaining consents. Patient is critically ill and with high O2 requirements from vent. Good UOP with trial of lasix. Will give PRBCs and platelets for anemia maybe 2/2 GI bleed. Abx per ID. Start B12 and folic acid supp.     Critical care time spent on the evaluation and treatment " of severe organ dysfunction, review of pertinent labs and imaging studies, discussions with consulting providers and discussions with patient/family: > 45 minutes.    Miriam Stephenson MD  Department of Hospital Medicine   [facility]

## 2018-02-20 NOTE — PROGRESS NOTES
02/20/18 0915   Patient Assessment/Suction   Level of Consciousness (AVPU) responds to pain   Respiratory Effort Unlabored   Expansion/Accessory Muscles/Retractions no retractions;no use of accessory muscles   All Lung Fields Breath Sounds coarse   Rhythm/Pattern, Respiratory pattern regular;unlabored   Cough Frequency infrequent   Suction Method required;endotracheal tube;oral   $ Suction Charges Inline Suction Procedure Stat Charge;Close Suction System Equipment   Sputum Amount moderate   Sputum Color yellow   Sputum Consistency thick   PRE-TX-O2-ETCO2   O2 Device (Oxygen Therapy) ventilator   Oxygen Concentration (%) 60   SpO2 (!) 92 %   Pulse Oximetry Type Continuous   $ Pulse Oximetry - Multiple Charge Pulse Oximetry - Multiple       Airway - Non-Surgical 02/18/18 0914 Endotracheal Tube   Placement Date/Time: 02/18/18 0914   Present Prior to Hospital Arrival?: No  Method of Intubation: Direct laryngoscopy  Inserted by: Anesthesia MD  Staff/Resident Name(s): MD Kwesi  Airway Device: Endotracheal Tube  Intubated: Postinduction  Airway ...   Secured at 26 cm   Measured At Lips   Secured Location Right   Secured by Commercial tube tao   Bite Block none   Site Condition Dry   Status Intact;Secured;Patent   Site Assessment Clean   Cuff Pressure 28 cm H2O   Equipment Change   Tube Securement Device Changed? Yes   Tube Securement Device Change Next Due 02/22/18   Vent Select   Conventional Vent Y   $ Ventilator Subsequent 1   Charged w/in last 24h YES   IHI Ventilator Associated Pneumonia Bundle   Head of Bed Elevated  HOB 30   Oral Care Mouth suctioned   Additional VAP Prevention Documentation Clean equipment maintained   Preset Conventional Ventilator Settings   Vent ID [id number]   Vent Type Servo i   Vent Mode PRVC   Humidity HME   Set Rate 20 bmp   Vt Set 450 mL   PEEP/CPAP 10 cmH20   Insp Time 0.9 Sec(s)   Insp Rise Time  0.15 %   Patient Ventilator Parameters   Resp Rate Total 25 br/min   Peak Airway Pressure 50.8  cmH2O   Mean Airway Pressure 23.1 cmH20   Exhaled Vt 23 mL   Total Ve 8.9 mL   Conventional Ventilator Alarms   Ve High Alarm 40 L/min   Ve Low Alarm 2 L/min   Resp Rate High Alarm 45 br/min   Resp Rate Low Alarm 5   Ready to Wean/Extubation Screen   FIO2<=50 (chart decimal) (!) 0.6   MV<16L (chart vol.) 8.9   PEEP <=8 (chart #) (!) 10   Labs   $ Was an ABG obtained? Arterial Puncture;ISTAT - Blood gas   $ Labs Tech Time 15 min   Critical Value Communication   Notified Physician/Designee    Date Result Received 02/20/18   Time Result Received 0915   Resulting Department of Critical Value resp   Who communicated critical value from resulting department? S.Sam   Critical Test #1 ph   Critical Test #1 Result 7.44   Critical Test #2 pco2   Critical Test #2 Result 37   Critical Test #3  po2   Critical Test #3 Result 63   Critical Test #4 be   Critical Test #4 Result 1   Critical Test #5 hco3   Critical Test #5 Result 25.5   Date Notified 02/20/18   Time Notified 0971

## 2018-02-20 NOTE — ASSESSMENT & PLAN NOTE
"Reportedly patient was aware of "cirrhosis" diagnosis but he does not reportedly drink ETOH  No previous report of Hep C until this admission with workup with hepatitis panel  Viral RNA of 75  LFTs with mild elevation  Synthetic function of liver normal, and CT scan with no gross abnormality seen in the liver  Will follow LFTs  "

## 2018-02-20 NOTE — ASSESSMENT & PLAN NOTE
Intubated for worsening respiratory distress. Multilobar PNA. Most likely aspiration PNA. Improved ventilator requirements from yesterday. On FiO2 70% and PEEP 10.   -Low Vt ventilation.   -Wean Vent according to [employer] protocol  -Daily awakening trial.   -On Vanc and Unasyn.   -Follow up cultures.

## 2018-02-20 NOTE — PROGRESS NOTES
[facility]  Hematology/Oncology  Progress Note    Patient Name: Dwight Bolton  Admission Date: 2018  Hospital Length of Stay: 8 days  Code Status: Full Code     Subjective:     Interval History:     2018: remained intubated in icu. +  fever   2019: developed respiratory failure and septic shock leading to icu admission. Off Granix   2018: states feeling better. Fever improving        Oncology Treatment Plan:/   [No treatment plan]    Medications:  Continuous Infusions:   fentanyl 5 mL/hr at 18 0600    norepinephrine bitartrate-D5W Stopped (18 1604)    norepinephrine bitartrate-D5W Stopped (18 1205)    pantoprazole 40 mg in dextrose 5 % 100 mL infusion (ready to mix system) 8 mg/hr (18)    propofol 34.956 mcg/kg/min (18)     Scheduled Meds:   albuterol-ipratropium 2.5mg-0.5mg/3mL  3 mL Nebulization Q6H    ampicillin-sulbactim (UNASYN) IVPB  3 g Intravenous Q6H    chlorhexidine  15 mL Mouth/Throat BID    pneumoc 13-serafin conj-dip cr(PF)  0.5 mL Intramuscular Once     PRN Meds:sodium chloride, sodium chloride, acetaminophen, albuterol-ipratropium 2.5mg-0.5mg/3mL, cloNIDine, [] fentaNYL **FOLLOWED BY** fentaNYL, gelatin adsorbable 100cm top sponge, influenza, ondansetron, promethazine (PHENERGAN) IVPB, ramelteon, senna-docusate 8.6-50 mg     Review of Systems     Unable to obtain- pt in icu on a vent     Objective:     Vital Signs (Most Recent):  Temp: (!) 102 °F (38.9 °C) (18 0413)  Pulse: 82 (1845)  Resp: (!) 24 (18)  BP: 109/60 (18 0630)  SpO2: (!) 92 % (18) Vital Signs (24h Range):  Temp:  [97.7 °F (36.5 °C)-102 °F (38.9 °C)] 102 °F (38.9 °C)  Pulse:  [] 82  Resp:  [20-37] 24  SpO2:  [91 %-98 %] 92 %  BP: ()/(52-72) 109/60     Weight: 106.8 kg (235 lb 7.2 oz)  Body mass index is 36.88 kg/m².  Body surface area is 2.25 meters squared.      Intake/Output Summary (Last 24  hours) at 02/20/18 0724  Last data filed at 02/20/18 0600   Gross per 24 hour   Intake          2323.63 ml   Output             1825 ml   Net           498.63 ml       Physical Exam    General: In ICU on a vent.   Head: normocephalic, atraumatic   Eyes: conjunctivae pale    Throat: ET tube   Neck: supple, no LAD   Lungs: rhonchi and crackles bilaterally, left > R  Heart: S1, S2, RR   Abdomen: + BS x 4 quadrants, soft, non tender.   Extremities: no cyanosis or edema.   Skin: turgor normal, no erythema or rashes.  MS: move all extremities  Neuro: A&O x 2  Psy: disheveled     Significant Labs:   CBC:     Recent Labs  Lab 02/18/18 2006 02/19/18 0400 02/20/18  0315   WBC 2.55* 2.29* 2.20*   HGB 9.1* 8.4* 6.6*   HCT 26.3* 24.5* 19.3*   PLT 44* 37* 38*   , CMP:     Recent Labs  Lab 02/19/18 0400 02/20/18  0315    138   K 3.7 3.7    103   CO2 24 26   * 116*   BUN 28* 42*   CREATININE 1.7* 1.8*   CALCIUM 7.6* 7.9*   PROT 5.4* 5.6*   ALBUMIN 2.0* 2.0*   BILITOT 0.5 0.5   ALKPHOS 40* 43*   * 118*   ALT 19 19   ANIONGAP 11 9   EGFRNONAA 43* 40*   , Coagulation:     Recent Labs  Lab 02/19/18 0400 02/20/18  0550   INR 1.0 1.0   , Haptoglobin: No results for input(s): HAPTOGLOBIN in the last 48 hours., Immunology: No results for input(s): SPEP, MINAL, LUCIEN, FREELAMBDALI in the last 48 hours., LDH: No results for input(s): LDHCSF, BFSOURCE in the last 48 hours., Reticulocytes: No results for input(s): RETIC in the last 48 hours., Tumor Markers: No results for input(s): PSA, CEA, , AFPTM, [id number],  in the last 48 hours.    Invalid input(s): ALGTM, Uric Acid No results for input(s): URICACID in the last 48 hours. and Urine Studies: No results for input(s): COLORU, APPEARANCEUA, PHUR, SPECGRAV, PROTEINUA, GLUCUA, KETONESU, BILIRUBINUA, OCCULTUA, NITRITE, UROBILINOGEN, LEUKOCYTESUR, RBCUA, WBCUA, BACTERIA, SQUAMEPITHEL, HYALINECASTS in the last 48 hours.    Invalid input(s): WRIGHTSUR    Diagnostic  Results:      Assessment/Plan:     Active Diagnoses:    Diagnosis Date Noted POA    PRINCIPAL PROBLEM:  Acute respiratory failure with hypoxia and hypercarbia [J96.01, J96.02] 02/18/2018 Yes    Acute renal failure [N17.9] 02/19/2018 Yes    Malnutrition of moderate degree [E44.0] 02/19/2018 Yes    GIB (gastrointestinal bleeding) [K92.2] 02/18/2018 Yes    Septic shock [A41.9, R65.21] 02/18/2018 Yes    Chronic hepatitis C virus infection [B18.2] 02/15/2018 Yes    Pneumonia of left lower lobe due to infectious organism [J18.1] 02/14/2018 Yes    Aspiration pneumonia [J69.0] 02/12/2018 Yes    Sepsis [A41.9] 02/12/2018 Yes    Pancytopenia [D61.818] 02/12/2018 Yes    Hypokalemia [E87.6] 02/12/2018 Yes    Essential hypertension [I10] 02/12/2018 Yes     Chronic      Problems Resolved During this Admission:    Diagnosis Date Noted Date Resolved POA       Mr. Bolton, 56 YO man with possible CAP found to have pancytopenia. Low albumin, elevated AST      1. Pancytopenia: multifactorial including infection, malnutrition.               - worsening wbc- ANC    - Neutropenic isolation              - Granix 300 mcg daily until ANC > 1500: Discontinued               - s/p prbc transfusion               - elevated  LDH              - Hep C +   - pt has inappropriately low retic- pt has underlying bone marrow pathology   - may need BM bx- can be done out pt once acute infectious issues resolves   - also Hep C causing cytopenia- especially low platelets   - Hg trending down again- may need 2 units of prbc if planning extubation          2. CAP: now with sepsis and resp failure   - has fever again   - recc to broaden coverage     3. Acute hypoxic respiratory failure - on vent    - pulmonary following       Anthony Ramachandran M.D  Internal Medicine & Geriatric Medicine  Hematology & Oncology  Palliative Medicine    [address], [address]  [place] [id number] [phone] (Office)  [phone] (Fax)

## 2018-02-20 NOTE — PROGRESS NOTES
02/20/18 1301   Patient Assessment/Suction   Level of Consciousness (AVPU) responds to pain   Respiratory Effort Unlabored   Expansion/Accessory Muscles/Retractions no retractions;no use of accessory muscles   All Lung Fields Breath Sounds coarse;diminished   Rhythm/Pattern, Respiratory artificial airway;ventilator assisted   Suction Method required;endotracheal tube   Sputum Amount small   Sputum Color white   Sputum Consistency thick   PRE-TX-O2-ETCO2   O2 Device (Oxygen Therapy) ventilator   Oxygen Concentration (%) 60   SpO2 (!) 89 %   Pulse Oximetry Type Continuous   $ Pulse Oximetry - Multiple Charge Pulse Oximetry - Multiple   Pulse 95   Resp 20   Aerosol Therapy   $ Aerosol Therapy Charges Aerosol Treatment   Respiratory Treatment Status given   SVN/Inhaler Treatment Route ET tube;in-line   Position During Treatment HOB at 30 degrees   Patient Tolerance good   Post-Treatment   Post-treatment Heart Rate (beats/min) 93   Post-treatment Resp Rate (breaths/min) 20   All Fields Breath Sounds unchanged       Airway - Non-Surgical 02/18/18 0914 Endotracheal Tube   Placement Date/Time: 02/18/18 0914   Present Prior to Hospital Arrival?: No  Method of Intubation: Direct laryngoscopy  Inserted by: Anesthesia MD  Staff/Resident Name(s): MD Kwesi  Airway Device: Endotracheal Tube  Intubated: Postinduction  Airway ...   Secured at 26 cm   Measured At Lips   Secured Location Center   Secured by Commercial tube tao   Bite Block none   Site Condition Dry   Status Intact;Secured;Patent   Site Assessment Dry   Respiratory Interventions   Respiratory Support airway management;breathing techniques promoted;HOB elevated;position of comfort   VAP Prevention HOB elevation maintained   Vent Select   Conventional Vent Y   Charged w/in last 24h YES   Preset Conventional Ventilator Settings   Vent ID [id number]   Vent Type Servo i   Vent Mode PRVC   Humidity HME   Set Rate 20 bmp   Vt Set 450 mL   PEEP/CPAP 10 cmH20   Insp Time 0.9  Sec(s)   Insp Rise Time  0.15 %   Patient Ventilator Parameters   Resp Rate Total 20 br/min   Peak Airway Pressure 33.1 cmH2O   Mean Airway Pressure 17.1 cmH20   Exhaled Vt 776 mL   Total Ve 15.6 mL   Conventional Ventilator Alarms   Ve High Alarm 40 L/min   Ve Low Alarm 2 L/min   Resp Rate High Alarm 45 br/min   Resp Rate Low Alarm 5   Ready to Wean/Extubation Screen   FIO2<=50 (chart decimal) (!) 0.6   MV<16L (chart vol.) 15.6   PEEP <=8 (chart #) (!) 10   Ready to Wean Parameters   F/VT Ratio<105 (RSBI) (!) 25.77

## 2018-02-20 NOTE — ASSESSMENT & PLAN NOTE
Intubated for worsening respiratory distress. Multilobar PNA. Most likely aspiration PNA. Improved ventilator requirements from yesterday. On FiO2 60% and PEEP 10.   -Low Vt ventilation.   -Wean Vent according to ARDSnet protocol  -Daily awakening trial.   -On Vanc and Unasyn. Abx per ID. Cultures negative so far.  -Consider diuresing patient. He has received a fair amount of blood products and IVF.

## 2018-02-20 NOTE — PROGRESS NOTES
02/20/18 0801   Patient Assessment/Suction   Level of Consciousness (AVPU) responds to pain   Respiratory Effort Unlabored   Expansion/Accessory Muscles/Retractions expansion symmetric;no retractions;no use of accessory muscles   All Lung Fields Breath Sounds coarse;wheezes, expiratory   Rhythm/Pattern, Respiratory artificial airway;ventilator assisted   Suction Method required;endotracheal tube   $ Suction Charges Inline Suction Procedure Stat Charge;Close Suction System Equipment   Sputum Amount small   Sputum Color yellow, pale   Sputum Consistency thick   PRE-TX-O2-ETCO2   O2 Device (Oxygen Therapy) ventilator   Oxygen Concentration (%) 60   SpO2 (!) 94 %   Pulse Oximetry Type Continuous   $ Pulse Oximetry - Multiple Charge Pulse Oximetry - Multiple   Pulse 87   Resp (!) 24   Aerosol Therapy   $ Aerosol Therapy Charges Aerosol Treatment   Respiratory Treatment Status given   SVN/Inhaler Treatment Route ET tube;in-line   Position During Treatment HOB at 30 degrees   Patient Tolerance good   Post-Treatment   Post-treatment Heart Rate (beats/min) 90   Post-treatment Resp Rate (breaths/min) 20   All Fields Breath Sounds unchanged       Airway - Non-Surgical 02/18/18 0914 Endotracheal Tube   Placement Date/Time: 02/18/18 0914   Present Prior to Hospital Arrival?: No  Method of Intubation: Direct laryngoscopy  Inserted by: Anesthesia MD  Staff/Resident Name(s): MD Kwesi  Airway Device: Endotracheal Tube  Intubated: Postinduction  Airway ...   Secured at 26 cm   Measured At Lips   Secured Location Right   Secured by Commercial tube tao   Bite Block none   Site Condition Dry   Status Intact;Secured;Patent   Site Assessment Clean;Dry   Respiratory Interventions   Airway/Ventilation Management airway patency maintained   Airway/Ventilation Management (Actively Dying Patient) comfort measures provided   Respiratory Support airway management;breathing techniques promoted;HOB elevated;position of comfort   VAP  Prevention HOB elevation maintained;oral care regularly provided   Vent Select   Conventional Vent Y   Charged w/in last 24h NO   IHI Ventilator Associated Pneumonia Bundle   Head of Bed Elevated  HOB 30   Oral Care Mouth suctioned   Additional VAP Prevention Documentation Clean equipment maintained   Preset Conventional Ventilator Settings   Vent ID [id number]   Vent Type Servo i   Vent Mode PRVC   Humidity HME   Set Rate 20 bmp   Vt Set 450 mL   PEEP/CPAP 10 cmH20   Insp Time 0.9 Sec(s)   Insp Rise Time  0.15 %   Patient Ventilator Parameters   Resp Rate Total 24 br/min   Peak Airway Pressure 27.1 cmH2O   Mean Airway Pressure 16.2 cmH20   Exhaled Vt 424 mL   Total Ve 10.6 mL   Conventional Ventilator Alarms   Ve High Alarm 40 L/min   Ve Low Alarm 2 L/min   Resp Rate High Alarm 45 br/min   Resp Rate Low Alarm 5   Ready to Wean/Extubation Screen   FIO2<=50 (chart decimal) (!) 0.6   MV<16L (chart vol.) 10.6   PEEP <=8 (chart #) (!) 10   Extubation Screen Passed? Failed   Ready to Wean Parameters   Cough Reflex? Yes   F/VT Ratio<105 (RSBI) (!) 56.6

## 2018-02-20 NOTE — ASSESSMENT & PLAN NOTE
Pt with low platelets and bleeding from rectal tube after placement  S/p transfusion of 1U platelets on 2/18  S/p transfusion of 2U PRBC  Anemia worsened and flexi seal bag appears to have maroon colored liquid stool. Change PPI to BID and transfuse PRBCs and platelets again.   GI following. Appreciate input

## 2018-02-20 NOTE — ASSESSMENT & PLAN NOTE
Likely due to prerenal etiology with hypoperfusion in setting of septic shock. Shock resolved and is not on vasopressor support  Stop IVF as patient is hypervolemic. Trial of lasix. Strict I/Os  Will monitor

## 2018-02-20 NOTE — PROGRESS NOTES
[facility]  Pulmonology  Progress Note    Patient Name: Dwight Bolton  MRN: [id number]  Admission Date: 2/12/2018  Hospital Length of Stay: 7 days  Code Status: Full Code  Attending Provider: Mable Johnson MD  Primary Care Provider: Primary Doctor No   Principal Problem: Acute respiratory failure with hypoxia and hypercarbia    Subjective:     Interval History: On Levophed this morning. Still on fair amount of ventilator support.     Review of Systems   Unable to perform ROS: Intubated     Objective:     Vital Signs (Most Recent):  Temp: 97.7 °F (36.5 °C) (02/19/18 1600)  Pulse: 77 (02/19/18 1930)  Resp: (!) 21 (02/19/18 1930)  BP: 108/62 (02/19/18 1800)  SpO2: (!) 93 % (02/19/18 1930) Vital Signs (24h Range):  Temp:  [97.5 °F (36.4 °C)-98.3 °F (36.8 °C)] 97.7 °F (36.5 °C)  Pulse:  [70-93] 77  Resp:  [21-37] 21  SpO2:  [92 %-98 %] 93 %  BP: ()/(52-69) 108/62     Weight: 106.8 kg (235 lb 7.2 oz)  Body mass index is 36.88 kg/m².    Intake/Output Summary (Last 24 hours) at 02/19/18 1956  Last data filed at 02/19/18 1800   Gross per 24 hour   Intake          1652.92 ml   Output             1300 ml   Net           352.92 ml      Physical Exam   Constitutional: He appears well-developed.   Dishoveled   HENT:   Head: Normocephalic and atraumatic.   ET tube in place   Eyes: Conjunctivae are normal. Right eye exhibits no discharge. Left eye exhibits no discharge.   Neck: Normal range of motion.   Cardiovascular: Normal rate and regular rhythm.  Exam reveals no gallop and no friction rub.    No murmur heard.  Tachycardic   Pulmonary/Chest:   Course ventilated breath sounds that are diminished throughout, some rhonchi and + crackles at bases   Abdominal: Soft. Bowel sounds are normal.   Musculoskeletal: Normal range of motion. He exhibits edema.   1+ edema to LE   Neurological:   Sedated on vent   Skin: Skin is warm and dry. No erythema.   Psychiatric: He has a normal mood and affect. His behavior is  normal. Judgment and thought content normal.   Nursing note and vitals reviewed.      Significant Labs: All pertinent labs within the past 24 hours have been reviewed.    Significant Imaging: I have reviewed and interpreted all pertinent imaging results/findings within the past 24 hours.    Assessment/Plan:     * Acute respiratory failure with hypoxia and hypercarbia    Intubated for worsening respiratory distress. Multilobar PNA. Most likely aspiration PNA. Improved ventilator requirements from yesterday. On FiO2 70% and PEEP 10.   -Low Vt ventilation.   -Wean Vent according to ARDSnet protocol  -Daily awakening trial.   -On Vanc and Unasyn.   -Follow up cultures.         GIB (gastrointestinal bleeding)    PPI. GI following. H/H stable.           Critical Care time: 35 minutes.     Critical Care time for th evaluation and treatment of severe organ dysfunction, review of pertinent labs and imaging studies and discussions with primary team.    Continue ICU care.        Ivan Esquivel MD  Pulmonology  [facility]

## 2018-02-20 NOTE — ASSESSMENT & PLAN NOTE
Pt with multilobar pneumonia, likely aspiration from the history  Immunocompromised state with neutropenia and malnutrition. Unfortunately no tox screen obtained in the ED but has possible track marks on dorsum aspect of left hand and a burn without open wound on left thumb.    Continued respiratory decompensation with increased work of breathing and increasing pCO2  Intubated and place on vent on 2/18  Unclear as to why the patient was on steroids, therefore, stopped on 2/18  Antibiotics changed to ampicillin-sulbactam 3g IV Q6 as per ID on 2/18. vanc level still > 20  Pulmonary following and managing vent  Pt still requiring PEEP of 10 and FIO2 60%. Trial of IV lasix for hypervolemia. Transfuse PRBCs. No vasopressor support required at this time

## 2018-02-20 NOTE — CARE UPDATE
Pt remainis in icu on vent settings prvc rr 21 vt 450 peep 10 fio2 70%.  All alarms are on and functioning.  Will continue to monitor pt.  Pt is without distress at this time.  ambu bag at [facility].

## 2018-02-20 NOTE — PROGRESS NOTES
[facility]  Pulmonology  Progress Note    Patient Name: Dwight Bolton  MRN: [id number]  Admission Date: 2/12/2018  Hospital Length of Stay: 8 days  Code Status: Full Code  Attending Provider: Miriam Horan MD  Primary Care Provider: Primary Doctor No   Principal Problem: Acute respiratory failure with hypoxia and hypercarbia    Subjective:     Interval History: Off vasopressors.  Still on fair amount of ventilator support.     Review of Systems   Unable to perform ROS: Intubated     Objective:     Vital Signs (Most Recent):  Temp: (!) 101 °F (38.3 °C) (02/20/18 0700)  Pulse: 87 (02/20/18 0945)  Resp: (!) 25 (02/20/18 0945)  BP: 116/60 (02/20/18 0930)  SpO2: (!) 94 % (02/20/18 0945) Vital Signs (24h Range):  Temp:  [97.7 °F (36.5 °C)-102 °F (38.9 °C)] 101 °F (38.3 °C)  Pulse:  [] 87  Resp:  [20-33] 25  SpO2:  [91 %-97 %] 94 %  BP: ()/(52-72) 116/60     Weight: 106.8 kg (235 lb 7.2 oz)  Body mass index is 36.88 kg/m².    Intake/Output Summary (Last 24 hours) at 02/20/18 1009  Last data filed at 02/20/18 0900   Gross per 24 hour   Intake          2343.83 ml   Output             1750 ml   Net           593.83 ml      Physical Exam   Constitutional: He appears well-developed.   Dishoveled   HENT:   Head: Normocephalic and atraumatic.   ET tube in place   Eyes: Conjunctivae are normal. Right eye exhibits no discharge. Left eye exhibits no discharge.   Neck: Normal range of motion.   Cardiovascular: Normal rate and regular rhythm.  Exam reveals no gallop and no friction rub.    No murmur heard.  Tachycardic   Pulmonary/Chest:   Course ventilated breath sounds that are diminished throughout, some rhonchi and + crackles at bases   Abdominal: Soft. Bowel sounds are normal.   Musculoskeletal: Normal range of motion. He exhibits edema.   1+ edema to LE   Neurological:   Sedated on vent   Skin: Skin is warm and dry. No erythema.   Psychiatric: He has a normal mood and affect. His behavior is  normal. Judgment and thought content normal.   Nursing note and vitals reviewed.      Significant Labs: All pertinent labs within the past 24 hours have been reviewed.    Significant Imaging: I have reviewed and interpreted all pertinent imaging results/findings within the past 24 hours.    Assessment/Plan:     * Acute respiratory failure with hypoxia and hypercarbia    Intubated for worsening respiratory distress. Multilobar PNA. Most likely aspiration PNA. Improved ventilator requirements from yesterday. On FiO2 60% and PEEP 10.   -Low Vt ventilation.   -Wean Vent according to ARDSnet protocol  -Daily awakening trial.   -On Vanc and Unasyn. Abx per ID. Cultures negative so far.  -Consider diuresing patient. He has received a fair amount of blood products and IVF.           Acute renal failure    Recommend diuresis. Likely ATN secondary to sepsis/shock.         GIB (gastrointestinal bleeding)    PPI. GI following. Transfuse as needed.         Sepsis    Off vasopressors.Shock likely secondary to PNA. Trend lactate. On broad spectrum Abx. Follow up cultures.           Critical Care time: 45 minutes.     Critical Care time for the evaluation and treatment of severe organ dysfunction, review of pertinent labs and imaging studies, discussions with primary team.     Continue ICU care.       Ivan Esquivel MD  Pulmonology  [facility]

## 2018-02-20 NOTE — PROGRESS NOTES
The patient remains intubated and not on pressors.  He did not have any rectal bleeding.  He is tolerating tube feeds.    Vitals:    02/20/18 0503 02/20/18 0505 02/20/18 0515 02/20/18 0530   BP:  (!) 98/57  (!) 102/58   Pulse: 97 96 92 88   Resp: (!) 22 (!) 23 (!) 28 (!) 28   Temp:       TempSrc:       SpO2: (!) 92% (!) 93% (!) 92% (!) 92%   Weight:       Height:          albuterol-ipratropium 2.5mg-0.5mg/3mL  3 mL Nebulization Q6H    ampicillin-sulbactim (UNASYN) IVPB  3 g Intravenous Q6H    chlorhexidine  15 mL Mouth/Throat BID    pneumoc 13-serafin conj-dip cr(PF)  0.5 mL Intramuscular Once     P.E.:  GEN: Intubated, NAD  HEENT: PERRL, anicteric sclera  CV: RRR, no M/R/G  Chest: Coarse breath sounds bilaterally  Abdomen: soft, NTND, normoactive BS  Ext: No C/C/E. 2+ dorsalis pedis pulses B    Labs:  Recent Results (from the past 336 hour(s))   CBC auto differential    Collection Time: 02/20/18  3:15 AM   Result Value Ref Range    WBC 2.20 (L) 3.90 - 12.70 K/uL    Hemoglobin 6.6 (L) 14.0 - 18.0 g/dL    Hematocrit 19.3 (LL) 40.0 - 54.0 %    Platelets 38 (LL) 150 - 350 K/uL   CBC auto differential    Collection Time: 02/19/18  4:00 AM   Result Value Ref Range    WBC 2.29 (L) 3.90 - 12.70 K/uL    Hemoglobin 8.4 (L) 14.0 - 18.0 g/dL    Hematocrit 24.5 (L) 40.0 - 54.0 %    Platelets 37 (LL) 150 - 350 K/uL   CBC auto differential    Collection Time: 02/18/18  8:06 PM   Result Value Ref Range    WBC 2.55 (L) 3.90 - 12.70 K/uL    Hemoglobin 9.1 (L) 14.0 - 18.0 g/dL    Hematocrit 26.3 (L) 40.0 - 54.0 %    Platelets 44 (L) 150 - 350 K/uL     CMP  Sodium   Date Value Ref Range Status   02/20/2018 138 136 - 145 mmol/L Final     Potassium   Date Value Ref Range Status   02/20/2018 3.7 3.5 - 5.1 mmol/L Final     Chloride   Date Value Ref Range Status   02/20/2018 103 95 - 110 mmol/L Final     CO2   Date Value Ref Range Status   02/20/2018 26 23 - 29 mmol/L Final     Glucose   Date Value Ref Range Status   02/20/2018 116 (H) 70  - 110 mg/dL Final     BUN, Bld   Date Value Ref Range Status   02/20/2018 42 (H) 6 - 20 mg/dL Final     Creatinine   Date Value Ref Range Status   02/20/2018 1.8 (H) 0.5 - 1.4 mg/dL Final     Calcium   Date Value Ref Range Status   02/20/2018 7.9 (L) 8.7 - 10.5 mg/dL Final     Total Protein   Date Value Ref Range Status   02/20/2018 5.6 (L) 6.0 - 8.4 g/dL Final     Albumin   Date Value Ref Range Status   02/20/2018 2.0 (L) 3.5 - 5.2 g/dL Final     Total Bilirubin   Date Value Ref Range Status   02/20/2018 0.5 0.1 - 1.0 mg/dL Final     Comment:     For infants and newborns, interpretation of results should be based  on gestational age, weight and in agreement with clinical  observations.  Premature Infant recommended reference ranges:  Up to 24 hours.............<8.0 mg/dL  Up to 48 hours............<12.0 mg/dL  3-5 days..................<15.0 mg/dL  6-29 days.................<15.0 mg/dL       Alkaline Phosphatase   Date Value Ref Range Status   02/20/2018 43 (L) 55 - 135 U/L Final     AST   Date Value Ref Range Status   02/20/2018 118 (H) 10 - 40 U/L Final     ALT   Date Value Ref Range Status   02/20/2018 19 10 - 44 U/L Final     Anion Gap   Date Value Ref Range Status   02/20/2018 9 8 - 16 mmol/L Final     eGFR if    Date Value Ref Range Status   02/20/2018 46 (A) >60 mL/min/1.73 m^2 Final     eGFR if non    Date Value Ref Range Status   02/20/2018 40 (A) >60 mL/min/1.73 m^2 Final     Comment:     Calculation used to obtain the estimated glomerular filtration  rate (eGFR) is the CKD-EPI equation.          No results for input(s): PT, INR, APTT in the last 24 hours.    A/P:  The patient is a 58 year old man with a history of HTN presenting with a fall, hepatitis C, pancytopenia, a left lower lobe pneumonia, and rectal bleeding.  1.  Rectal Bleeding - he had some rectal bleeding when a rectal tube was placed.  The patient has pancytopenia, for which HemOnc believes he may have bone  marrow failure.  The thrombocytopenia can also be from hepatitis C.  The nurse states that he did not have any rectal bleeding, yet his H/H decreased significantly.  The nurse states he will be receiving another transfusion of pRBCs.  Hematology is following him and is considering a bone marrow biopsy when he is more stable (most likely in the outpatient setting).  He can be conservatively managed for now.  Endoscopic procedures will be differed until he is more stable.

## 2018-02-20 NOTE — EICU
Vent Day # 3    59 y/o M Hep C, septic shock secondary to multilobar pneumonia.  Intubated 2/18 after failed Bipap. Pancytopenia  Also with GIB on PPI     2/20/2018 05:03   POC PH 7.464 (H)   POC PCO2 33.9 (L)   POC PO2 66 (L)   POC BE 1   POC HCO3 24.3   POC SATURATED O2 94 (L)   POC TCO2 25   FiO2 70   Vt 450   PiP 28   PEEP 10       I/O 18 liters positive since admit    · May benefit from diuresis, will add on BNP  · Daily sedation holiday and spontaneous breathing trial

## 2018-02-20 NOTE — PLAN OF CARE
Problem: Patient Care Overview  Goal: Plan of Care Review  Outcome: Ongoing (interventions implemented as appropriate)  Patient remains intubated; PRVC/70%/21/450/+10. Responds to pain; doesn't follow commands. NSR on the monitor. TMax of 102; prn medication given with relief. No signs of active bleeding. Propofol and Fentanyl remains for sedation. Protonix gtt also infusing. Pt tolerating tube feedings. Martinez CDI; UO WNL. Flexi-seal in place. No new skin breakdown, falls, or injuries this shift.

## 2018-02-20 NOTE — SUBJECTIVE & OBJECTIVE
Interval History: Off vasopressors.  Still on fair amount of ventilator support.     Review of Systems   Unable to perform ROS: Intubated     Objective:     Vital Signs (Most Recent):  Temp: (!) 101 °F (38.3 °C) (02/20/18 0700)  Pulse: 87 (02/20/18 0945)  Resp: (!) 25 (02/20/18 0945)  BP: 116/60 (02/20/18 0930)  SpO2: (!) 94 % (02/20/18 0945) Vital Signs (24h Range):  Temp:  [97.7 °F (36.5 °C)-102 °F (38.9 °C)] 101 °F (38.3 °C)  Pulse:  [] 87  Resp:  [20-33] 25  SpO2:  [91 %-97 %] 94 %  BP: ()/(52-72) 116/60     Weight: 106.8 kg (235 lb 7.2 oz)  Body mass index is 36.88 kg/m².    Intake/Output Summary (Last 24 hours) at 02/20/18 1009  Last data filed at 02/20/18 0900   Gross per 24 hour   Intake          2343.83 ml   Output             1750 ml   Net           593.83 ml      Physical Exam   Constitutional: He appears well-developed.   Dishoveled   HENT:   Head: Normocephalic and atraumatic.   ET tube in place   Eyes: Conjunctivae are normal. Right eye exhibits no discharge. Left eye exhibits no discharge.   Neck: Normal range of motion.   Cardiovascular: Normal rate and regular rhythm.  Exam reveals no gallop and no friction rub.    No murmur heard.  Tachycardic   Pulmonary/Chest:   Course ventilated breath sounds that are diminished throughout, some rhonchi and + crackles at bases   Abdominal: Soft. Bowel sounds are normal.   Musculoskeletal: Normal range of motion. He exhibits edema.   1+ edema to LE   Neurological:   Sedated on vent   Skin: Skin is warm and dry. No erythema.   Psychiatric: He has a normal mood and affect. His behavior is normal. Judgment and thought content normal.   Nursing note and vitals reviewed.      Significant Labs: All pertinent labs within the past 24 hours have been reviewed.    Significant Imaging: I have reviewed and interpreted all pertinent imaging results/findings within the past 24 hours.

## 2018-02-20 NOTE — PROGRESS NOTES
.  Progress Note  Infectious Disease    Admit Date: 2/12/2018   LOS: 8 days     SUBJECTIVE:     Follow-up For:  Fever of unclear etiology.    Antibiotics     Start     Stop Route Frequency Ordered    02/18/18 1345  ampicillin-sulbactam 3 g in sodium chloride 0.9 % 100 mL IVPB (ready to mix system)      -- IV Every 6 hours (non-standard times) 02/18/18 1233              OBJECTIVE:     Vital Signs (Most Recent)  Temp: (!) 101 °F (38.3 °C) (02/20/18 0700)  Pulse: 87 (02/20/18 0945)  Resp: (!) 25 (02/20/18 0945)  BP: 116/60 (02/20/18 0930)  SpO2: (!) 94 % (02/20/18 0945)    Temperature Range Min/Max (Last 24H):  Temp:  [97.7 °F (36.5 °C)-102 °F (38.9 °C)]     I & O (Last 24H):  Intake/Output Summary (Last 24 hours) at 02/20/18 1111  Last data filed at 02/20/18 1000   Gross per 24 hour   Intake          2299.63 ml   Output             2025 ml   Net           274.63 ml       Lines/Drains:       Percutaneous Central Line Insertion/Assessment - triple lumen  02/18/18 1023 right internal jugular (Active)   Dressing biopatch in place 2/20/2018  7:15 AM   Securement secured w/ sutures 2/20/2018  7:15 AM   Additional Site Signs no erythema;no warmth;no edema;no pain;no palpable cord;no streak formation;no drainage 2/20/2018  3:30 AM   Distal Patency/Care infusing 2/20/2018  7:15 AM   Medial Patency/Care infusing 2/20/2018  7:15 AM   Proximal Patency/Care infusing 2/20/2018  7:15 AM   Line Interventions other (see comments) 2/18/2018  6:00 PM   Dressing Change Due 02/25/18 2/20/2018  7:15 AM   Daily Line Review Performed 2/20/2018  7:15 AM            Peripheral IV - Single Lumen 02/18/18 0522 Right Hand (Active)   Site Assessment Clean;Dry;Intact;No redness;No swelling 2/20/2018  7:15 AM   Line Status Saline locked 2/20/2018  7:15 AM   Dressing Status Clean;Dry;Intact 2/20/2018  3:30 AM   Dressing Intervention New dressing 2/17/2018 11:15 PM   Dressing Change Due 02/22/18 2/20/2018  7:15 AM   Site Change Due 02/22/18 2/20/2018   7:15 AM   Reason Not Rotated Not due 2/20/2018  7:15 AM            NG/OG Tube 02/18/18 0930 16 Fr. Center mouth (Active)   Placement Check placement verified by aspirate characteristics 2/20/2018  7:15 AM   Tube advanced (cm) 65 2/19/2018  4:00 PM   Tolerance no signs/symptoms of discomfort 2/20/2018  7:15 AM   Securement taped to commercial device 2/20/2018  7:15 AM   Clamp Status/Tolerance unclamped 2/20/2018  7:15 AM   Suction Setting/Drainage Method suction at;low;intermittent setting 2/19/2018 12:00 PM   Insertion Site Appearance no redness, warmth, tenderness, skin breakdown, drainage 2/20/2018  7:15 AM   Drainage Bile 2/19/2018 12:00 PM   Flush/Irrigation flushed w/;water;no resistance met 2/20/2018  7:15 AM   Feeding Method continuous 2/20/2018  7:15 AM   Current Rate (mL/hr) 30 mL/hr 2/20/2018  7:15 AM   Goal Rate (mL/hr) 50 mL/hr 2/20/2018  7:15 AM   Intake (mL) 20 mL 2/20/2018  3:30 AM   Water Bolus (mL) 150 mL 2/20/2018  7:15 AM   Tube Output(mL)(Include Discarded Residual) 50 mL 2/19/2018  4:00 PM   Intake (mL) - Formula Tube Feeding 30 2/20/2018  6:00 AM   Residual Amount (ml) 20 ml 2/20/2018  7:15 AM       Laboratory:  Recent Results (from the past 24 hour(s))   Vancomycin, random    Collection Time: 02/19/18  5:19 PM   Result Value Ref Range    Vancomycin, Random 32.5 Not established ug/mL   CBC auto differential    Collection Time: 02/20/18  3:15 AM   Result Value Ref Range    WBC 2.20 (L) 3.90 - 12.70 K/uL    RBC 2.59 (L) 4.60 - 6.20 M/uL    Hemoglobin 6.6 (L) 14.0 - 18.0 g/dL    Hematocrit 19.3 (LL) 40.0 - 54.0 %    MCV 75 (L) 82 - 98 fL    MCH 25.5 (L) 27.0 - 31.0 pg    MCHC 34.2 32.0 - 36.0 g/dL    RDW 17.5 (H) 11.5 - 14.5 %    Platelets 38 (LL) 150 - 350 K/uL    MPV SEE COMMENT 9.2 - 12.9 fL    Gran # (ANC) 1.7 (L) 1.8 - 7.7 K/uL    Lymph # 0.3 (L) 1.0 - 4.8 K/uL    Mono # 0.2 (L) 0.3 - 1.0 K/uL    Eos # 0.0 0.0 - 0.5 K/uL    Baso # 0.02 0.00 - 0.20 K/uL    Gran% 77.3 (H) 38.0 - 73.0 %     Lymph% 15.0 (L) 18.0 - 48.0 %    Mono% 8.6 4.0 - 15.0 %    Eosinophil% 0.0 0.0 - 8.0 %    Basophil% 0.9 0.0 - 1.9 %    Differential Method Automated    Comprehensive metabolic panel    Collection Time: 02/20/18  3:15 AM   Result Value Ref Range    Sodium 138 136 - 145 mmol/L    Potassium 3.7 3.5 - 5.1 mmol/L    Chloride 103 95 - 110 mmol/L    CO2 26 23 - 29 mmol/L    Glucose 116 (H) 70 - 110 mg/dL    BUN, Bld 42 (H) 6 - 20 mg/dL    Creatinine 1.8 (H) 0.5 - 1.4 mg/dL    Calcium 7.9 (L) 8.7 - 10.5 mg/dL    Total Protein 5.6 (L) 6.0 - 8.4 g/dL    Albumin 2.0 (L) 3.5 - 5.2 g/dL    Total Bilirubin 0.5 0.1 - 1.0 mg/dL    Alkaline Phosphatase 43 (L) 55 - 135 U/L     (H) 10 - 40 U/L    ALT 19 10 - 44 U/L    Anion Gap 9 8 - 16 mmol/L    eGFR if African American 46 (A) >60 mL/min/1.73 m^2    eGFR if non African American 40 (A) >60 mL/min/1.73 m^2   Vancomycin, random    Collection Time: 02/20/18  3:15 AM   Result Value Ref Range    Vancomycin, Random 30.0 Not established ug/mL   ISTAT PROCEDURE    Collection Time: 02/20/18  5:03 AM   Result Value Ref Range    POC PH 7.464 (H) 7.35 - 7.45    POC PCO2 33.9 (L) 35 - 45 mmHg    POC PO2 66 (L) 80 - 100 mmHg    POC HCO3 24.3 24 - 28 mmol/L    POC BE 1 -2 to 2 mmol/L    POC SATURATED O2 94 (L) 95 - 100 %    POC TCO2 25 23 - 27 mmol/L    Rate 21     Sample ARTERIAL     Site RR     Allens Test Pass     DelSys Adult Vent     Mode AC/PRVC     Vt 450     PEEP 10     PiP 28     FiO2 70     Min Vol 11.4     Sp02 91    Protime-INR    Collection Time: 02/20/18  5:50 AM   Result Value Ref Range    Prothrombin Time 10.2 9.0 - 12.5 sec    INR 1.0 0.8 - 1.2   Brain natriuretic peptide    Collection Time: 02/20/18  8:40 AM   Result Value Ref Range    BNP 33 0 - 99 pg/mL   ISTAT PROCEDURE    Collection Time: 02/20/18  9:15 AM   Result Value Ref Range    POC PH 7.440 7.35 - 7.45    POC PCO2 37.6 35 - 45 mmHg    POC PO2 63 (L) 80 - 100 mmHg    POC HCO3 25.5 24 - 28 mmol/L    POC BE 1 -2 to 2  mmol/L    POC SATURATED O2 92 (L) 95 - 100 %    POC TCO2 27 23 - 27 mmol/L    Rate 20     Sample ARTERIAL     Site LR     Allens Test Pass     DelSys Adult Vent     Mode AC/PRVC     Vt 450     PEEP 10     PiP 33     FiO2 60     Sp02 92        Micro:  Microbiology Results (last 7 days)     Procedure Component Value Units Date/Time    Culture, Respiratory with Gram Stain [[id number]] Collected:  02/18/18 0925    Order Status:  Completed Specimen:  Respiratory from Endotracheal Aspirate Updated:  02/20/18 0842     Respiratory Culture Normal respiratory rosie     Gram Stain (Respiratory) <10 epithelial cells per low power field.     Gram Stain (Respiratory) Moderate WBC's     Gram Stain (Respiratory) No organisms seen    Blood culture [[id number]] Collected:  02/14/18 2140    Order Status:  Completed Specimen:  Blood Updated:  02/19/18 2303     Blood Culture, Routine No growth after 5 days.    Blood culture [[id number]] Collected:  02/14/18 2120    Order Status:  Completed Specimen:  Blood Updated:  02/19/18 2303     Blood Culture, Routine No growth after 5 days.    AFB Culture & Smear [[id number]] Collected:  02/17/18 1135    Order Status:  Completed Specimen:  Respiratory from Sputum, Expectorated Updated:  02/19/18 2127     AFB Culture & Smear Culture in progress     AFB CULTURE STAIN No acid fast bacilli seen.    Blood culture [[id number]] Collected:  02/18/18 1215    Order Status:  Completed Specimen:  Blood Updated:  02/19/18 1503     Blood Culture, Routine No Growth to date     Blood Culture, Routine No Growth to date    Blood culture [[id number]] Collected:  02/18/18 1210    Order Status:  Completed Specimen:  Blood Updated:  02/19/18 1503     Blood Culture, Routine No Growth to date     Blood Culture, Routine No Growth to date    Blood culture [[id number]] Collected:  02/12/18 1755    Order Status:  Completed Specimen:  Blood from Peripheral, Hand, Left Updated:  02/17/18 2103     Blood Culture, Routine No growth  after 5 days.    Blood culture [[id number]] Collected:  02/12/18 1740    Order Status:  Completed Specimen:  Blood from Peripheral, Antecubital, Left Updated:  02/17/18 2103     Blood Culture, Routine No growth after 5 days.    Clostridium difficile EIA [[id number]] Collected:  02/16/18 1550    Order Status:  Completed Specimen:  Stool from Stool Updated:  02/17/18 1433     C. diff Antigen Negative     C difficile Toxins A+B, EIA Negative     Comment: Testing not recommended for children <24 months old.       AFB Culture & Smear [[id number]]     Order Status:  No result Specimen:  Respiratory from Sputum, Expectorated     AFB Culture & Smear [[id number]]     Order Status:  No result Specimen:  Respiratory     AFB Culture & Smear [[id number]] Collected:  02/13/18 1130    Order Status:  Completed Specimen:  Respiratory from Sputum, Induced Updated:  02/15/18 1529     AFB Culture & Smear Culture in progress     AFB CULTURE STAIN No acid fast bacilli seen.    Culture, Respiratory with Gram Stain [[id number]] Collected:  02/13/18 1130    Order Status:  Completed Specimen:  Respiratory from Sputum, Induced Updated:  02/15/18 0813     Respiratory Culture Normal respiratory rosie     Gram Stain (Respiratory) <10 epithelial cells per low power field.     Gram Stain (Respiratory) Few WBC's     Gram Stain (Respiratory) Rare Gram positive cocci in pairs    Urine culture [[id number]] Collected:  02/12/18 1408    Order Status:  Completed Specimen:  Urine from Urine, Clean Catch Updated:  02/14/18 1048     Urine Culture, Routine No significant growth    Direct AFB stain [[id number]] Collected:  02/13/18 1130    Order Status:  Completed Specimen:  Respiratory from Sputum, Induced Updated:  02/13/18 1419     Direct Acid Fast No acid fast bacilli seen.              ASSESSMENT/PLAN:     Active Hospital Problems    Diagnosis  POA    *Acute respiratory failure with hypoxia and hypercarbia [J96.01, J96.02]  Yes    Acute renal failure [N17.9]   Yes    Malnutrition of moderate degree [E44.0]  Yes    GIB (gastrointestinal bleeding) [K92.2]  Yes    Septic shock [A41.9, R65.21]  Yes    Chronic hepatitis C virus infection [B18.2]  Yes    Pneumonia of left lower lobe due to infectious organism [J18.1]  Yes    Aspiration pneumonia [J69.0]  Yes    Sepsis [A41.9]  Yes    Pancytopenia [D61.818]  Yes    Hypokalemia [E87.6]  Yes    Essential hypertension [I10]  Yes     Chronic      Resolved Hospital Problems    Diagnosis Date Resolved POA   No resolved problems to display.           Physical Exam:  Gen: NAD  Pul: CTA   Cardio:  +S1+S2  Abd: Soft, NT  Ext: No edema  Skin: No active lesions        IMPRESSION;   1) Fever of unclear etiology.  2) HepC antibody (RNA confirmation pending).  3) Multilobar PNA.  4) Pancytopenia.   5) YOLI.    RECOMMENDATIONS;   1) Cont with the vanc/amp-sulbactam; please have pharmacy help to manage the vanc/troughs.  3) Blood cx's are negative.   4) Concerned about fevers; work up is negative... Will add a Ua/UCx.  5) If fevers persists- will broaden coverage to include Pseudmonas and ESBLs.  5) We will cont to follow.

## 2018-02-21 NOTE — NURSING
Spoke w/  regarding Vancomycin level. She states that she doesn't want to administer Vancomycin at this time.

## 2018-02-21 NOTE — PROGRESS NOTES
02/21/18 0741   Patient Assessment/Suction   Level of Consciousness (AVPU) responds to pain   Respiratory Effort Unlabored   Expansion/Accessory Muscles/Retractions expansion symmetric;no retractions;no use of accessory muscles   All Lung Fields Breath Sounds coarse   Rhythm/Pattern, Respiratory artificial airway;ventilator assisted   Cough Frequency with stimulation   Suction Method required;endotracheal tube   Sputum Amount moderate   Sputum Color yellow   Sputum Consistency thick   PRE-TX-O2-ETCO2   O2 Device (Oxygen Therapy) ventilator   Oxygen Concentration (%) 60   SpO2 (!) 94 %   Pulse Oximetry Type Continuous   $ Pulse Oximetry - Multiple Charge Pulse Oximetry - Multiple   Pulse 104   Resp (!) 22   Aerosol Therapy   $ Aerosol Therapy Charges Aerosol Treatment   Respiratory Treatment Status given   SVN/Inhaler Treatment Route ET tube;in-line   Position During Treatment HOB at 45 degrees   Patient Tolerance good   Post-Treatment   Post-treatment Heart Rate (beats/min) 110   Post-treatment Resp Rate (breaths/min) 23   All Fields Breath Sounds coarse       Airway - Non-Surgical 02/18/18 0914 Endotracheal Tube   Placement Date/Time: 02/18/18 0914   Present Prior to Hospital Arrival?: No  Method of Intubation: Direct laryngoscopy  Inserted by: Anesthesia MD  Staff/Resident Name(s): MD Kwesi  Airway Device: Endotracheal Tube  Intubated: Postinduction  Airway ...   Secured at 25 cm   Measured At Lips   Secured Location Left  (moved to left)   Secured by Commercial tube tao   Bite Block none   Site Condition Dry   Status Intact;Secured;Patent   Site Assessment Clean;Dry   Cuff Pressure 28 cm H2O   Respiratory Interventions   Airway/Ventilation Management airway patency maintained   Airway/Ventilation Management (Actively Dying Patient) comfort measures provided   Respiratory Support airway management;breathing techniques promoted;calming techniques promoted;HOB elevated;position of comfort   VAP Prevention HOB  elevation maintained   Vent Select   Conventional Vent Y   $ Ventilator Subsequent 1   Charged w/in last 24h YES   IHI Ventilator Associated Pneumonia Bundle   Daily Awakening Trials Performed No   Daily Assessment of Readiness to Extubate Yes   Head of Bed Elevated  HOB 45   Oral Care Mouth suctioned   Additional VAP Prevention Documentation Clean equipment maintained   Preset Conventional Ventilator Settings   Vent ID [id number]   Vent Type Servo i   Vent Mode PRVC   Humidity HME   Set Rate 20 bmp   Vt Set 450 mL   PEEP/CPAP 10 cmH20   Insp Time 0.9 Sec(s)   Insp Rise Time  0.15 %   Patient Ventilator Parameters   Resp Rate Total 21 br/min   Peak Airway Pressure 25.8 cmH2O   Mean Airway Pressure 14.8 cmH20   Exhaled Vt 456 mL   Total Ve 9.4 mL   Conventional Ventilator Alarms   Ve High Alarm 40 L/min   Ve Low Alarm 2 L/min   Resp Rate High Alarm 45 br/min   Resp Rate Low Alarm 5   Ready to Wean/Extubation Screen   FIO2<=50 (chart decimal) (!) 0.6   MV<16L (chart vol.) 9.4   PEEP <=8 (chart #) (!) 10   Extubation Screen Passed? Failed   Ready to Wean Parameters   Cough Reflex? Yes   F/VT Ratio<105 (RSBI) (!) 48.25

## 2018-02-21 NOTE — [facility] SECONDARY REVIEW
[facility]    IP Extended Stay > 10    LOS: approved w/o recommendations due to severity of clinical picture   9 DAY LOS ADMITTED WITH LLL PNEUMONIA,PANOCYTOPENIA,  LATER DEVELOPED RESP FAILURE, SEPSIS. REQUIRED TRANSFER TO ICU AND INTUBATION  REMAINS INTUBATED IN ICU REQUIRING HIGH FIO2@60% FEBRILE. GI AND HEM-[facility] W/U DEFERRED UNTIL PATIENT IS MORE STABLE.   DR SUMMERS APPROVING CONTINUED STAY

## 2018-02-21 NOTE — PLAN OF CARE
Problem: Ventilation, Mechanical Invasive (Adult)  Intervention: Prevent Airway Displacement/Mechanical Dysfunction  Patient received on servoi ventilator with 8.0 ETT secured at 25cm with ETT moved to right side of lip. All ventilator alarms on, set and functioning. ambu bag and mask at bedside. Aerosol treatment given as ordered. Suctioning done as needed. Will continue with ventilator management. Oral care done.

## 2018-02-21 NOTE — PROGRESS NOTES
Subjective:  CC - rectal bleeding    Patient remains intubated.  Unable to obtain ROS.  The RN reports dark/maroon colored stool from the patient's rectal tube.  He is tolerating tube feeds without issues.    Objective:  Vitals:    02/21/18 0741   BP:    Pulse: 104   Resp: (!) 22   Temp:      Physical Exam:  GEN: Intubated and sedated.  OG tube noted.  HENT: Normocephalic, anicteric sclera   Cardiovascular: Regular rate and rhythm. No murmurs appreciated.   Chest: Non-labored respirations. Breath sounds equal   Abdomen: Soft, NTND, normoactive BS  Psych: Appropriate mood and affect.   Extermities: No C/C/E. 2+ dorsalis pedis pulses bilaterally      Recent Labs  Lab 02/21/18  0433   WBC 3.23*   RBC 4.60   HGB 12.1*   HCT 35.6*   PLT 58*   MCV 77*   MCH 26.3*   MCHC 34.0     Impression: 58 year old male with a history of HTN presenting after a fall with pancytopenia, acute renal failure, sepsis, pneumonia and rectal bleeding.  He was found to have antibodies to hepatitis C during hospitalization.    Plan:  1.  Rectal bleeding - exact source remains unclear though suspect could be secondary to thrombocytopenia.  His H/H dropped yesterday but has overcorrected this morning after receiving 2 units of PRBCs yesterday.  He remains off of pressors, and his BP remains overall stable.  He continues to have some dark/maroon output from his rectal tube.  Hem/Onc is also following the patient and is considering a bone marrow biopsy to further work-up his pancytopenia, which will likely be done as an outpatient.  Given his current clinical condition, endoscopic procedures will be deferred for now until he is more stable.  Protonix 40 mg IV twice daily will be continued.

## 2018-02-21 NOTE — PLAN OF CARE
Problem: Patient Care Overview  Goal: Plan of Care Review  Outcome: Ongoing (interventions implemented as appropriate)  Cardiac monitoring shows SR without ectopy. Orally intubated, OG tube clamped on today. R IJ TLC dislodged with catheter intact. PIV X 3 initiated. Protonix infusion stopped. Remains on Propofol and fentanyl throughout day w/ adequate sedation. Martinez to gravity w/ clear yellow urine, Lasix IVP given with 2700 mls out today. Blood transfusion started this evening. Flexi seal intact w/ no further output. Remains free of falls and injury this shift.

## 2018-02-21 NOTE — PROGRESS NOTES
02/21/18 1142   Patient Assessment/Suction   Level of Consciousness (AVPU) responds to pain   Respiratory Effort Unlabored   Expansion/Accessory Muscles/Retractions no retractions;no use of accessory muscles   All Lung Fields Breath Sounds coarse   Rhythm/Pattern, Respiratory artificial airway;ventilator assisted   Cough Frequency with stimulation   Cough Type good   Suction Method required;endotracheal tube   Sputum Amount small   Sputum Color red-streaked;yellow   Sputum Consistency thick   PRE-TX-O2-ETCO2   O2 Device (Oxygen Therapy) ventilator   Oxygen Concentration (%) 80  (Pt sats kept dropping to 85-86 )   SpO2 (!) 88 %   Pulse Oximetry Type Continuous   $ Pulse Oximetry - Multiple Charge Pulse Oximetry - Multiple       Airway - Non-Surgical 02/18/18 0914 Endotracheal Tube   Placement Date/Time: 02/18/18 0914   Present Prior to Hospital Arrival?: No  Method of Intubation: Direct laryngoscopy  Inserted by: Anesthesia MD  Staff/Resident Name(s): MD Kwesi  Airway Device: Endotracheal Tube  Intubated: Postinduction  Airway ...   Secured at 25 cm   Measured At Lips   Secured Location Left   Secured by Commercial tube tao   Bite Block none   Site Condition Dry   Status Intact;Secured;Patent   Site Assessment Clean;Dry   Vent Select   Conventional Vent Y   Charged w/in last 24h YES   Preset Conventional Ventilator Settings   Vent ID [id number]   Vent Type Servo i   Vent Mode PRVC   Humidity HME   Set Rate 20 bmp   Vt Set 450 mL   PEEP/CPAP 10 cmH20   Insp Time 0.9 Sec(s)   Insp Rise Time  0.15 %   Patient Ventilator Parameters   Resp Rate Total 22 br/min   Peak Airway Pressure 29.6 cmH2O   Mean Airway Pressure 17.9 cmH20   Exhaled Vt 398 mL   Total Ve 9.7 mL   Conventional Ventilator Alarms   Ve High Alarm 40 L/min   Ve Low Alarm 2 L/min   Resp Rate High Alarm 45 br/min   Resp Rate Low Alarm 5   Ready to Wean/Extubation Screen   FIO2<=50 (chart decimal) (!) 0.8   MV<16L (chart vol.) 9.7   PEEP <=8 (chart #) (!) 10

## 2018-02-21 NOTE — EICU
eICU Note : Ventilator Rounds Vent day# 3  Input/Output: 3600  CXR: Multi-lobar Pneumonia  On Mechanical ventilator :settings:   Mode: PRVC  TV: 450   RR 20/mt PEEP10  FiO2  60% MAP 29  SaO2 %  Last AB.44/37.6/63/25.5/  Checked Readiness to wean: P/F ratio 105   F/TV   PEEP +10  Sedation on/off:Propofol and Fentanyl    Pertinent History and labs reviewed : 59 y/o male admitted with Lt lower  pneumonia , Septic Shock intubated       Treatment /Intervention given:SBT and ,Diuresis        Saundra Gorman M.D  eICU Physician

## 2018-02-21 NOTE — PROGRESS NOTES
02/21/18 1222   Patient Assessment/Suction   Level of Consciousness (AVPU) responds to pain   Respiratory Effort Unlabored   Expansion/Accessory Muscles/Retractions no retractions   All Lung Fields Breath Sounds coarse   Rhythm/Pattern, Respiratory artificial airway;ventilator assisted   Cough Frequency with stimulation   PRE-TX-O2-ETCO2   O2 Device (Oxygen Therapy) ventilator   Oxygen Concentration (%) 70   SpO2 (!) 94 %   Pulse Oximetry Type Continuous   $ Pulse Oximetry - Multiple Charge Pulse Oximetry - Multiple   Pulse 109   Resp 20   Aerosol Therapy   $ Aerosol Therapy Charges Aerosol Treatment   Respiratory Treatment Status given   SVN/Inhaler Treatment Route ET tube;in-line   Position During Treatment HOB at 30 degrees   Patient Tolerance good   Post-Treatment   Post-treatment Heart Rate (beats/min) 108   Post-treatment Resp Rate (breaths/min) 20   All Fields Breath Sounds coarse       Airway - Non-Surgical 02/18/18 0914 Endotracheal Tube   Placement Date/Time: 02/18/18 0914   Present Prior to Hospital Arrival?: No  Method of Intubation: Direct laryngoscopy  Inserted by: Anesthesia MD  Staff/Resident Name(s): MD Kwesi  Airway Device: Endotracheal Tube  Intubated: Postinduction  Airway ...   Secured at 25 cm   Measured At Lips   Secured Location Left   Secured by Commercial tube tao   Bite Block none   Site Condition Dry   Status Intact;Secured;Patent   Site Assessment Clean;Dry   Vent Select   Conventional Vent Y   Charged w/in last 24h YES   Preset Conventional Ventilator Settings   Vent ID [id number]   Vent Type Servo i   Vent Mode PRVC   Humidity HME   Set Rate 20 bmp   Vt Set 450 mL   PEEP/CPAP 10 cmH20   Insp Time 0.9 Sec(s)   Insp Rise Time  0.15 %   Patient Ventilator Parameters   Resp Rate Total 20 br/min   Peak Airway Pressure 36.3 cmH2O   Mean Airway Pressure 18.2 cmH20   Exhaled Vt 971 mL   Total Ve 19.2 mL   Conventional Ventilator Alarms   Ve High Alarm 40 L/min   Ve Low Alarm 2 L/min   Resp  Rate High Alarm 45 br/min   Resp Rate Low Alarm 5   Ready to Wean/Extubation Screen   FIO2<=50 (chart decimal) (!) 0.7   MV<16L (chart vol.) (!) 19.2   PEEP <=8 (chart #) (!) 10   Ready to Wean Parameters   F/VT Ratio<105 (RSBI) (!) 20.6

## 2018-02-21 NOTE — PLAN OF CARE
02/21/18 1720   Discharge Reassessment   Assessment Type Discharge Planning Reassessment   Provided patient/caregiver education on the expected discharge date and the discharge plan No   Do you have any problems affording any of your prescribed medications? TBD   Discharge Plan A Home   Discharge Plan B Shelter   Patient choice form signed by patient/caregiver No   Can the patient answer the patient profile reliably? No, cognitively impaired   How does the patient rate their overall health at the present time? Poor   Describe the patient's ability to walk at the present time. Does not walk or unable to take any steps at all   How often would a person be available to care for the patient? Occasionally   Number of comorbid conditions (as recorded on the chart) None   During the past month, has the patient often been bothered by feeling down, depressed or hopeless? No   During the past month, has the patient often been bothered by little interest or pleasure in doing things? No   patient in ICU on vent support. Transferred to ICU on 2/16 due to respiratory distress and tachycardia. Vent initiated on 2/17. SW will continue to follow, update as patient progresses and assist as needed.

## 2018-02-21 NOTE — PROGRESS NOTES
02/21/18 1716   Patient Assessment/Suction   Level of Consciousness (AVPU) responds to pain   Respiratory Effort Unlabored   Expansion/Accessory Muscles/Retractions no retractions;no use of accessory muscles   All Lung Fields Breath Sounds coarse   Rhythm/Pattern, Respiratory artificial airway;ventilator assisted   Cough Frequency with stimulation   Sputum Amount moderate   Sputum Color red-streaked;yellow   Sputum Consistency thick   PRE-TX-O2-ETCO2   O2 Device (Oxygen Therapy) ventilator   Oxygen Concentration (%) 70   SpO2 (!) 92 %   Pulse Oximetry Type Continuous   $ Pulse Oximetry - Multiple Charge Pulse Oximetry - Multiple   Resp (!) 25       Airway - Non-Surgical 02/18/18 0914 Endotracheal Tube   Placement Date/Time: 02/18/18 0914   Present Prior to Hospital Arrival?: No  Method of Intubation: Direct laryngoscopy  Inserted by: Anesthesia MD  Staff/Resident Name(s): MD Kwesi  Airway Device: Endotracheal Tube  Intubated: Postinduction  Airway ...   Secured at 25 cm   Measured At Lips   Secured Location Left   Secured by Commercial tube tao   Bite Block none   Site Condition Dry   Status Intact;Secured;Patent   Site Assessment Dry   Vent Select   Conventional Vent Y   Charged w/in last 24h YES   IHI Ventilator Associated Pneumonia Bundle   Head of Bed Elevated  HOB 30   Oral Care Mouth suctioned   Additional VAP Prevention Documentation Clean equipment maintained   Preset Conventional Ventilator Settings   Vent ID [id number]   Vent Type Servo i   Vent Mode PRVC   Humidity HME   Set Rate 20 bmp   Vt Set 450 mL   PEEP/CPAP 10 cmH20   Insp Time 0.9 Sec(s)   Insp Rise Time  0.15 %   Patient Ventilator Parameters   Resp Rate Total 26 br/min   Peak Airway Pressure 31 cmH2O   Mean Airway Pressure 20.1 cmH20   Exhaled Vt 369 mL   Total Ve 11.8 mL   Conventional Ventilator Alarms   Ve High Alarm 40 L/min   Ve Low Alarm 2 L/min   Resp Rate High Alarm 45 br/min   Resp Rate Low Alarm 5   Ready to Wean/Extubation Screen    FIO2<=50 (chart decimal) (!) 0.7   MV<16L (chart vol.) 11.8   PEEP <=8 (chart #) (!) 10   Ready to Wean Parameters   F/VT Ratio<105 (RSBI) (!) 67.75

## 2018-02-21 NOTE — PROGRESS NOTES
[facility]  Hematology/Oncology  Progress Note    Patient Name: Dwight Bolton  Admission Date: 2018  Hospital Length of Stay: 9 days  Code Status: Full Code     Subjective:     Interval History:     2018: Continue to have fever.   2018: remained intubated in icu. +  fever   2019: developed respiratory failure and septic shock leading to icu admission. Off Granix   2018: states feeling better. Fever improving        Oncology Treatment Plan:/   [No treatment plan]    Medications:  Continuous Infusions:   fentanyl 62.5 mcg/hr (18)    propofol 25 mcg/kg/min (18)     Scheduled Meds:   albuterol-ipratropium 2.5mg-0.5mg/3mL  3 mL Nebulization Q6H    ampicillin-sulbactim (UNASYN) IVPB  3 g Intravenous Q6H    chlorhexidine  15 mL Mouth/Throat BID    cyanocobalamin  1,000 mcg Per OG tube Daily    folic acid  1 mg Per OG tube Daily    furosemide  40 mg Intravenous Once    pantoprazole  40 mg Intravenous BID    pneumoc 13-serafin conj-dip cr(PF)  0.5 mL Intramuscular Once     PRN Meds:sodium chloride, sodium chloride, sodium chloride, sodium chloride, acetaminophen, albuterol-ipratropium 2.5mg-0.5mg/3mL, cloNIDine, [] fentaNYL **FOLLOWED BY** fentaNYL, gelatin adsorbable 100cm top sponge, influenza, ondansetron, promethazine (PHENERGAN) IVPB, ramelteon, senna-docusate 8.6-50 mg     Review of Systems     Unable to obtain- pt in icu on a vent     Objective:     Vital Signs (Most Recent):  Temp: (!) 101.5 °F (38.6 °C) (1843)  Pulse: 103 (1845)  Resp: (!) 25 (18)  BP: (!) 96/53 (18)  SpO2: (!) 91 % (18) Vital Signs (24h Range):  Temp:  [99 °F (37.2 °C)-101.5 °F (38.6 °C)] 101.5 °F (38.6 °C)  Pulse:  [] 103  Resp:  [0-44] 25  SpO2:  [70 %-98 %] 91 %  BP: ()/(53-82) 96/53     Weight: 106.8 kg (235 lb 7.2 oz)  Body mass index is 36.88 kg/m².  Body surface area is 2.25 meters  squared.      Intake/Output Summary (Last 24 hours) at 02/21/18 0735  Last data filed at 02/21/18 0600   Gross per 24 hour   Intake          1747.19 ml   Output             5100 ml   Net         -3352.81 ml       Physical Exam    General: In ICU on a vent.   Head: normocephalic, atraumatic   Eyes: conjunctivae pale    Throat: ET tube   Neck: supple, no LAD   Lungs: rhonchi and crackles bilaterally, left > R  Heart: S1, S2, RR   Abdomen: + BS x 4 quadrants, soft, non tender.   Extremities: no cyanosis or edema.   Skin: turgor normal, no erythema or rashes.  Neuro: open eyes     Significant Labs:   CBC:     Recent Labs  Lab 02/20/18 0315 02/21/18  0433   WBC 2.20* 3.23*   HGB 6.6* 12.1*   HCT 19.3* 35.6*   PLT 38* 58*   , CMP:     Recent Labs  Lab 02/20/18 0315 02/21/18  0433    141   K 3.7 3.9    102   CO2 26 28   * 103   BUN 42* 43*   CREATININE 1.8* 1.8*   CALCIUM 7.9* 8.5*   PROT 5.6* 6.8   ALBUMIN 2.0* 2.3*   BILITOT 0.5 0.8   ALKPHOS 43* 64   * 177*   ALT 19 35   ANIONGAP 9 11   EGFRNONAA 40* 40*   , Coagulation:     Recent Labs  Lab 02/20/18  0550 02/21/18  0433   INR 1.0 0.9   , Haptoglobin: No results for input(s): HAPTOGLOBIN in the last 48 hours., Immunology: No results for input(s): SPEP, MINAL, LUCIEN, FREELAMBDALI in the last 48 hours., LDH: No results for input(s): LDHCSF, BFSOURCE in the last 48 hours., Reticulocytes: No results for input(s): RETIC in the last 48 hours., Tumor Markers: No results for input(s): PSA, CEA, , AFPTM, [id number],  in the last 48 hours.    Invalid input(s): ALGTM, Uric Acid No results for input(s): URICACID in the last 48 hours. and Urine Studies:     Recent Labs  Lab 02/20/18  1345   COLORU Straw   APPEARANCEUA Hazy*   PHUR 5.0   SPECGRAV 1.010   PROTEINUA Negative   GLUCUA Negative   KETONESU Negative   BILIRUBINUA Negative   OCCULTUA 2+*   NITRITE Negative   UROBILINOGEN Negative   LEUKOCYTESUR Negative   RBCUA 2   BACTERIA Moderate*    HYALINECASTS 1       Diagnostic Results:      Assessment/Plan:     Active Diagnoses:    Diagnosis Date Noted POA    PRINCIPAL PROBLEM:  Acute respiratory failure with hypoxia and hypercarbia [J96.01, J96.02] 02/18/2018 Yes    Acute renal failure [N17.9] 02/19/2018 Yes    Malnutrition of moderate degree [E44.0] 02/19/2018 Yes    GIB (gastrointestinal bleeding) [K92.2] 02/18/2018 Yes    Septic shock [A41.9, R65.21] 02/18/2018 Yes    Chronic hepatitis C virus infection [B18.2] 02/15/2018 Yes    Pneumonia of left lower lobe due to infectious organism [J18.1] 02/14/2018 Yes    Aspiration pneumonia [J69.0] 02/12/2018 Yes    Pancytopenia [D61.818] 02/12/2018 Yes    Hypokalemia [E87.6] 02/12/2018 Yes    Essential hypertension [I10] 02/12/2018 Yes     Chronic      Problems Resolved During this Admission:    Diagnosis Date Noted Date Resolved POA       Mr. Bolton, 56 YO man with possible CAP found to have pancytopenia. Low albumin, elevated AST      1. Pancytopenia: multifactorial including infection, malnutrition.               - worsening wbc- ANC    - Neutropenic isolation              - Granix 300 mcg daily until ANC > 1500: Discontinued               - s/p prbc transfusion               - elevated  LDH              - Hep C +   - pt has inappropriately low retic- pt has underlying bone marrow pathology   - may need BM bx- can be done out pt once acute infectious issues resolves   - also Hep C causing cytopenia- especially low platelets   - s/p  6 units prbc   - continue to have fever        2. CAP: now with sepsis and resp failure     3. Acute hypoxic respiratory failure - on vent    - pulmonary following     4. Fever: in the context of pancytopenia - broadened coverage   - ? Need for antifungal coverage   - ID following pt   - Vanc on hold due to high trough    - need CT of chest/ab/pelvis       Anthony Ramachandran M.D  Internal Medicine & Geriatric Medicine  Hematology & Oncology  Palliative Medicine    [address], [address]  [place] [address]  [phone] (Office)  [phone] (Fax)

## 2018-02-22 NOTE — PROGRESS NOTES
Pt recieved intubated on Servo i ventilator with the following settings;. PRVC, , +10 Peep, 20 RR, 70 % FI02 Alarms are set and functioning, Ambu bag at bedside, Pt without distress RT will continue to monitor and wean as tolerated.     07:35  RT did not perform spontaneous breathing trial due to above documented setting/support Pt currently requiring.    15:00 PT Sa02 dropped to 86%; RT increased FI02 to 80%.

## 2018-02-22 NOTE — SUBJECTIVE & OBJECTIVE
Interval History: pancytopenia better. No longer neutropenic. Persistently febrile however and oxygen demand actually increased despite diuresis.     Review of Systems   Unable to perform ROS: Intubated     Objective:     Vital Signs (Most Recent):  Temp: 100.1 °F (37.8 °C) (02/20/18 1500)  Pulse: 93 (02/20/18 1515)  Resp: 17 (02/20/18 1515)  BP: (!) 169/77 (02/20/18 1500)  SpO2: 95 % (02/20/18 1515) Vital Signs (24h Range):  Temp:  [99 °F (37.2 °C)-103.9 °F (39.9 °C)] 103.9 °F (39.9 °C)  Pulse:  [] 112  Resp:  [13-44] 36  SpO2:  [88 %-96 %] 90 %  BP: ()/(51-82) 127/60     Weight: 106.8 kg (235 lb 7.2 oz)  Body mass index is 36.88 kg/m².    Intake/Output Summary (Last 24 hours) at 02/21/18 1823  Last data filed at 02/21/18 1800   Gross per 24 hour   Intake          1916.32 ml   Output             6704 ml   Net         -4787.68 ml      Physical Exam   Constitutional: He appears well-developed.   Disheveled, unkept   HENT:   Head: Normocephalic and atraumatic.   ET tube in place   Eyes: Conjunctivae are normal. Right eye exhibits no discharge. Left eye exhibits no discharge.   Neck: Normal range of motion.   Cardiovascular: Normal rate and regular rhythm.  Exam reveals no gallop and no friction rub.    No murmur heard.  Pulmonary/Chest:   Course ventilated breath sounds that are diminished throughout, some rhonchi and + crackles at bases   Abdominal: Soft. Bowel sounds are normal.   Musculoskeletal: Normal range of motion. He exhibits edema.   1+ edema throughtout   Neurological:   Sedated on vent   Skin: Skin is warm and dry. No erythema.   Burn marks on palmar aspect left thumb   Psychiatric: He has a normal mood and affect. His behavior is normal. Judgment and thought content normal.   Nursing note and vitals reviewed.      Significant Labs: All pertinent labs within the past 24 hours have been reviewed.    Significant Imaging: I have reviewed and interpreted all pertinent imaging results/findings  within the past 24 hours.

## 2018-02-22 NOTE — PROGRESS NOTES
.  Progress Note  Infectious Disease    Admit Date: 2/12/2018   LOS: 10 days     SUBJECTIVE:     Follow-up For:  Fever of unclear etiology.    Antibiotics     Start     Stop Route Frequency Ordered    02/22/18 1000  vancomycin (VANCOCIN) 1,500 mg in sodium chloride 0.9% 250 mL IVPB      -- IV Every 12 hours (non-standard times) 02/22/18 0838    02/21/18 1530  meropenem injection 1 g      -- IV Every 8 hours (non-standard times) 02/21/18 1512              OBJECTIVE:     Vital Signs (Most Recent)  Temp: 98.8 °F (37.1 °C) (02/22/18 1101)  Pulse: 88 (02/22/18 1145)  Resp: 20 (02/22/18 1145)  BP: (!) 100/57 (02/22/18 1100)  SpO2: (!) 90 % (02/22/18 1145)    Temperature Range Min/Max (Last 24H):  Temp:  [98.1 °F (36.7 °C)-103.9 °F (39.9 °C)]     I & O (Last 24H):  Intake/Output Summary (Last 24 hours) at 02/22/18 1155  Last data filed at 02/22/18 0800   Gross per 24 hour   Intake           895.42 ml   Output             3504 ml   Net         -2608.58 ml       Lines/Drains:       Percutaneous Central Line Insertion/Assessment - triple lumen  02/22/18 0914 right internal jugular (Active)            Peripheral IV - Single Lumen 02/20/18 1105 Right Hand (Active)   Site Assessment Clean;Dry;Intact;No redness;No swelling 2/22/2018  7:01 AM   Line Status Saline locked 2/22/2018  7:01 AM   Dressing Status Clean;Dry;Intact 2/22/2018  7:01 AM   Dressing Change Due 02/24/18 2/22/2018  7:01 AM   Site Change Due 02/24/18 2/22/2018  7:01 AM   Reason Not Rotated Not due 2/22/2018  7:01 AM            Peripheral IV - Single Lumen 02/20/18 1400 Left Foot (Active)   Site Assessment Clean;Dry;Intact;No redness;No swelling 2/22/2018  7:01 AM   Line Status Saline locked 2/22/2018  7:01 AM   Dressing Status Clean;Dry;Intact 2/22/2018  7:01 AM   Dressing Change Due 02/24/18 2/22/2018  7:01 AM   Site Change Due 02/24/18 2/22/2018  7:01 AM   Reason Not Rotated Not due 2/22/2018  7:01 AM            Peripheral IV - Single Lumen 02/20/18 1410 Right  Foot (Active)   Site Assessment Clean;Dry;Intact;No redness;No swelling 2/22/2018  7:01 AM   Line Status Infusing 2/22/2018  7:01 AM   Dressing Status Clean;Dry;Intact 2/22/2018  7:01 AM   Dressing Change Due 02/24/18 2/22/2018  7:01 AM   Site Change Due 02/24/18 2/22/2018  7:01 AM   Reason Not Rotated Not due 2/22/2018  7:01 AM            NG/OG Tube 02/18/18 0930 16 Fr. Center mouth (Active)   Placement Check placement verified by x-ray 2/22/2018  3:15 AM   Tube advanced (cm) 65 2/19/2018  4:00 PM   Tolerance no signs/symptoms of discomfort 2/22/2018  3:15 AM   Securement taped to commercial device 2/22/2018  3:15 AM   Clamp Status/Tolerance clamped 2/22/2018  3:15 AM   Suction Setting/Drainage Method suction at;low;intermittent setting 2/19/2018 12:00 PM   Insertion Site Appearance no redness, warmth, tenderness, skin breakdown, drainage 2/21/2018  3:15 PM   Drainage Bile 2/19/2018 12:00 PM   Flush/Irrigation flushed w/;water;no resistance met 2/21/2018  3:15 PM   Feeding Method continuous 2/20/2018  7:15 AM   Current Rate (mL/hr) 30 mL/hr 2/20/2018  7:15 AM   Goal Rate (mL/hr) 50 mL/hr 2/20/2018  7:15 AM   Intake (mL) 30 mL 2/20/2018  7:30 PM   Water Bolus (mL) 150 mL 2/20/2018  7:15 AM   Tube Output(mL)(Include Discarded Residual) 50 mL 2/19/2018  4:00 PM   Intake (mL) - Formula Tube Feeding 30 2/20/2018  6:00 AM   Residual Amount (ml) 0 ml 2/20/2018 11:15 AM       Laboratory:  Recent Results (from the past 24 hour(s))   ISTAT PROCEDURE    Collection Time: 02/22/18  5:14 AM   Result Value Ref Range    POC PH 7.432 7.35 - 7.45    POC PCO2 47.4 (H) 35 - 45 mmHg    POC PO2 62 (L) 80 - 100 mmHg    POC HCO3 31.6 (H) 24 - 28 mmol/L    POC BE 7 -2 to 2 mmol/L    POC SATURATED O2 92 (L) 95 - 100 %    POC TCO2 33 (H) 23 - 27 mmol/L    Rate 20     Sample ARTERIAL     Site RR     Allens Test Pass     DelSys Adult Vent     Mode AC/PRVC     Vt 450     PEEP 10     FiO2 70    CBC auto differential    Collection Time: 02/22/18   5:15 AM   Result Value Ref Range    WBC 4.05 3.90 - 12.70 K/uL    RBC 4.39 (L) 4.60 - 6.20 M/uL    Hemoglobin 11.7 (L) 14.0 - 18.0 g/dL    Hematocrit 34.7 (L) 40.0 - 54.0 %    MCV 79 (L) 82 - 98 fL    MCH 26.7 (L) 27.0 - 31.0 pg    MCHC 33.7 32.0 - 36.0 g/dL    RDW 18.8 (H) 11.5 - 14.5 %    Platelets 57 (L) 150 - 350 K/uL    MPV SEE COMMENT 9.2 - 12.9 fL    Lymph # CANCELED 1.0 - 4.8 K/uL    Mono # CANCELED 0.3 - 1.0 K/uL    Eos # CANCELED 0.0 - 0.5 K/uL    Baso # CANCELED 0.00 - 0.20 K/uL    Gran% 77.0 (H) 38.0 - 73.0 %    Lymph% 14.0 (L) 18.0 - 48.0 %    Mono% 2.0 (L) 4.0 - 15.0 %    Eosinophil% 0.0 0.0 - 8.0 %    Basophil% 0.0 0.0 - 1.9 %    Bands 7.0 %    Platelet Estimate Decreased (A)     Differential Method Automated    Comprehensive metabolic panel    Collection Time: 02/22/18  5:15 AM   Result Value Ref Range    Sodium 145 136 - 145 mmol/L    Potassium 3.9 3.5 - 5.1 mmol/L    Chloride 106 95 - 110 mmol/L    CO2 27 23 - 29 mmol/L    Glucose 83 70 - 110 mg/dL    BUN, Bld 41 (H) 6 - 20 mg/dL    Creatinine 1.7 (H) 0.5 - 1.4 mg/dL    Calcium 8.3 (L) 8.7 - 10.5 mg/dL    Total Protein 6.5 6.0 - 8.4 g/dL    Albumin 1.9 (L) 3.5 - 5.2 g/dL    Total Bilirubin 1.0 0.1 - 1.0 mg/dL    Alkaline Phosphatase 68 55 - 135 U/L     (H) 10 - 40 U/L    ALT 30 10 - 44 U/L    Anion Gap 12 8 - 16 mmol/L    eGFR if African American 50 (A) >60 mL/min/1.73 m^2    eGFR if non African American 43 (A) >60 mL/min/1.73 m^2   Protime-INR    Collection Time: 02/22/18  5:15 AM   Result Value Ref Range    Prothrombin Time 10.5 9.0 - 12.5 sec    INR 1.0 0.8 - 1.2       Micro:  Microbiology Results (last 7 days)     Procedure Component Value Units Date/Time    Culture, VAP (BAL) [[id number]] Collected:  02/22/18 1023    Order Status:  Sent Specimen:  Sputum from BAL, RLL Updated:  02/22/18 1031    Urine culture [[id number]] Collected:  02/20/18 1345    Order Status:  Completed Specimen:  Urine from Urine, Catheterized Updated:  02/22/18 0828      Urine Culture, Routine No growth    Blood culture [[id number]] Collected:  02/18/18 1215    Order Status:  Completed Specimen:  Blood Updated:  02/21/18 1503     Blood Culture, Routine No Growth to date     Blood Culture, Routine No Growth to date     Blood Culture, Routine No Growth to date     Blood Culture, Routine No Growth to date    Blood culture [[id number]] Collected:  02/18/18 1210    Order Status:  Completed Specimen:  Blood Updated:  02/21/18 1503     Blood Culture, Routine No Growth to date     Blood Culture, Routine No Growth to date     Blood Culture, Routine No Growth to date     Blood Culture, Routine No Growth to date    Culture, Respiratory with Gram Stain [[id number]] Collected:  02/18/18 0925    Order Status:  Completed Specimen:  Respiratory from Endotracheal Aspirate Updated:  02/20/18 0842     Respiratory Culture Normal respiratory rosie     Gram Stain (Respiratory) <10 epithelial cells per low power field.     Gram Stain (Respiratory) Moderate WBC's     Gram Stain (Respiratory) No organisms seen    Blood culture [[id number]] Collected:  02/14/18 2140    Order Status:  Completed Specimen:  Blood Updated:  02/19/18 2303     Blood Culture, Routine No growth after 5 days.    Blood culture [[id number]] Collected:  02/14/18 2120    Order Status:  Completed Specimen:  Blood Updated:  02/19/18 2303     Blood Culture, Routine No growth after 5 days.    AFB Culture & Smear [[id number]] Collected:  02/17/18 1135    Order Status:  Completed Specimen:  Respiratory from Sputum, Expectorated Updated:  02/19/18 2127     AFB Culture & Smear Culture in progress     AFB CULTURE STAIN No acid fast bacilli seen.    Blood culture [[id number]] Collected:  02/12/18 1755    Order Status:  Completed Specimen:  Blood from Peripheral, Hand, Left Updated:  02/17/18 2103     Blood Culture, Routine No growth after 5 days.    Blood culture [[id number]] Collected:  02/12/18 1740    Order Status:  Completed Specimen:  Blood from  Peripheral, Antecubital, Left Updated:  02/17/18 2103     Blood Culture, Routine No growth after 5 days.    Clostridium difficile EIA [[id number]] Collected:  02/16/18 1550    Order Status:  Completed Specimen:  Stool from Stool Updated:  02/17/18 1433     C. diff Antigen Negative     C difficile Toxins A+B, EIA Negative     Comment: Testing not recommended for children <24 months old.       AFB Culture & Smear [[id number]]     Order Status:  No result Specimen:  Respiratory from Sputum, Expectorated     AFB Culture & Smear [[id number]]     Order Status:  No result Specimen:  Respiratory     AFB Culture & Smear [[id number]] Collected:  02/13/18 1130    Order Status:  Completed Specimen:  Respiratory from Sputum, Induced Updated:  02/15/18 1529     AFB Culture & Smear Culture in progress     AFB CULTURE STAIN No acid fast bacilli seen.              ASSESSMENT/PLAN:     Active Hospital Problems    Diagnosis  POA    *Acute respiratory failure with hypoxia and hypercarbia [J96.01, J96.02]  Yes    Acute renal failure [N17.9]  Yes    Malnutrition of moderate degree [E44.0]  Yes    GIB (gastrointestinal bleeding) [K92.2]  Yes    Septic shock [A41.9, R65.21]  Yes    Chronic hepatitis C virus infection [B18.2]  Yes    Pneumonia of left lower lobe due to infectious organism [J18.1]  Yes    Aspiration pneumonia [J69.0]  Yes    Pancytopenia [D61.818]  Yes    Hypokalemia [E87.6]  Yes    Essential hypertension [I10]  Yes     Chronic      Resolved Hospital Problems    Diagnosis Date Resolved POA   No resolved problems to display.           Physical Exam:  Gen: NAD  Pul: CTA   Cardio:  +S1+S2  Abd: Soft, NT  Ext: No edema  Skin: No active lesions        IMPRESSION;   1) Fever of unclear etiology.  2) HepC antibody (RNA confirmation pending).  3) Multilobar PNA.  4) Pancytopenia.   5) YOLI.     RECOMMENDATIONS;   1) I started the pt on viktor yesterday- will cont with this.   2) YOLI is worsening... Will d/c the vanc and start  linezolid.   3) Blood cx's remain negative.   4) Fever w/u is negative... Check for DVTs?  5) We will cont to follow.     THE CHART IS LOCKED AND I CANNOT START THE LINEZOLID... PLEASE D/C VANC AND START LINEZOLID 600mg IV Q12.

## 2018-02-22 NOTE — PROGRESS NOTES
Pt placed on transport vent to go to ct.Pt dropped sats to 83%the patient on 100% fio2 on transport vent sats 84%the patient placed back on servoi vent sats 89-90%.

## 2018-02-22 NOTE — PROGRESS NOTES
[facility]  Pulmonology  Progress Note    Patient Name: Dwight Bolton  MRN: [id number]  Admission Date: 2/12/2018  Hospital Length of Stay: 9 days  Code Status: Full Code  Attending Provider: Miriam Horan MD  Primary Care Provider: Primary Doctor No   Principal Problem: Acute respiratory failure with hypoxia and hypercarbia    Subjective:     Interval History: Febrile. Still on fair amount of ventilator support.     Review of Systems   Unable to perform ROS: Intubated     Objective:     Vital Signs (Most Recent):  Temp: (!) 103.9 °F (39.9 °C) (02/21/18 1724)  Pulse: (!) 112 (02/21/18 1815)  Resp: (!) 36 (02/21/18 1815)  BP: 127/60 (02/21/18 1815)  SpO2: (!) 90 % (02/21/18 1815) Vital Signs (24h Range):  Temp:  [99 °F (37.2 °C)-103.9 °F (39.9 °C)] 103.9 °F (39.9 °C)  Pulse:  [] 112  Resp:  [13-44] 36  SpO2:  [88 %-96 %] 90 %  BP: ()/(51-82) 127/60     Weight: 106.8 kg (235 lb 7.2 oz)  Body mass index is 36.88 kg/m².    Intake/Output Summary (Last 24 hours) at 02/21/18 1850  Last data filed at 02/21/18 1800   Gross per 24 hour   Intake          1943.02 ml   Output             6704 ml   Net         -4760.98 ml      Physical Exam   Constitutional: He appears well-developed.   Dishoveled   HENT:   Head: Normocephalic and atraumatic.   ET tube in place   Eyes: Conjunctivae are normal. Right eye exhibits no discharge. Left eye exhibits no discharge.   Neck: Normal range of motion.   Cardiovascular: Normal rate and regular rhythm.  Exam reveals no gallop and no friction rub.    No murmur heard.  Tachycardic   Pulmonary/Chest:   Course ventilated breath sounds that are diminished throughout, some rhonchi and + crackles at bases   Abdominal: Soft. Bowel sounds are normal.   Musculoskeletal: Normal range of motion. He exhibits edema.   1+ edema to LE   Neurological:   Sedated on vent   Skin: Skin is warm and dry. No erythema.   Psychiatric: He has a normal mood and affect. His behavior is  normal. Judgment and thought content normal.   Nursing note and vitals reviewed.      Significant Labs: All pertinent labs within the past 24 hours have been reviewed.    Significant Imaging: I have reviewed and interpreted all pertinent imaging results/findings within the past 24 hours.    Assessment/Plan:     * Acute respiratory failure with hypoxia and hypercarbia    Intubated for worsening respiratory distress. Multilobar PNA. Most likely aspiration PNA. On FiO2 60% and PEEP 10.   -Low Vt ventilation.   -Wean Vent according to ARDSnet protocol  -Daily awakening trial.   -Broad spectrum Abx  -Continue diuresing patient. He has received a fair amount of blood products and IVF.   Recommend CT chest without contrast given fevers without ventilator improvement. May consider   Bronchoscopy if no improvement.         Acute renal failure    Recommend diuresis. Likely ATN secondary to sepsis/shock.           Critical Care time: 35 minutes.     Critical Care time for the evaluation and treatment of severe organ dysfunction, review of pertinent labs and imaging studies and discussions with primary team.        Ivan Esquivel MD  Pulmonology  [facility]

## 2018-02-22 NOTE — SUBJECTIVE & OBJECTIVE
Interval History: Febrile. Still on fair amount of ventilator support.     Review of Systems   Unable to perform ROS: Intubated     Objective:     Vital Signs (Most Recent):  Temp: (!) 103.9 °F (39.9 °C) (02/21/18 1724)  Pulse: (!) 112 (02/21/18 1815)  Resp: (!) 36 (02/21/18 1815)  BP: 127/60 (02/21/18 1815)  SpO2: (!) 90 % (02/21/18 1815) Vital Signs (24h Range):  Temp:  [99 °F (37.2 °C)-103.9 °F (39.9 °C)] 103.9 °F (39.9 °C)  Pulse:  [] 112  Resp:  [13-44] 36  SpO2:  [88 %-96 %] 90 %  BP: ()/(51-82) 127/60     Weight: 106.8 kg (235 lb 7.2 oz)  Body mass index is 36.88 kg/m².    Intake/Output Summary (Last 24 hours) at 02/21/18 1850  Last data filed at 02/21/18 1800   Gross per 24 hour   Intake          1943.02 ml   Output             6704 ml   Net         -4760.98 ml      Physical Exam   Constitutional: He appears well-developed.   Dishoveled   HENT:   Head: Normocephalic and atraumatic.   ET tube in place   Eyes: Conjunctivae are normal. Right eye exhibits no discharge. Left eye exhibits no discharge.   Neck: Normal range of motion.   Cardiovascular: Normal rate and regular rhythm.  Exam reveals no gallop and no friction rub.    No murmur heard.  Tachycardic   Pulmonary/Chest:   Course ventilated breath sounds that are diminished throughout, some rhonchi and + crackles at bases   Abdominal: Soft. Bowel sounds are normal.   Musculoskeletal: Normal range of motion. He exhibits edema.   1+ edema to LE   Neurological:   Sedated on vent   Skin: Skin is warm and dry. No erythema.   Psychiatric: He has a normal mood and affect. His behavior is normal. Judgment and thought content normal.   Nursing note and vitals reviewed.      Significant Labs: All pertinent labs within the past 24 hours have been reviewed.    Significant Imaging: I have reviewed and interpreted all pertinent imaging results/findings within the past 24 hours.

## 2018-02-22 NOTE — PROGRESS NOTES
[facility]  Hematology/Oncology  Progress Note    Patient Name: Dwight Bolton  Admission Date: 2018  Hospital Length of Stay: 10 days  Code Status: Full Code     Subjective:     Interval History:     2018: continue to have elevated temp. Hypoxia on vent.   2018: Continue to have fever.   2018: remained intubated in icu. +  fever   2019: developed respiratory failure and septic shock leading to icu admission. Off Granix   2018: states feeling better. Fever improving        Oncology Treatment Plan:/   [No treatment plan]    Medications:  Continuous Infusions:   fentanyl 10 mL/hr at 18    propofol 29.963 mcg/kg/min (18)     Scheduled Meds:   albuterol-ipratropium 2.5mg-0.5mg/3mL  3 mL Nebulization Q6H    chlorhexidine  15 mL Mouth/Throat BID    cyanocobalamin  1,000 mcg Per OG tube Daily    folic acid  1 mg Per OG tube Daily    meropenem (MERREM) IVPB  1 g Intravenous Q8H    pantoprazole  40 mg Intravenous BID    pneumoc 13-serafin conj-dip cr(PF)  0.5 mL Intramuscular Once     PRN Meds:sodium chloride, sodium chloride, sodium chloride, sodium chloride, acetaminophen, albuterol-ipratropium 2.5mg-0.5mg/3mL, cloNIDine, [] fentaNYL **FOLLOWED BY** fentaNYL, gelatin adsorbable 100cm top sponge, influenza, ondansetron, promethazine (PHENERGAN) IVPB, ramelteon, senna-docusate 8.6-50 mg     Review of Systems     Unable to obtain- pt in icu on a vent     Objective:     Vital Signs (Most Recent):  Temp: 100 °F (37.8 °C) (18 0547)  Pulse: 105 (18)  Resp: (!) 22 (18)  BP: (!) 104/58 (18)  SpO2: (!) 92 % (18) Vital Signs (24h Range):  Temp:  [98.1 °F (36.7 °C)-103.9 °F (39.9 °C)] 100 °F (37.8 °C)  Pulse:  [] 105  Resp:  [6-36] 22  SpO2:  [87 %-95 %] 92 %  BP: ()/(50-69) 104/58     Weight: 106.8 kg (235 lb 7.2 oz)  Body mass index is 36.88 kg/m².  Body surface area is 2.25 meters  squared.      Intake/Output Summary (Last 24 hours) at 02/22/18 0746  Last data filed at 02/22/18 0600   Gross per 24 hour   Intake            927.2 ml   Output             4354 ml   Net          -3426.8 ml       Physical Exam    General: In ICU on a vent.   Head: normocephalic, atraumatic   Eyes: conjunctivae pale    Throat: ET tube   Neck: supple, no LAD   Lungs: rhonchi and crackles bilaterally. Decreased BS R compared to left   Heart: S1, S2, RR   Abdomen: + BS x 4 quadrants, soft, non tender.   Extremities: no cyanosis or edema.   Skin: turgor normal, no erythema or rashes.  Neuro: open eyes     Significant Labs:   CBC:     Recent Labs  Lab 02/21/18  0433 02/22/18  0515   WBC 3.23* 4.05   HGB 12.1* 11.7*   HCT 35.6* 34.7*   PLT 58* 57*   , CMP:     Recent Labs  Lab 02/21/18  0433 02/22/18  0515    145   K 3.9 3.9    106   CO2 28 27    83   BUN 43* 41*   CREATININE 1.8* 1.7*   CALCIUM 8.5* 8.3*   PROT 6.8 6.5   ALBUMIN 2.3* 1.9*   BILITOT 0.8 1.0   ALKPHOS 64 68   * 162*   ALT 35 30   ANIONGAP 11 12   EGFRNONAA 40* 43*   , Coagulation:     Recent Labs  Lab 02/21/18  0433 02/22/18  0515   INR 0.9 1.0   , Haptoglobin: No results for input(s): HAPTOGLOBIN in the last 48 hours., Immunology: No results for input(s): SPEP, MINAL, LUCIEN, FREELAMBDALI in the last 48 hours., LDH: No results for input(s): LDHCSF, BFSOURCE in the last 48 hours., Reticulocytes: No results for input(s): RETIC in the last 48 hours., Tumor Markers: No results for input(s): PSA, CEA, , AFPTM, PL5021,  in the last 48 hours.    Invalid input(s): ALGTM, Uric Acid No results for input(s): URICACID in the last 48 hours. and Urine Studies:     Recent Labs  Lab 02/20/18  1345   COLORU Straw   APPEARANCEUA Hazy*   PHUR 5.0   SPECGRAV 1.010   PROTEINUA Negative   GLUCUA Negative   KETONESU Negative   BILIRUBINUA Negative   OCCULTUA 2+*   NITRITE Negative   UROBILINOGEN Negative   LEUKOCYTESUR Negative   RBCUA 2   BACTERIA  Moderate*   HYALINECASTS 1       Diagnostic Results:      Assessment/Plan:     Active Diagnoses:    Diagnosis Date Noted POA    PRINCIPAL PROBLEM:  Acute respiratory failure with hypoxia and hypercarbia [J96.01, J96.02] 02/18/2018 Yes    Acute renal failure [N17.9] 02/19/2018 Yes    Malnutrition of moderate degree [E44.0] 02/19/2018 Yes    GIB (gastrointestinal bleeding) [K92.2] 02/18/2018 Yes    Septic shock [A41.9, R65.21] 02/18/2018 Yes    Chronic hepatitis C virus infection [B18.2] 02/15/2018 Yes    Pneumonia of left lower lobe due to infectious organism [J18.1] 02/14/2018 Yes    Aspiration pneumonia [J69.0] 02/12/2018 Yes    Pancytopenia [D61.818] 02/12/2018 Yes    Hypokalemia [E87.6] 02/12/2018 Yes    Essential hypertension [I10] 02/12/2018 Yes     Chronic      Problems Resolved During this Admission:    Diagnosis Date Noted Date Resolved POA       Mr. Bolton, 58 YO man with possible CAP found to have pancytopenia. Low albumin, elevated AST      1. Pancytopenia: multifactorial including infection, malnutrition.               - worsening wbc- ANC    - Neutropenic isolation              - Granix 300 mcg daily until ANC > 1500: Discontinued               - s/p prbc transfusion               - elevated  LDH              - Hep C +   - pt has inappropriately low retic- pt has underlying bone marrow pathology   - may need BM bx- can be done out pt once acute infectious issues resolves   - also Hep C causing cytopenia- especially low platelets   - s/p  6 units prbc   - continue to have fever        2. CAP: now with sepsis and resp failure     3. Acute hypoxic respiratory failure - on vent    - pulmonary following    - worsening pulmonary status: CXR appear to have TRALI    - start steroid tx     4. Fever: in the context of pancytopenia - broadened coverage   - ? Need for antifungal coverage   - ID following pt   - Vanc on hold due to high trough    - ABX upgraded    - need CT of chest/ab/pelvis       Anthony  HANNY Ramachandran  Internal Medicine & Geriatric Medicine  Hematology & Oncology  Palliative Medicine    [address], [address]  [place], [place] [id number] [phone] (Office)  [phone] (Fax)

## 2018-02-22 NOTE — PROGRESS NOTES
Received pt on noted vent settings. Alarms are set and functional. Ambu bag at [facility]. Oral ett moved from center to right measured at the 25cm iftikhar. +BBS Clear. HME and youssef changed. Pt tolerated vent well. No skin breakdown noted at lips or mouth. No distress noted at that time

## 2018-02-22 NOTE — NURSING
Dr. Stephenson notified that the patient became unstable when placed on transport vent for CT and that the CT scan was unobtainable.

## 2018-02-22 NOTE — PROGRESS NOTES
"[facility] Hospital Medicine  Progress Note    Patient Name: Dwight Bolton  MRN: [id number]  Patient Class: IP- Inpatient   Admission Date: 2/12/2018  Length of Stay: 9 days  Attending Physician: Miriam Horan MD  Primary Care Provider: Primary Doctor No        Subjective:     Principal Problem:Acute respiratory failure with hypoxia and hypercarbia    HPI:  Mr. Dwight Bolton is a 57 y.o. male with essential hypertension who presents to [facility] ED with complaints of fall today.  He has been feeling weak "for a while" and is always short of breath but says that he came here because he had a fall today.  He denies any loss of consciousness, lightheadedness, nor did he sustain any head trauma.  He denies any antecedent chest pain or palpitations, but he does say that he is always short of breath and it's not any worse than usual.  He denies any fever, chills, nausea, vomiting, nor headaches.  He decided to come here when he couldn't stand back up.  He is otherwise a very poor historian.    Hospital Course:  Mr. Bolton is a homeless man who presented with weakness and falling, and admitted with LLL pneumonia treated with Rocephin/Azithromycin. He was septic with persistent fever and pancytopenia. Workup remarkable for Hep C+ and he is HIV negative. He never complained of hemoptysis, but given persistent fever, TB workup was done with AFB smears negative thus far and PPD negative. Quantiferon gold pending. Pancytopenia workup in progress by Hem/Onc with low retic count suspicious for marrow failure and he was given Granix with improvement in his neutropenia. ID consulted with antibiotic regimen changed to   Cefepime on 2/17 and continued on Vancomycin started on 2/14, and Azithromycin restarted on 2/17. Pt was on respiratory isolation. Transferred to the ICU on 2/17 for continued respiratory decompensation on BIPAP. On 2/18, he failed BIPAP and had to be intubated and placed on vent. Rectal tube returned " blood and he was transfused 1U platelets and 2U PRBC as well and GI was consulted. Central line was placed and he required pressor support with levophed.    Interval History: pancytopenia better. No longer neutropenic. Persistently febrile however and oxygen demand actually increased despite diuresis.     Review of Systems   Unable to perform ROS: Intubated     Objective:     Vital Signs (Most Recent):  Temp: 100.1 °F (37.8 °C) (02/20/18 1500)  Pulse: 93 (02/20/18 1515)  Resp: 17 (02/20/18 1515)  BP: (!) 169/77 (02/20/18 1500)  SpO2: 95 % (02/20/18 1515) Vital Signs (24h Range):  Temp:  [99 °F (37.2 °C)-103.9 °F (39.9 °C)] 103.9 °F (39.9 °C)  Pulse:  [] 112  Resp:  [13-44] 36  SpO2:  [88 %-96 %] 90 %  BP: ()/(51-82) 127/60     Weight: 106.8 kg (235 lb 7.2 oz)  Body mass index is 36.88 kg/m².    Intake/Output Summary (Last 24 hours) at 02/21/18 1823  Last data filed at 02/21/18 1800   Gross per 24 hour   Intake          1916.32 ml   Output             6704 ml   Net         -4787.68 ml      Physical Exam   Constitutional: He appears well-developed.   Disheveled, unkept   HENT:   Head: Normocephalic and atraumatic.   ET tube in place   Eyes: Conjunctivae are normal. Right eye exhibits no discharge. Left eye exhibits no discharge.   Neck: Normal range of motion.   Cardiovascular: Normal rate and regular rhythm.  Exam reveals no gallop and no friction rub.    No murmur heard.  Pulmonary/Chest:   Course ventilated breath sounds that are diminished throughout, some rhonchi and + crackles at bases   Abdominal: Soft. Bowel sounds are normal.   Musculoskeletal: Normal range of motion. He exhibits edema.   1+ edema throughtout   Neurological:   Sedated on vent   Skin: Skin is warm and dry. No erythema.   Burn marks on palmar aspect left thumb   Psychiatric: He has a normal mood and affect. His behavior is normal. Judgment and thought content normal.   Nursing note and vitals reviewed.      Significant Labs: All  pertinent labs within the past 24 hours have been reviewed.    Significant Imaging: I have reviewed and interpreted all pertinent imaging results/findings within the past 24 hours.    Assessment/Plan:      * Acute respiratory failure with hypoxia and hypercarbia    Pt with multilobar pneumonia, likely aspiration from the history  Immunocompromised state with neutropenia and malnutrition. Unfortunately no tox screen obtained in the ED but has possible track marks on dorsum aspect of left hand and a burn without open wound on left thumb.    Continued respiratory decompensation with increased work of breathing and increasing pCO2  Intubated and placed on vent on 2/18  All cultures negative and quant gold interm  Pulmonary following and managing vent. ID on board for abx management  Still high O2 requirements at PEEP of 10 and FIO2 70% despite diuresis. Attempted CT of chest to re-eval pleural effusions vs new intrathoracic pathology however he did not tolerate portable vent. Abx just broadened today. No vasopressor support required at this time        Malnutrition of moderate degree    Will start tube feeds tomorrow if continues to be stable from GI bleed standpoint. PPI changed to BID        Acute renal failure    Likely due to prerenal etiology with hypoperfusion in setting of septic shock. Shock resolved and is not on vasopressor support  Stop IVF as patient is hypervolemic. Trial of lasix. Strict I/Os  Will monitor        Septic shock    Pt met criteria for sepsis given fever, tachycardia, leukopenia, and pneumonia, likely aspiration  Continue antibiotics as per ID  Weaned off vasopressor support  Blood cultures from 2/12 negative final,  from 2/14 NGTD and from 2/18 NGTD          GIB (gastrointestinal bleeding)    Had low platelets and bleeding from rectal tube after placement  S/p transfusion of 2U platelets   S/p transfusion of 4U PRBC  Stool in flexi seal does not appear bloody nor melanotic finally after  "platelet transfusion  GI following. Appreciate input          Chronic hepatitis C virus infection    Reportedly patient was aware of "cirrhosis" diagnosis but he does not reportedly drink ETOH  No previous report of Hep C until this admission with workup with hepatitis panel  Viral RNA of 75  LFTs with mild elevation  Synthetic function of liver normal, and CT scan with no gross abnormality seen in the liver  Will follow LFTs        Pneumonia of left lower lobe due to infectious organism    As discussed above        Essential hypertension    All anti-HTN medications for now.         Hypokalemia    Corrected to normal  Monitor        Pancytopenia    Rapid HIV is negative  Hep C + with RNA of 75. Also folate, iron and B12 deficient. Combination of these causing marrow failure?  Pt followed by Hem/Onc and s/p Granix for neutropenia x 1. Neutropenia improved since. ANC 1600  Platelets low and with active bleeding s/p transfusion 1U of platelets on 2/18  S/p transfusion of 4U PRBC and 2U of plts. Now stable and pancytopenia much improved   Iron given with PRBCs. Continue folic acid and B12 supplementation   Continue to monitor        Aspiration pneumonia    Initially treated for CAP  Likely to be aspiration pneumonia complicated by immunocompromised state  Antibiotics broadened recently by ID          VTE Risk Mitigation         Ordered     Medium Risk of VTE  Once      02/12/18 2212        Failed attempt to obtain CT of chest. Will give new broadened abx a chance to see if it would take care of patient's fever. Appreciate ID's input    Critical care time spent on the evaluation and treatment of severe organ dysfunction, review of pertinent labs and imaging studies, discussions with consulting providers and discussions with patient/family: > 45 minutes.    Miriam Stephenson MD  Department of Hospital Medicine   [facility]  "

## 2018-02-22 NOTE — SUBJECTIVE & OBJECTIVE
Interval History: Afebrile this morning. Increased ventilator requirement despite diuresis.     Review of Systems   Unable to perform ROS: Intubated     Objective:     Vital Signs (Most Recent):  Temp: 98.8 °F (37.1 °C) (02/22/18 1101)  Pulse: 87 (02/22/18 1215)  Resp: 20 (02/22/18 1215)  BP: 104/62 (02/22/18 1200)  SpO2: (!) 93 % (02/22/18 1215) Vital Signs (24h Range):  Temp:  [98.1 °F (36.7 °C)-103.9 °F (39.9 °C)] 98.8 °F (37.1 °C)  Pulse:  [] 87  Resp:  [6-36] 20  SpO2:  [83 %-96 %] 93 %  BP: ()/(50-69) 104/62     Weight: 106.8 kg (235 lb 7.2 oz)  Body mass index is 36.88 kg/m².    Intake/Output Summary (Last 24 hours) at 02/22/18 1344  Last data filed at 02/22/18 1200   Gross per 24 hour   Intake          1203.44 ml   Output             2315 ml   Net         -1111.56 ml      Physical Exam   Constitutional: He appears well-developed.   Dishoveled   HENT:   Head: Normocephalic and atraumatic.   ET tube in place   Eyes: Conjunctivae are normal. Right eye exhibits no discharge. Left eye exhibits no discharge.   Neck: Normal range of motion.   Cardiovascular: Normal rate and regular rhythm.  Exam reveals no gallop and no friction rub.    No murmur heard.  Tachycardic   Pulmonary/Chest:   Course ventilated breath sounds that are diminished throughout, some rhonchi and + crackles at bases   Abdominal: Soft. Bowel sounds are normal.   Musculoskeletal: Normal range of motion. He exhibits edema.   1+ edema to LE   Neurological:   Sedated on vent   Skin: Skin is warm and dry. No erythema.   Psychiatric: He has a normal mood and affect. His behavior is normal. Judgment and thought content normal.   Nursing note and vitals reviewed.      Significant Labs: All pertinent labs within the past 24 hours have been reviewed.    Significant Imaging: I have reviewed and interpreted all pertinent imaging results/findings within the past 24 hours.

## 2018-02-22 NOTE — PROCEDURES
"Dwight Bolton is a 58 y.o. male patient.    Temp: (!) 100.8 °F (38.2 °C) (02/22/18 0701)  Pulse: 109 (02/22/18 0815)  Resp: (!) 28 (02/22/18 0815)  BP: (!) 114/57 (02/22/18 0800)  SpO2: (!) 88 % (02/22/18 0815)  Weight: 106.8 kg (235 lb 7.2 oz) (02/19/18 1000)  Height: 5' 7" (170.2 cm) (02/19/18 1000)       Central Line  Date/Time: 2/22/2018 10:49 AM  Location procedure was performed: [facility]  Performed by: IVAN ESQUIVEL  Consent Done: Emergent Situation  Time out: Immediately prior to procedure a "time out" was called to verify the correct patient, procedure, equipment, support staff and site/side marked as required.  Indications: med administration  Anesthesia: see MAR for details  Preparation: skin prepped with ChloraPrep  Skin prep agent dried: skin prep agent completely dried prior to procedure  Sterile barriers: all five maximum sterile barriers used - cap, mask, sterile gown, sterile gloves, and large sterile sheet  Hand hygiene: hand hygiene performed prior to central venous catheter insertion  Location details: right internal jugular  Catheter type: triple lumen  Catheter size: 7 Fr  Catheter Length: 16cm    Ultrasound guidance: yes  Vessel Caliber: medium, compressibility normal  Needle advanced into vessel with real time Ultrasound guidance.  Guidewire confirmed in vessel.  Sterile sheath used.  Number of attempts: 1  Assessment: placement verified by x-ray,  no pneumothorax on x-ray and successful placement  Complications: none  Specimens: No  Implants: No  Post-procedure: chlorhexidine patch,  line sutured,  sterile dressing applied and blood return through all ports  Complications: No          Ivan Esquivel  2/22/2018  "

## 2018-02-22 NOTE — ASSESSMENT & PLAN NOTE
Will start tube feeds tomorrow if continues to be stable from GI bleed standpoint. PPI changed to BID

## 2018-02-22 NOTE — PROGRESS NOTES
[facility]  Pulmonology  Progress Note    Patient Name: Dwight Bolton  MRN: [id number]  Admission Date: 2/12/2018  Hospital Length of Stay: 10 days  Code Status: Full Code  Attending Provider: Miriam Horan MD  Primary Care Provider: Primary Doctor No   Principal Problem: Acute respiratory failure with hypoxia and hypercarbia    Subjective:     Interval History: Afebrile this morning. Increased ventilator requirement despite diuresis.     Review of Systems   Unable to perform ROS: Intubated     Objective:     Vital Signs (Most Recent):  Temp: 98.8 °F (37.1 °C) (02/22/18 1101)  Pulse: 87 (02/22/18 1215)  Resp: 20 (02/22/18 1215)  BP: 104/62 (02/22/18 1200)  SpO2: (!) 93 % (02/22/18 1215) Vital Signs (24h Range):  Temp:  [98.1 °F (36.7 °C)-103.9 °F (39.9 °C)] 98.8 °F (37.1 °C)  Pulse:  [] 87  Resp:  [6-36] 20  SpO2:  [83 %-96 %] 93 %  BP: ()/(50-69) 104/62     Weight: 106.8 kg (235 lb 7.2 oz)  Body mass index is 36.88 kg/m².    Intake/Output Summary (Last 24 hours) at 02/22/18 1344  Last data filed at 02/22/18 1200   Gross per 24 hour   Intake          1203.44 ml   Output             2315 ml   Net         -1111.56 ml      Physical Exam   Constitutional: He appears well-developed.   Dishoveled   HENT:   Head: Normocephalic and atraumatic.   ET tube in place   Eyes: Conjunctivae are normal. Right eye exhibits no discharge. Left eye exhibits no discharge.   Neck: Normal range of motion.   Cardiovascular: Normal rate and regular rhythm.  Exam reveals no gallop and no friction rub.    No murmur heard.  Tachycardic   Pulmonary/Chest:   Course ventilated breath sounds that are diminished throughout, some rhonchi and + crackles at bases   Abdominal: Soft. Bowel sounds are normal.   Musculoskeletal: Normal range of motion. He exhibits edema.   1+ edema to LE   Neurological:   Sedated on vent   Skin: Skin is warm and dry. No erythema.   Psychiatric: He has a normal mood and affect. His behavior is  normal. Judgment and thought content normal.   Nursing note and vitals reviewed.      Significant Labs: All pertinent labs within the past 24 hours have been reviewed.    Significant Imaging: I have reviewed and interpreted all pertinent imaging results/findings within the past 24 hours.    Assessment/Plan:     * Acute respiratory failure with hypoxia and hypercarbia    Intubated for worsening respiratory distress. Multilobar PNA. Patient also worsened after blood products. TRALI vs TACO? Patient has worsened since yesterday despite negative 1.1L fluid balance. Patchy consolidation on CXR   -Low Vt ventilation.   -Wean Vent according to ARDSnet protocol  -Broad spectrum Abx. ID following. Cr is 1.7 down from 1.8 yesterday. Consider keeping Vancomycin given patient's severe thrombocytopenia requiring platelet transfusion.   -Continue diuresing patient.   -Recommend CT chest without contrast.  -Consider adding Fungal coverage given patchy consolidative airspace disease with no improvement on ABx.    Note: Paralytics added to facilitate CT given dyssynchrony with the ventilator. Will turn off after CT.           Acute renal failure    Recommend diuresis. Likely ATN secondary to sepsis/shock. Cr improved from yesterday.           Critical Care time: 80 minutes excluding procedures.     Central line placed for vascular access. See procedure note.     Critical Care time for the for the evaluation and treatment of severe organ dysfunction, review of pertinent labs and imaging studies, discussions with primary team.     Recommend continued efforts to locate next of kin/family.        Ivan Esquivel MD  Pulmonology  [facility]

## 2018-02-22 NOTE — PROGRESS NOTES
ABG results are as follows   Results for GEENA BENJAMIN (MRN [id number]) as of 2/22/2018 05:31   Ref. Range 2/22/2018 05:14   POC PH Latest Ref Range: 7.35 - 7.45  7.432   POC PCO2 Latest Ref Range: 35 - 45 mmHg 47.4 (H)   POC PO2 Latest Ref Range: 80 - 100 mmHg 62 (L)   POC BE Latest Ref Range: -2 to 2 mmol/L 7   POC HCO3 Latest Ref Range: 24 - 28 mmol/L 31.6 (H)   POC SATURATED O2 Latest Ref Range: 95 - 100 % 92 (L)   POC TCO2 Latest Ref Range: 23 - 27 mmol/L 33 (H)   FiO2 Unknown 70   Vt Unknown 450   PEEP Unknown 10   Sample Unknown ARTERIAL   DelSys Unknown Adult Vent   Allens Test Unknown Pass   Site Unknown RR   Mode Unknown AC/PRVC

## 2018-02-22 NOTE — EICU
eICU Note : Ventilator Rounds Vent day 4  Input/Output:-2.5  CXR: Bilateral Fluffy infiltrates  On Mechanical ventilator :settings:   Mode:PRVC   RR 20  PEEP+ 10  FiO2 70%  MAP 20  SaO2 92%  Last AB.42/47/62/31.6/92  Checked Readiness to wean: P/F ratio 88  PEEP +10  Sedation on/off:Fentanyl and Propofol      Pertinent History and labs reviewed :58-year-old male admitted on  , with community-acquired pneumonia and septic shock, status post intubation      Treatment /Intervention given:SBT as tolerated, wean FiO2,?steroids        Saundra Gorman M.D  eICU Physician

## 2018-02-22 NOTE — PROGRESS NOTES
Chief Complaint / Reason for Consult: Rectal Bleeding    RN reports no further blood per rectum/rectal tube.  Pt. Remains on vent    ROS: Unable to obtain, secondary to current clinical condition    Patient Vitals for the past 24 hrs:   BP Temp Temp src Pulse Resp SpO2   02/22/18 1507 - - - 76 20 (!) 90 %   02/22/18 1457 - - - 76 20 (!) 90 %   02/22/18 1215 - - - 87 20 (!) 93 %   02/22/18 1200 104/62 - - 94 20 (!) 92 %   02/22/18 1145 - - - 88 20 (!) 90 %   02/22/18 1130 - - - 91 20 (!) 90 %   02/22/18 1115 - - - 94 20 (!) 90 %   02/22/18 1101 - 98.8 °F (37.1 °C) Esophageal 97 20 (!) 91 %   02/22/18 1100 (!) 100/57 - - 97 20 (!) 91 %   02/22/18 1045 - - - 101 20 (!) 91 %   02/22/18 1030 - - - 103 (!) 22 (!) 83 %   02/22/18 1015 - - - 102 (!) 23 96 %   02/22/18 1000 110/64 - - 103 (!) 21 96 %   02/22/18 0945 - - - 101 (!) 26 (!) 93 %   02/22/18 0930 - - - 104 (!) 23 (!) 94 %   02/22/18 0915 - - - 107 (!) 25 (!) 94 %   02/22/18 0900 (!) 141/67 - - 109 (!) 31 96 %   02/22/18 0845 - - - 109 18 (!) 93 %   02/22/18 0830 - - - 106 (!) 27 (!) 88 %   02/22/18 0815 - - - 109 (!) 28 (!) 88 %   02/22/18 0800 (!) 114/57 - - 108 (!) 26 (!) 90 %   02/22/18 0745 - - - (!) 112 (!) 31 (!) 93 %   02/22/18 0735 - - - 105 (!) 22 (!) 92 %   02/22/18 0730 - - - 104 (!) 22 (!) 91 %   02/22/18 0715 - - - 104 20 (!) 90 %   02/22/18 0701 (!) 103/59 (!) 100.8 °F (38.2 °C) Esophageal 102 20 (!) 91 %   02/22/18 0700 (!) 103/59 - - 103 (!) 22 (!) 90 %   02/22/18 0600 (!) 104/58 - - 101 19 (!) 89 %   02/22/18 0547 - 100 °F (37.8 °C) Esophageal 101 (!) 23 (!) 89 %   02/22/18 0514 - - - 104 (!) 28 (!) 89 %   02/22/18 0500 124/69 - - 105 19 (!) 88 %   02/22/18 0400 122/61 - - 99 (!) 24 (!) 93 %   02/22/18 0359 - - - 100 (!) 24 (!) 92 %   02/22/18 0322 113/60 99.3 °F (37.4 °C) - 100 (!) 22 (!) 93 %   02/22/18 0300 113/60 - - 103 20 (!) 94 %   02/22/18 0200 - - - (!) 197 - (!) 87 %   02/22/18 0100 (!) 145/68 - - 106 (!) 25 (!) 91 %   02/22/18 0035 - -  - 94 (!) 21 (!) 91 %   02/22/18 0000 121/65 - - 101 15 (!) 89 %   02/21/18 2315 - 99 °F (37.2 °C) Rectal 99 (!) 22 (!) 89 %   02/21/18 2300 (!) 98/56 - - 91 (!) 6 (!) 90 %   02/21/18 2200 115/61 - - 95 18 (!) 90 %   02/21/18 2100 (!) 92/55 98.1 °F (36.7 °C) Rectal 88 20 (!) 91 %   02/21/18 2000 (!) 84/50 99.2 °F (37.3 °C) - 98 20 (!) 90 %   02/21/18 1921 - - - 103 (!) 22 (!) 91 %   02/21/18 1920 - (!) 102.2 °F (39 °C) Rectal 101 (!) 32 (!) 90 %   02/21/18 1915 (!) 94/52 - - 99 (!) 21 (!) 91 %   02/21/18 1900 (!) 94/52 - - 101 (!) 21 (!) 92 %   02/21/18 1815 127/60 - - (!) 112 (!) 36 (!) 90 %   02/21/18 1800 112/65 - - 107 (!) 24 (!) 92 %   02/21/18 1745 - - - 110 (!) 24 (!) 92 %   02/21/18 1730 - - - (!) 117 (!) 26 (!) 92 %   02/21/18 1724 - (!) 103.9 °F (39.9 °C) Rectal - - -   02/21/18 1716 - - - - (!) 25 (!) 92 %   02/21/18 1715 - - - (!) 119 (!) 35 (!) 92 %   02/21/18 1700 108/61 (!) 103.3 °F (39.6 °C) Oral (!) 112 (!) 30 (!) 93 %   02/21/18 1645 - - - (!) 111 (!) 23 (!) 94 %   02/21/18 1630 - - - 109 (!) 26 (!) 94 %   02/21/18 1615 - - - 106 (!) 24 (!) 93 %   02/21/18 1600 105/62 - - 105 (!) 23 (!) 93 %   02/21/18 1545 (!) 95/52 - - 105 (!) 22 (!) 93 %   02/21/18 1530 - - - 105 (!) 23 (!) 93 %       Physical Exam:  Gen - Well developed, intubated, on vent  HEENT - Anicteric, + ET tube  CV - S1, S2, no murmurs/rubs  Lungs - CTA-B, normal excursion  Abd - Soft, NT, ND, normal BS's, no HSM.  Ext - No c/c/e  Neuro - No asterixis    Labs:    Recent Labs  Lab 02/22/18  0515   WBC 4.05   RBC 4.39*   HGB 11.7*   HCT 34.7*   PLT 57*   MCV 79*   MCH 26.7*   MCHC 33.7       Recent Labs  Lab 02/22/18  0515   CALCIUM 8.3*   PROT 6.5      K 3.9   CO2 27      BUN 41*   CREATININE 1.7*   ALKPHOS 68   ALT 30   *   BILITOT 1.0       Recent Labs  Lab 02/22/18  0515   INR 1.0       Imaging:  Reviewed Abd. X-ray    Assessment:  This patient is a 58 y.o. male with:   1. Rectal Bleeding - Unclear etiology, most  consistent with LGI source.  Now appears to be improved.    2. Pancytopenia - Concern for Bone Marrow etiology/failure.  3. Chronic Hep C  4. Resp. Failure - secondary to pneumonia    Recommendations:  1. Monitor for sx's of bleeding.  2. Continue with supportive care.  3. Defer endoscopic evaluation, considering other/ongoing acute medical issues.  4. Heme/Onc evaluation.

## 2018-02-22 NOTE — PLAN OF CARE
Problem: Patient Care Overview  Goal: Plan of Care Review  Outcome: Ongoing (interventions implemented as appropriate)  Plan of care reviewed. No falls, skin assessed. Remains in ICU on vent. Propofol and levo infusing for sedation. Cooling blanket off, temp 99.5. Adequate urine output in william. Responds to pain. Flexi seal reinserted due to multiple watery bowel movements. ABx given as scheduled. Tube feeds remain on hold. CT of the chest scheduled for this morning. Labs monitored. SCD's remain to bilat legs. VSS Will continue to monitor.

## 2018-02-22 NOTE — ASSESSMENT & PLAN NOTE
Rapid HIV is negative  Hep C + with RNA of 75. Also folate, iron and B12 deficient. Combination of these causing marrow failure?  Pt followed by Hem/Onc and s/p Granix for neutropenia x 1. Neutropenia improved since. ANC 1600  Platelets low and with active bleeding s/p transfusion 1U of platelets on 2/18  S/p transfusion of 4U PRBC and 2U of plts. Now stable and pancytopenia much improved   Iron given with PRBCs. Continue folic acid and B12 supplementation   Continue to monitor

## 2018-02-22 NOTE — ASSESSMENT & PLAN NOTE
Intubated for worsening respiratory distress. Multilobar PNA. Patient also worsened after blood products. TRALI vs TACO? Patient has worsened since yesterday despite negative 1.1L fluid balance. Patchy consolidation on CXR   -Low Vt ventilation.   -Wean Vent according to ARDSnet protocol  -Broad spectrum Abx. ID following. Cr is 1.7 down from 1.8 yesterday. Consider keeping Vancomycin given patient's severe thrombocytopenia requiring platelet transfusion.   -Continue diuresing patient.   -Recommend CT chest without contrast.  -Consider adding Fungal coverage given patchy consolidative airspace disease with no improvement on ABx.    Note: Paralytics added to facilitate CT given dyssynchrony with the ventilator. Will turn off after CT.

## 2018-02-22 NOTE — PLAN OF CARE
Problem: Patient Care Overview  Goal: Plan of Care Review  Outcome: Ongoing (interventions implemented as appropriate)  Remains on Fentanyl and Propofol for sedation. Orally intubated. OG clamped, placement verified. Meropenem started on today w/ am additional dose of Vancomycin. Cooling pad in place for temp greater than 103. Unable to transport to CT scan on today due to instability w/ movement. Patient desats with any movement and stimulation. Dr. Stephenson aware of current status and findings. Flexiseal removed and rectal temp probe in place. ST on monitor. Remains free of injury and falls today.

## 2018-02-22 NOTE — PROGRESS NOTES
[facility] [place]  Adult Nutrition  Consult Note    SUMMARY     Recommendations    Recommendation/Intervention:   1. Current TF adequate to meet needs - resume TF when able   2. RD to monitor    Goals: Initiate nutrition within 48 hrs  Nutrition Goal Status: goal met  Communication of RD Recs: discussed on rounds    Continuum of Care Plan    Referral to Outpatient Services:  (D/C planning: Too soon to determine)    Reason for Assessment    Reason for Assessment: RD follow-up  Diagnosis:  (septic shock; respiratory faillure; aspiration)  Relevent Medical History: HTN   Interdisciplinary Rounds: attended     General Information Comments: Patient remains intubated with propofol. TF initiated-on hold for CT at this time. Was tolerating goal rate.      Nutrition Prescription Ordered    Current Diet Order: NPO     Current Nutrition Support Formula Ordered: Peptamen  Current Nutrition Support Rate Ordered: 50 (ml)  Current Nutrition Support Frequency Ordered: ml/hr        Evaluation of Received Nutrients/Fluid Intake    Enteral Calories (kcal): 1200  Enteral Protein (gm): 110  Enteral (Free Water) Fluid (mL): 1008  Free Water Flush Fluid (mL): 900      Other Calories (kcal): 504  Total Calories (kcal): 1704     Energy Calories Required: meeting needs  % Kcal Needs: 100% (permissive underfeeding)      Protein Required: meeting needs  % Protein Needs: 100%      Total Fluid Intake (mL): 1980      I/O: 927/4529         Fluid Required:  (per MD)  Comments: Watery BM  Tolerance: tolerating    Nutrition Risk Screen     Nutrition Risk Screen: no indicators present    Nutrition/Diet History      Meal/Snack Patterns: CLEMENTE   Factors Affecting Nutritional Intake: NPO, on mechanical ventilation     Labs/Tests/Procedures/Meds     Pertinent Labs Reviewed: reviewed  Pertinent Labs Comments: alb 1.9; GFR 46  Pertinent Medications Reviewed: reviewed  Pertinent Medications Comments: propofol, abx    Physical Findings    Overall  "Physical Appearance: obese, on ventilator support  Tubes: orogastric tube  Oral/Mouth Cavity: tooth/teeth missing  Skin: dermatitis (of spine)    Anthropometrics    Temp: (!) 100.8 °F (38.2 °C)     Height: 5' 7" (170.2 cm)  Weight Method: Bed Scale  Weight: 106.8 kg (235 lb 7.2 oz)  Ideal Body Weight (IBW), Male: 148 lb     % Ideal Body Weight, Male (lb): 159.09 lb     BMI (Calculated): 37  BMI Grade: 35 - 39.9 - obesity - grade II    Estimated/Assessed Needs    Weight Used For Calorie Calculations: 106.8 kg (235 lb 7.2 oz)      Energy Calorie Requirements (kcal): 1897 (7541-5885)  Energy Need Method: [facility] (modified)     RMR (Tr[place]dale-St. Jeor Equation): 1836.62      Weight Used For Protein Calculations: 67 kg (147 lb 11.3 oz) (IBW)  Protein Requirements: 105-135g     RDA Method (mL): 1897      Assessment and Plan    Nutrition Dx: Inadequate Energy Intake r/t mechanical ventilation as evidenced by NPO  Nutrition Dx Status: Improved (TF initiated).      Monitor and Evaluation    Food and Nutrient Intake: energy intake, enteral nutrition intake  Food and Nutrient Adminstration: diet order, enteral and parenteral nutrition administration  Knowledge/Beliefs/Attitudes: food and nutrition knowledge/skill  Physical Activity and Function: nutrition-related ADLs and IADLs  Anthropometric Measurements: weight, weight change  Biochemical Data, Medical Tests and Procedures: electrolyte and renal panel  Nutrition-Focused Physical Findings: overall appearance    Nutrition Risk    Level of Risk:  (2 x week)    Nutrition Follow-Up    RD Follow-up?: Yes        "

## 2018-02-22 NOTE — SUBJECTIVE & OBJECTIVE
Interval History: clinically worsened and requiring more supplemental O2 despite diuresis. CT chest with patchy consolidations throughout. Renal function stable.     Review of Systems   Unable to perform ROS: Intubated     Objective:     Vital Signs (Most Recent):  Temp: (!) 100.8 °F (38.2 °C) (02/22/18 0701)  Pulse: 109 (02/22/18 0815)  Resp: (!) 28 (02/22/18 0815)  BP: (!) 114/57 (02/22/18 0800)  SpO2: (!) 88 % (02/22/18 0815) Vital Signs (24h Range):  Temp:  [98.1 °F (36.7 °C)-103.9 °F (39.9 °C)] 100.8 °F (38.2 °C)  Pulse:  [] 109  Resp:  [6-36] 28  SpO2:  [87 %-95 %] 88 %  BP: ()/(50-69) 114/57     Weight: 106.8 kg (235 lb 7.2 oz)  Body mass index is 36.88 kg/m².    Intake/Output Summary (Last 24 hours) at 02/22/18 0846  Last data filed at 02/22/18 0800   Gross per 24 hour   Intake           962.32 ml   Output             4354 ml   Net         -3391.68 ml      Physical Exam   Constitutional: He appears well-developed.   Disheveled, unkept   HENT:   Head: Normocephalic and atraumatic.   ET tube in place   Eyes: Conjunctivae are normal. Right eye exhibits no discharge. Left eye exhibits no discharge.   Neck: Normal range of motion.   Cardiovascular: Normal rate and regular rhythm.  Exam reveals no gallop and no friction rub.    No murmur heard.  Pulmonary/Chest:   Course ventilated breath sounds that are diminished throughout, some rhonchi and + crackles at bases   Abdominal: Soft. Bowel sounds are normal.   Musculoskeletal: Normal range of motion. He exhibits edema.   1+ edema throughtout   Neurological:   Sedated on vent   Skin: Skin is warm and dry. No erythema.   Burn marks on palmar aspect left thumb   Psychiatric: He has a normal mood and affect. His behavior is normal. Judgment and thought content normal.   Nursing note and vitals reviewed.      Significant Labs: All pertinent labs within the past 24 hours have been reviewed.    Significant Imaging: I have reviewed all pertinent imaging  results/findings within the past 24 hours.  I have reviewed and interpreted all pertinent imaging results/findings within the past 24 hours.

## 2018-02-22 NOTE — ASSESSMENT & PLAN NOTE
Intubated for worsening respiratory distress. Multilobar PNA. Most likely aspiration PNA. On FiO2 60% and PEEP 10.   -Low Vt ventilation.   -Wean Vent according to ARDSnet protocol  -Daily awakening trial.   -Broad spectrum Abx  -Continue diuresing patient. He has received a fair amount of blood products and IVF.   Recommend CT chest without contrast given fevers without ventilator improvement. May consider   Bronchoscopy if no improvement.

## 2018-02-22 NOTE — ASSESSMENT & PLAN NOTE
Had low platelets and bleeding from rectal tube after placement  S/p transfusion of 2U platelets   S/p transfusion of 4U PRBC  Stool in flexi seal does not appear bloody nor melanotic finally after platelet transfusion  GI following. Appreciate input

## 2018-02-22 NOTE — ASSESSMENT & PLAN NOTE
Pt with multilobar pneumonia, likely aspiration from the history  Immunocompromised state with neutropenia and malnutrition. Unfortunately no tox screen obtained in the ED but has possible track marks on dorsum aspect of left hand and a burn without open wound on left thumb.    Continued respiratory decompensation with increased work of breathing and increasing pCO2  Intubated and placed on vent on 2/18  All cultures negative and quant gold interm  Pulmonary following and managing vent. ID on board for abx management  Still high O2 requirements at PEEP of 10 and FIO2 70% despite diuresis. Attempted CT of chest to re-eval pleural effusions vs new intrathoracic pathology however he did not tolerate portable vent. Abx just broadened today. No vasopressor support required at this time

## 2018-02-23 PROBLEM — F20.89 OTHER SCHIZOPHRENIA: Status: ACTIVE | Noted: 2018-01-01

## 2018-02-23 PROBLEM — F10.20 ALCOHOL DEPENDENCE, CONTINUOUS: Status: ACTIVE | Noted: 2018-01-01

## 2018-02-23 PROBLEM — E87.6 HYPOKALEMIA: Status: RESOLVED | Noted: 2018-01-01 | Resolved: 2018-01-01

## 2018-02-23 PROBLEM — D70.8 OTHER NEUTROPENIA: Status: ACTIVE | Noted: 2018-01-01

## 2018-02-23 NOTE — PROGRESS NOTES
[facility]  Pulmonology  Progress Note    Patient Name: Dwight Boltno  MRN: [id number]  Admission Date: 2/12/2018  Hospital Length of Stay: 11 days  Code Status: Full Code  Attending Provider: Miriam Horan MD  Primary Care Provider: Primary Doctor No   Principal Problem: Acute respiratory failure with hypoxia and hypercarbia    Subjective:     Interval History: Afebrile this morning. Patient with significant bilateral consolidation on CT scan.     Review of Systems   Unable to perform ROS: Intubated     Objective:     Vital Signs (Most Recent):  Temp: 97.5 °F (36.4 °C) (02/23/18 0730)  Pulse: 72 (02/23/18 0730)  Resp: 20 (02/23/18 0730)  BP: (!) 93/53 (02/23/18 0730)  SpO2: 96 % (02/23/18 0730) Vital Signs (24h Range):  Temp:  [94.7 °F (34.8 °C)-98.8 °F (37.1 °C)] 97.5 °F (36.4 °C)  Pulse:  [] 72  Resp:  [18-31] 20  SpO2:  [83 %-99 %] 96 %  BP: ()/(51-70) 93/53     Weight: 106.8 kg (235 lb 7.2 oz)  Body mass index is 36.88 kg/m².    Intake/Output Summary (Last 24 hours) at 02/23/18 0802  Last data filed at 02/23/18 0700   Gross per 24 hour   Intake          2863.55 ml   Output             1283 ml   Net          1580.55 ml      Physical Exam   Constitutional: He appears well-developed.   HENT:   Head: Normocephalic and atraumatic.   ET tube in place   Eyes: Conjunctivae are normal. Right eye exhibits no discharge. Left eye exhibits no discharge.   Neck: Normal range of motion.   Cardiovascular: Normal rate and regular rhythm.  Exam reveals no gallop and no friction rub.    No murmur heard.  Tachycardic   Pulmonary/Chest:   Course ventilated breath sounds that are diminished throughout, some rhonchi and + crackles at bases   Abdominal: Soft. Bowel sounds are normal.   Musculoskeletal: Normal range of motion. He exhibits edema.   1+ edema to LE   Neurological:   Sedated on vent   Skin: Skin is warm and dry. No erythema.   Psychiatric: He has a normal mood and affect. His behavior is  normal. Judgment and thought content normal.   Nursing note and vitals reviewed.      Significant Labs: All pertinent labs within the past 24 hours have been reviewed.    Significant Imaging: I have reviewed and interpreted all pertinent imaging results/findings within the past 24 hours.    Assessment/Plan:     * Acute respiratory failure with hypoxia and hypercarbia    Intubated for worsening respiratory distress. Multilobar PNA. CT scan with diffuse bilateral consolidation.  Patient has worsened despite negative fluid balance. Patchy consolidation on CXR.  -Low Vt ventilation.   -Wean Vent according to ARDSnet protocol  -Broad spectrum Abx. ID following. Cr stable. Consider keeping Vancomycin given patient's severe thrombocytopenia requiring platelet transfusion.   -Continue diuresing patient as tolerarted.   -Agree with Fungal coverage             Acute renal failure    Recommend diuresis. Likely ATN secondary to sepsis/shock. Cr stable.         GIB (gastrointestinal bleeding)    PPI. GI following. No signs of acute bleeding.           Critical Care time: 35 minutes.     Critical Care time for the evaluation and treatment of severe organ dysfunction, review of pertinent labs and imaging studies and discussions with primary team .    Continue efforts to locate primary team.        Ivan Esquivel MD  Pulmonology  [facility]

## 2018-02-23 NOTE — PROGRESS NOTES
"Gastroenterology    CC: Rectal bleeding    HPI 58 y.o. male with brown stool in rectal tube collection bag. No pressors.  No fever.       Past Medical History:   Diagnosis Date    Hypertension        Physical Examination  BP (!) 98/54   Pulse 70   Temp 97.4 °F (36.3 °C) (Oral)   Resp 20   Ht 5' 7" (1.702 m)   Wt 106.8 kg (235 lb 7.2 oz)   SpO2 95%   BMI 36.88 kg/m²   General appearance: intubated/sedated  HENT: Normocephalic, atraumatic, neck symmetrical, no nasal discharge   Eyes: conjunctivae/corneas clear, no icterus  Lungs: resp non-labored  Heart: regular rate   Abdomen: soft, ND  Extremities: extremities symmetric; no clubbing, cyanosis    Assessment:     This patient is a 58 y.o. male with:   1. Rectal Bleeding - Unclear etiology, most consistent with LGI source.  Now appears to be resolved.  Brown stool in rectal tube collection bag.   2. Pancytopenia - Concern for Bone Marrow etiology/failure.  3. Chronic Hep C  4. Resp. Failure - secondary to pneumonia    Plan:   - No endoscopic evaluation planned at this point.  Call back if bleeding recurs.        "

## 2018-02-23 NOTE — SUBJECTIVE & OBJECTIVE
Interval History: afebrile. Again neutropenic. SaO2 > 92% but still on high O2 requirement.Plts 46 but not bleeding.      Review of Systems   Unable to perform ROS: Intubated     Objective:     Vital Signs (Most Recent):  Temp: 98.3 °F (36.8 °C) (02/23/18 1417)  Pulse: 85 (02/23/18 1417)  Resp: 20 (02/23/18 1417)  BP: (!) 97/53 (02/23/18 1400)  SpO2: 97 % (02/23/18 1417) Vital Signs (24h Range):  Temp:  [94.7 °F (34.8 °C)-98.3 °F (36.8 °C)] 98.3 °F (36.8 °C)  Pulse:  [67-85] 85  Resp:  [20-34] 34  SpO2:  [82 %-99 %] 88 %  BP: ()/(49-70) 97/53     Weight: 106.8 kg (235 lb 7.2 oz)  Body mass index is 36.88 kg/m².    Intake/Output Summary (Last 24 hours) at 02/23/18 1454  Last data filed at 02/23/18 1409   Gross per 24 hour   Intake          2798.52 ml   Output              925 ml   Net          1873.52 ml      Physical Exam   Constitutional: He appears well-developed.   Disheveled, unkept   HENT:   Head: Normocephalic and atraumatic.   ET tube in place   Eyes: Conjunctivae are normal. Right eye exhibits no discharge. Left eye exhibits no discharge.   Neck: Normal range of motion.   Cardiovascular: Normal rate and regular rhythm.  Exam reveals no gallop and no friction rub.    No murmur heard.  Pulmonary/Chest:   decreased breath sounds throughout with course sounds.    Abdominal: Soft. Bowel sounds are normal.   Musculoskeletal: Normal range of motion. He exhibits edema.   1+ edema throughtout   Neurological:   Sedated on vent   Skin: Skin is warm and dry. No erythema.   Burn marks on palmar aspect left thumb   Psychiatric: He has a normal mood and affect. His behavior is normal. Judgment and thought content normal.   Nursing note and vitals reviewed.      Significant Labs: All pertinent labs within the past 24 hours have been reviewed.    Significant Imaging: I have reviewed all pertinent imaging results/findings within the past 24 hours.  I have reviewed and interpreted all pertinent imaging results/findings  within the past 24 hours.

## 2018-02-23 NOTE — PLAN OF CARE
Problem: Patient Care Overview  Goal: Plan of Care Review  Outcome: Ongoing (interventions implemented as appropriate)  Pt remains in ICU this shift on ventilator via endotracheal tube. Pt on Nimbex, propofol and fentanyl for comfort and sedation, titrated per order. Vital signs stable. Tube feedings re-started, currently at 20 mL/hr. Flexi-seal in place, small amount of stool overnight. Urine output adequate via william catheter. Pt updated on plan of care. Pt free of hospital acquired injuries and falls.

## 2018-02-23 NOTE — ASSESSMENT & PLAN NOTE
Reported by patient's mother. Is unkept and lived in poor living conditions. Unknown medical regimen patient was on

## 2018-02-23 NOTE — ASSESSMENT & PLAN NOTE
Patient's mother reports he is a heavy alcohol consumer and barely eats  Rapid HIV is negative. Hep C + with RNA of 75. Also folate, iron and B12 deficient. Combination of these can cause marrow failure  Hem/Onc  On board for co-management. Is s/p Granix for neutropenia x 1. Neutropenia improved but only temporarily. ANC 1400 today.   Iron given with PRBCs when GI bleed was present. Also provided with platelets. No active bleeding at this time. Continue folic acid and B12 supplementation

## 2018-02-23 NOTE — ASSESSMENT & PLAN NOTE
Pt met criteria for sepsis given fever, tachycardia, leukopenia, and pneumonia  Continue antibiotics as per ID  Weaned off vasopressor support  Blood cultures from 2/12 negative final,  from 2/14 NGTD and from 2/18 NGTD

## 2018-02-23 NOTE — PROGRESS NOTES
"[facility] Hospital Medicine  Progress Note    Patient Name: Dwight Bolton  MRN: [id number]  Patient Class: IP- Inpatient   Admission Date: 2/12/2018  Length of Stay: 10 days  Attending Physician: Miriam Horan MD  Primary Care Provider: Primary Doctor No        Subjective:     Principal Problem:Acute respiratory failure with hypoxia and hypercarbia    HPI:  Mr. Dwight Bolton is a 57 y.o. male with essential hypertension who presents to [facility] ED with complaints of fall today.  He has been feeling weak "for a while" and is always short of breath but says that he came here because he had a fall today.  He denies any loss of consciousness, lightheadedness, nor did he sustain any head trauma.  He denies any antecedent chest pain or palpitations, but he does say that he is always short of breath and it's not any worse than usual.  He denies any fever, chills, nausea, vomiting, nor headaches.  He decided to come here when he couldn't stand back up.  He is otherwise a very poor historian.    Hospital Course:  Mr. Bolton is a homeless man who presented with weakness and falling, and admitted with LLL pneumonia treated with Rocephin/Azithromycin. He was septic with persistent fever and pancytopenia. Workup remarkable for Hep C+ and he is HIV negative. He never complained of hemoptysis, but given persistent fever, TB workup was done with AFB smears negative thus far and PPD negative. Quantiferon gold pending. Pancytopenia workup in progress by Hem/Onc with low retic count suspicious for marrow failure and he was given Granix with improvement in his neutropenia. ID consulted with antibiotic regimen changed to   Cefepime on 2/17 and continued on Vancomycin started on 2/14, and Azithromycin restarted on 2/17. Pt was on respiratory isolation. Transferred to the ICU on 2/17 for continued respiratory decompensation on BIPAP. On 2/18, he failed BIPAP and had to be intubated and placed on vent. Rectal tube returned " blood and he was transfused 1U platelets and 2U PRBC as well and GI was consulted. Central line was placed and he required pressor support with levophed.    Interval History: clinically worsened and requiring more supplemental O2 despite diuresis. CT chest with patchy consolidations throughout. Renal function stable.     Review of Systems   Unable to perform ROS: Intubated     Objective:     Vital Signs (Most Recent):  Temp: (!) 100.8 °F (38.2 °C) (02/22/18 0701)  Pulse: 109 (02/22/18 0815)  Resp: (!) 28 (02/22/18 0815)  BP: (!) 114/57 (02/22/18 0800)  SpO2: (!) 88 % (02/22/18 0815) Vital Signs (24h Range):  Temp:  [98.1 °F (36.7 °C)-103.9 °F (39.9 °C)] 100.8 °F (38.2 °C)  Pulse:  [] 109  Resp:  [6-36] 28  SpO2:  [87 %-95 %] 88 %  BP: ()/(50-69) 114/57     Weight: 106.8 kg (235 lb 7.2 oz)  Body mass index is 36.88 kg/m².    Intake/Output Summary (Last 24 hours) at 02/22/18 0846  Last data filed at 02/22/18 0800   Gross per 24 hour   Intake           962.32 ml   Output             4354 ml   Net         -3391.68 ml      Physical Exam   Constitutional: He appears well-developed.   Disheveled, unkept   HENT:   Head: Normocephalic and atraumatic.   ET tube in place   Eyes: Conjunctivae are normal. Right eye exhibits no discharge. Left eye exhibits no discharge.   Neck: Normal range of motion.   Cardiovascular: Normal rate and regular rhythm.  Exam reveals no gallop and no friction rub.    No murmur heard.  Pulmonary/Chest:   Course ventilated breath sounds that are diminished throughout, some rhonchi and + crackles at bases   Abdominal: Soft. Bowel sounds are normal.   Musculoskeletal: Normal range of motion. He exhibits edema.   1+ edema throughtout   Neurological:   Sedated on vent   Skin: Skin is warm and dry. No erythema.   Burn marks on palmar aspect left thumb   Psychiatric: He has a normal mood and affect. His behavior is normal. Judgment and thought content normal.   Nursing note and vitals  reviewed.      Significant Labs: All pertinent labs within the past 24 hours have been reviewed.    Significant Imaging: I have reviewed all pertinent imaging results/findings within the past 24 hours.  I have reviewed and interpreted all pertinent imaging results/findings within the past 24 hours.    Assessment/Plan:      * Acute respiratory failure with hypoxia and hypercarbia    Pt with multilobar pneumonia, likely aspiration from the history. However he has worsened despite diuresis, ventilator support with plenty of PEE and broad spectrum abx. Given immunocompromised  (neutropenic) state on admission, fungal infection is considered. Cultures have not revealed an organism. Considered bronch, however patient is not stable enough for this and still thrombocytopenia to 50s which is actually better. Doubt patchy infiltrations (seen on CT) and fever are due to TRALI (had multiple blood transfusions). Will add antifungal therapy in case this is a fungal infection. Respiratory to obtain a deep resp Cx. ID and pulm on board for co-management.         Malnutrition of moderate degree    Will start tube feeds today if continues to be stable from GI bleed standpoint. PPI changed to BID        Acute renal failure    Likely due to ATN with hypoperfusion in setting of septic shock. Shock resolved and is not on vasopressor support  Stop IVF as patient is hypervolemic. Will start tube feeds. BP good without vasopressor support. Strict I/Os  Will monitor        Septic shock    Pt met criteria for sepsis given fever, tachycardia, leukopenia, and pneumonia  Continue antibiotics as per ID  Weaned off vasopressor support  Blood cultures from 2/12 negative final,  from 2/14 NGTD and from 2/18 NGTD          GIB (gastrointestinal bleeding)    Had low platelets and bleeding from rectal tube after placement  S/p transfusion of 2U platelets   S/p transfusion of 4U PRBC  Stool in flexi seal does not appear bloody nor melanotic finally after  "platelet transfusion  GI following. Appreciate input          Chronic hepatitis C virus infection    Reportedly patient was aware of "cirrhosis" diagnosis but he does not reportedly drink ETOH  No previous report of Hep C until this admission with workup with hepatitis panel  Viral RNA of 75  LFTs with mild elevation  Synthetic function of liver normal, and CT scan with no gross abnormality seen in the liver  Will follow LFTs        Pneumonia of left lower lobe due to infectious organism    As discussed above        Essential hypertension    All anti-HTN medications for now.         Hypokalemia    Corrected to normal  Monitor        Pancytopenia    Rapid HIV is negative  Hep C + with RNA of 75. Also folate, iron and B12 deficient. Combination of these causing marrow failure?  Pt followed by Hem/Onc and s/p Granix for neutropenia x 1. Neutropenia improved since. ANC 1600  Platelets low and with active bleeding s/p transfusion 1U of platelets on 2/18  S/p transfusion of 4U PRBC and 2U of plts. Now stable and pancytopenia much improved   Iron given with PRBCs. Continue folic acid and B12 supplementation   Continue to monitor        Aspiration pneumonia    Initially treated for CAP  Likely to be aspiration pneumonia complicated by immunocompromised state. Fungal infx?  Antibiotics broadened recently by ID. Antifungals added          VTE Risk Mitigation         Ordered     Medium Risk of VTE  Once      02/12/18 2212        Called contact number in chart multiple times but no answer. I have no other numbers of relatives or friends to call. All consents done by Physicians for emergent procedures or transfusions. Prognosis guarded. Critically ill    Critical care time spent on the evaluation and treatment of severe organ dysfunction, review of pertinent labs and imaging studies, discussions with consulting providers and discussions with patient/family: > 35 minutes.    Miriam Stephenson MD  Department of Hospital Medicine "   [facility]

## 2018-02-23 NOTE — ASSESSMENT & PLAN NOTE
Will start tube feeds today if continues to be stable from GI bleed standpoint. PPI changed to BID

## 2018-02-23 NOTE — ASSESSMENT & PLAN NOTE
Had low platelets and bleeding from rectal tube after placement  S/p transfusion of 2D platelets   S/p transfusion of 4U PRBC  Stool in flexi seal does not appear bloody nor melanotic finally after platelet transfusion  RESOLVED

## 2018-02-23 NOTE — ASSESSMENT & PLAN NOTE
Initially treated for CAP. Patient is a heavy alcohol drinker  Likely to be aspiration pneumonia complicated by immunocompromised state. Fungal infx?  Antibiotics broadened recently by ID. Antifungals added. New resp Cx sent to lab

## 2018-02-23 NOTE — PROGRESS NOTES
[facility]  Hematology/Oncology  Progress Note    Patient Name: Dwight Bolton  Admission Date: 2018  Hospital Length of Stay: 11 days  Code Status: Full Code     Subjective:     Interval History:      2018: pt placed on paralytic agt. Fever resolved. Had CT chest  2018: continue to have elevated temp. Hypoxia on vent.   2018: Continue to have fever.   2018: remained intubated in icu. +  fever   2019: developed respiratory failure and septic shock leading to icu admission. Off Granix   2018: states feeling better. Fever improving        Oncology Treatment Plan:/   [No treatment plan]    Medications:  Continuous Infusions:   cisatracurium (NIMBEX) infusion 2 mcg/kg/min (18 06)    fentanyl 20 mL/hr at 18 0600    propofol 49.938 mcg/kg/min (18 06)     Scheduled Meds:   albuterol-ipratropium 2.5mg-0.5mg/3mL  3 mL Nebulization Q6H    chlorhexidine  15 mL Mouth/Throat BID    cyanocobalamin  1,000 mcg Per OG tube Daily    fluconazole (DIFLUCAN) IVPB  400 mg Intravenous Q24H    folic acid  1 mg Per OG tube Daily    meropenem (MERREM) IVPB  1 g Intravenous Q8H    methylPREDNISolone sodium succinate  125 mg Intravenous Q6H    pantoprazole  40 mg Intravenous BID    pneumoc 13-serafin conj-dip cr(PF)  0.5 mL Intramuscular Once    tbo-filgrastim  300 mcg Subcutaneous Daily    vancomycin (VANCOCIN) IVPB  1,500 mg Intravenous Q12H     PRN Meds:sodium chloride, sodium chloride, sodium chloride, sodium chloride, acetaminophen, albuterol-ipratropium 2.5mg-0.5mg/3mL, cloNIDine, [] fentaNYL **FOLLOWED BY** fentaNYL, gelatin adsorbable 100cm top sponge, influenza, ondansetron, promethazine (PHENERGAN) IVPB, ramelteon, senna-docusate 8.6-50 mg     Review of Systems     Unable to obtain- pt in icu on a vent     Objective:     Vital Signs (Most Recent):  Temp: 97.4 °F (36.3 °C) (18 0530)  Pulse: 71 (18 0615)  Resp: 20 (18  0615)  BP: (!) 98/54 (02/23/18 0600)  SpO2: 95 % (02/23/18 0615) Vital Signs (24h Range):  Temp:  [94.7 °F (34.8 °C)-98.8 °F (37.1 °C)] 97.4 °F (36.3 °C)  Pulse:  [] 71  Resp:  [18-31] 20  SpO2:  [83 %-96 %] 95 %  BP: ()/(51-70) 98/54     Weight: 106.8 kg (235 lb 7.2 oz)  Body mass index is 36.88 kg/m².  Body surface area is 2.25 meters squared.      Intake/Output Summary (Last 24 hours) at 02/23/18 0702  Last data filed at 02/23/18 0600   Gross per 24 hour   Intake          2797.46 ml   Output             1258 ml   Net          1539.46 ml       Physical Exam    General: In ICU on a vent.   Head: normocephalic, atraumatic   Eyes: conjunctivae pale    Throat: ET tube   Neck: supple, no LAD   Lungs:  Decreased BS R compared to left   Heart: S1, S2, RR   Abdomen: + BS x 4 quadrants, soft, non tender. Rectal tube with brown stool   Extremities: no cyanosis or edema.   Skin: turgor normal, no erythema or rashes.  Neuro: unable to assess     Significant Labs:   CBC:     Recent Labs  Lab 02/22/18 0515 02/23/18  0155   WBC 4.05 1.80*   HGB 11.7* 10.1*   HCT 34.7* 31.0*   PLT 57* 46*   , CMP:     Recent Labs  Lab 02/22/18 0515 02/23/18  0155    144   K 3.9 4.0    104   CO2 27 28   GLU 83 167*   BUN 41* 49*   CREATININE 1.7* 1.8*   CALCIUM 8.3* 8.1*   PROT 6.5 6.2   ALBUMIN 1.9* 1.7*   BILITOT 1.0 0.8   ALKPHOS 68 57   * 115*   ALT 30 24   ANIONGAP 12 12   EGFRNONAA 43* 40*   , Coagulation:     Recent Labs  Lab 02/22/18 0515 02/23/18  0155   INR 1.0 1.0   , Haptoglobin: No results for input(s): HAPTOGLOBIN in the last 48 hours., Immunology: No results for input(s): SPEP, MINAL, LUCIEN, FREELAMBDALI in the last 48 hours., LDH: No results for input(s): LDHCSF, BFSOURCE in the last 48 hours., Reticulocytes: No results for input(s): RETIC in the last 48 hours., Tumor Markers: No results for input(s): PSA, CEA, , AFPTM, [id number],  in the last 48 hours.    Invalid input(s): ALGTM, Uric Acid No  results for input(s): URICACID in the last 48 hours. and Urine Studies:   No results for input(s): COLORU, APPEARANCEUA, PHUR, SPECGRAV, PROTEINUA, GLUCUA, KETONESU, BILIRUBINUA, OCCULTUA, NITRITE, UROBILINOGEN, LEUKOCYTESUR, RBCUA, WBCUA, BACTERIA, SQUAMEPITHEL, HYALINECASTS in the last 48 hours.    Invalid input(s): WRIGHTSUR    Diagnostic Results:    Current Facility-Administered Medications   Medication Dose Route Frequency Provider Last Rate Last Dose    0.9%  NaCl infusion (for blood administration)   Intravenous Q24H PRN Mable Johnson MD   Stopped at 02/18/18 1615    0.9%  NaCl infusion (for blood administration)   Intravenous Q24H PRN Christian Hassan MD   Stopped at 02/18/18 1614    0.9%  NaCl infusion (for blood administration)   Intravenous Q24H PRN Miriam Horan MD        0.9%  NaCl infusion (for blood administration)   Intravenous Q24H PRN Miriam Horan MD        acetaminophen oral solution 650 mg  650 mg Per NG tube Q6H PRN Tamia Michaels MD   650 mg at 02/21/18 1848    albuterol-ipratropium 2.5mg-0.5mg/3mL nebulizer solution 3 mL  3 mL Nebulization Q6H Kirk Vicente MD   3 mL at 02/23/18 0718    albuterol-ipratropium 2.5mg-0.5mg/3mL nebulizer solution 3 mL  3 mL Nebulization Q4H PRN Theodora Monson MD   3 mL at 02/17/18 2020    chlorhexidine 0.12 % solution 15 mL  15 mL Mouth/Throat BID Mable Johnson MD   15 mL at 02/23/18 0800    cisatracurium (NIMBEX) 100 mg in dextrose 5 % 100 mL infusion  2 mcg/kg/min (Dosing Weight) Intravenous Continuous Ivan Esquivel MD   Stopped at 02/23/18 0803    cloNIDine tablet 0.1 mg  0.1 mg Oral TID PRN Theodora Monson MD        cyanocobalamin tablet 1,000 mcg  1,000 mcg Per OG tube Daily Miriam Horan MD   1,000 mcg at 02/23/18 0807    fentaNYL (SUBLIMAZE) 2,500 mcg in dextrose 5 % 250 mL infusion   Intravenous Continuous Ivan Esquivel MD 20 mL/hr at 02/23/18 1000      fentaNYL injection 50 mcg  50 mcg Intravenous Q1H PRN Mable  CARMEN Johnson MD   50 mcg at 02/18/18 0933    fluconazole (DIFLUCAN) IVPB 400 mg 200 mL  400 mg Intravenous Q24H Miriam Horan MD        folic acid tablet 1 mg  1 mg Per OG tube Daily Miriam Horan MD   1 mg at 02/23/18 0802    gelatin adsorbable 100cm top sponge sponge 1 applicator  1 applicator Topical (Top) BID PRN Ivan Esquivel MD   1 applicator at 02/18/18 1754    influenza (FLUZONE,FLUARIX QUADRIVALENT) vaccine 0.5 mL  0.5 mL Intramuscular vaccine x 1 dose Shandra Shepherd MD        meropenem injection 1 g  1 g Intravenous Q8H Sinan Gomez DO   1 g at 02/23/18 0631    methylPREDNISolone sodium succinate injection 125 mg  125 mg Intravenous Q6H Anthony Ramachandran MD   125 mg at 02/23/18 0539    ondansetron injection 8 mg  8 mg Intravenous Q8H PRN Theodora Monson MD   8 mg at 02/15/18 1547    pantoprazole injection 40 mg  40 mg Intravenous BID Miriam Horan MD   40 mg at 02/23/18 0800    pneumoc 13-serafin conj-dip cr(PF) 0.5 mL  0.5 mL Intramuscular Once Shandra Shepherd MD        promethazine (PHENERGAN) 12.5 mg in dextrose 5 % 50 mL IVPB  12.5 mg Intravenous Q6H PRN Theodora Monson MD        propofol (DIPRIVAN) 10 mg/mL infusion  5 mcg/kg/min Intravenous Continuous Mable Johnson MD 28.8 mL/hr at 02/23/18 1005 45 mcg/kg/min at 02/23/18 1005    ramelteon tablet 8 mg  8 mg Oral Nightly PRN Theodora Monson MD        senna-docusate 8.6-50 mg per tablet 1 tablet  1 tablet Oral BID PRN Theodora Monson MD        tbo-filgrastim (GRANIX) injection 300 mcg/0.5 mL  300 mcg Subcutaneous Daily Anthony Ramachandran MD   300 mcg at 02/23/18 1001    vancomycin (VANCOCIN) 1,500 mg in sodium chloride 0.9% 250 mL IVPB  1,500 mg Intravenous Q12H Miriam Horan MD   Stopped at 02/23/18 1028         Assessment/Plan:     Active Diagnoses:    Diagnosis Date Noted POA    PRINCIPAL PROBLEM:  Acute respiratory failure with hypoxia and hypercarbia [J96.01, J96.02] 02/18/2018 Yes    Acute renal failure  [N17.9] 02/19/2018 Yes    Malnutrition of moderate degree [E44.0] 02/19/2018 Yes    GIB (gastrointestinal bleeding) [K92.2] 02/18/2018 Yes    Septic shock [A41.9, R65.21] 02/18/2018 Yes    Chronic hepatitis C virus infection [B18.2] 02/15/2018 Yes    Pneumonia of left lower lobe due to infectious organism [J18.1] 02/14/2018 Yes    Aspiration pneumonia [J69.0] 02/12/2018 Yes    Pancytopenia [D61.818] 02/12/2018 Yes    Hypokalemia [E87.6] 02/12/2018 Yes    Essential hypertension [I10] 02/12/2018 Yes     Chronic      Problems Resolved During this Admission:    Diagnosis Date Noted Date Resolved POA       Mr. Bolton, 58 YO man with possible CAP found to have pancytopenia. Low albumin, elevated AST      1. Pancytopenia: multifactorial including infection, malnutrition.                   - Granix 300 mcg daily until ANC > 1500: Discontinued               - elevated  LDH              - Hep C +   - pt has inappropriately low retic- pt has underlying bone marrow pathology   - may need BM bx- can be done out pt once acute infectious issues resolves   - also Hep C causing cytopenia- especially low platelets   - s/p  6 units prbc    - fever resolved   - wbc low: resume Granix        2. CAP: now with sepsis and resp failure   - pt has multifocal pneumonia    - CT reviewed      3. Acute hypoxic respiratory failure - on vent    - pulmonary following    - worsening pulmonary status: CXR appear to have TRALI    - started steroid     4. Fever: in the context of pancytopenia - broadened coverage   - ID following pt    - ABX upgraded    - need CT of ab/pelvis    - started antifungal coverage      Anthony Ramachandran M.D  Internal Medicine & Geriatric Medicine  Hematology & Oncology  Palliative Medicine    [address], [address]  [place] [id number] [phone] (Office)  [phone] (Fax)

## 2018-02-23 NOTE — PLAN OF CARE
Problem: Patient Care Overview  Goal: Plan of Care Review  Outcome: Ongoing (interventions implemented as appropriate)  Pt has remained with SR-ST at times. Pt remains on vent PRVC with FIO2 art 80% rate 20 Peep14 and tidal volume 430. Pt remains on diprivan at 50mcg and also on fentanyl at 200mcg.Pt also on nimbex at 2. Pt has no skin breakdown noted. Pt has been repositioned q 2hrs. Pt with T-max 100.8. Pt has cooling blanket that is not in use at this time.

## 2018-02-23 NOTE — ASSESSMENT & PLAN NOTE
"Reportedly patient was aware of "cirrhosis" diagnosis. Has Hep C and is heavy alcohol consumer. Hep C RNA 75  Is not in liver failure.   "

## 2018-02-23 NOTE — ASSESSMENT & PLAN NOTE
Pt with multilobar pneumonia, 2/2 chronic aspiration vs atypical bacterial vs fungal. Worsened despite diuresis, ventilator support and broad spectrum abx. Given immunocompromised  (neutropenic) state on admission, fungal infection is considered. Cultures have not revealed an organism. Considered bronch, however patient is not stable enough for this and still thrombocytopenia. Doubt patchy infiltrations (seen on CT) and fever are due to TRALI (had multiple blood transfusions). Hem added steroids in case it was TRALI. I will discuss this with Hem as can worsen a fungal infection if indeed present. Continue antifungal therapy. Hold vanc as level was very high today. Respiratory obtained a deep resp Cx. Will f/u results. ID and pulm on board for co-management.

## 2018-02-23 NOTE — PROGRESS NOTES
"[facility] Hospital Medicine  Progress Note    Patient Name: Dwight Bolton  MRN: [id number]  Patient Class: IP- Inpatient   Admission Date: 2/12/2018  Length of Stay: 11 days  Attending Physician: Miriam Horan MD  Primary Care Provider: Primary Doctor No        Subjective:     Principal Problem:Acute respiratory failure with hypoxia and hypercarbia    HPI:  Mr. Dwight Bolton is a 57 y.o. male with essential hypertension who presents to [facility] ED with complaints of fall today.  He has been feeling weak "for a while" and is always short of breath but says that he came here because he had a fall today.  He denies any loss of consciousness, lightheadedness, nor did he sustain any head trauma.  He denies any antecedent chest pain or palpitations, but he does say that he is always short of breath and it's not any worse than usual.  He denies any fever, chills, nausea, vomiting, nor headaches.  He decided to come here when he couldn't stand back up.  He is otherwise a very poor historian.    Hospital Course:  Mr. Bolton presented with severe sepsis likely secondary to pneumonia. CXR showed LLL consolidation. Given no recent hospitalization for previous 3 months, he was initiated on ceftriaxone and azithromycin, plus supplemental O2. Workup significant for Hep C and pancytopenia with neutropenia. Retic count low, therefore Hem/Onc consulted for co-management. ID also consulted for abx co-management given immunocompromised state. Patient continued to decline despite antimicrobial regimen and supplemental O2. Placed on BiPAP. Transferred to the ICU on 2/17 for continued respiratory decompensation on BIPAP. On 2/18, required emergent intubation. A rectal tube was placed and blood seen post placement. Transfused a total of 4U of PRBCs and 2 doses of platelets for acute blood loss anemia in setting of thrombocytopenia and GI bleed. GI consulted. Recommended conservative management. GI bleed resolved with " platelet transfusion. Neutropenia temporarily improved after filgrastim injection. Overall patient continued to decline, gradually requiring more O2 from vent. Diuresed without improvement. Resp Cx and BCx from 2/17 negative. A CT of chest showed multiple areas of consolidation in the right upper lobes as well as the lower lobes bilateral concerning for multifocal pneumonia. Abx broadened to meropenem and vancomycin. Fluconazole also added in case fungal infection.     Interval History: afebrile. Again neutropenic. SaO2 > 92% but still on high O2 requirement.Plts 46 but not bleeding.      Review of Systems   Unable to perform ROS: Intubated     Objective:     Vital Signs (Most Recent):  Temp: 98.3 °F (36.8 °C) (02/23/18 1417)  Pulse: 85 (02/23/18 1417)  Resp: 20 (02/23/18 1417)  BP: (!) 97/53 (02/23/18 1400)  SpO2: 97 % (02/23/18 1417) Vital Signs (24h Range):  Temp:  [94.7 °F (34.8 °C)-98.3 °F (36.8 °C)] 98.3 °F (36.8 °C)  Pulse:  [67-85] 85  Resp:  [20-34] 34  SpO2:  [82 %-99 %] 88 %  BP: ()/(49-70) 97/53     Weight: 106.8 kg (235 lb 7.2 oz)  Body mass index is 36.88 kg/m².    Intake/Output Summary (Last 24 hours) at 02/23/18 1454  Last data filed at 02/23/18 1409   Gross per 24 hour   Intake          2798.52 ml   Output              925 ml   Net          1873.52 ml      Physical Exam   Constitutional: He appears well-developed.   Disheveled, unkept   HENT:   Head: Normocephalic and atraumatic.   ET tube in place   Eyes: Conjunctivae are normal. Right eye exhibits no discharge. Left eye exhibits no discharge.   Neck: Normal range of motion.   Cardiovascular: Normal rate and regular rhythm.  Exam reveals no gallop and no friction rub.    No murmur heard.  Pulmonary/Chest:   decreased breath sounds throughout with course sounds.    Abdominal: Soft. Bowel sounds are normal.   Musculoskeletal: Normal range of motion. He exhibits edema.   1+ edema throughtout   Neurological:   Sedated on vent   Skin: Skin is warm  and dry. No erythema.   Burn marks on palmar aspect left thumb   Psychiatric: He has a normal mood and affect. His behavior is normal. Judgment and thought content normal.   Nursing note and vitals reviewed.      Significant Labs: All pertinent labs within the past 24 hours have been reviewed.    Significant Imaging: I have reviewed all pertinent imaging results/findings within the past 24 hours.  I have reviewed and interpreted all pertinent imaging results/findings within the past 24 hours.    Assessment/Plan:      * Acute respiratory failure with hypoxia and hypercarbia    Pt with multilobar pneumonia, 2/2 chronic aspiration vs atypical bacterial vs fungal. Worsened despite diuresis, ventilator support and broad spectrum abx. Given immunocompromised  (neutropenic) state on admission, fungal infection is considered. Cultures have not revealed an organism. Considered bronch, however patient is not stable enough for this and still thrombocytopenia. Doubt patchy infiltrations (seen on CT) and fever are due to TRALI (had multiple blood transfusions). Hem added steroids in case it was TRALI. I will discuss this with Hem as can worsen a fungal infection if indeed present. Continue antifungal therapy. Hold vanc as level was very high today. Respiratory obtained a deep resp Cx. Will f/u results. ID and pulm on board for co-management.         Aspiration pneumonia    Initially treated for CAP. Patient is a heavy alcohol drinker  Likely to be aspiration pneumonia complicated by immunocompromised state. Fungal infx?  Antibiotics broadened recently by ID. Antifungals added. New resp Cx sent to lab        Acquired pancytopenia    Patient's mother reports he is a heavy alcohol consumer and barely eats  Rapid HIV is negative. Hep C + with RNA of 75. Also folate, iron and B12 deficient. Combination of these can cause marrow failure  Hem/Onc  On board for co-management. Is s/p Granix for neutropenia x 1. Neutropenia improved but  "only temporarily. ANC 1400 today.   Iron given with PRBCs when GI bleed was present. Also provided with platelets. No active bleeding at this time. Continue folic acid and B12 supplementation           Pneumonia of left lower lobe due to infectious organism    As discussed above        Other schizophrenia    Reported by patient's mother. Is unkept and lived in poor living conditions. Unknown medical regimen patient was on          Other neutropenia    2/2 marrow failure          Alcohol dependence, continuous    As reported by patient's mother        Malnutrition of moderate degree    Tube feeds.         Acute renal failure    Likely due to ATN with hypoperfusion in setting of septic shock. Shock resolved and is not on vasopressor support  Continue tube feeds and temporarily add IVFs.   Strict I/Os  Will monitor        Septic shock    Not currently on vasopressor support. Will be used as needed for goal SBP > 90 mmHg          GIB (gastrointestinal bleeding)    Had low platelets and bleeding from rectal tube after placement  S/p transfusion of 2D platelets   S/p transfusion of 4U PRBC  Stool in flexi seal does not appear bloody nor melanotic finally after platelet transfusion  RESOLVED          Chronic hepatitis C virus infection    Reportedly patient was aware of "cirrhosis" diagnosis. Has Hep C and is heavy alcohol consumer. Hep C RNA 75  Is not in liver failure.         Essential hypertension    All anti-HTN medications for now.           VTE Risk Mitigation         Ordered     Medium Risk of VTE  Once      02/12/18 2212        Plan of care discussed with patient's mother    Critical care time spent on the evaluation and treatment of severe organ dysfunction, review of pertinent labs and imaging studies, discussions with consulting providers and discussions with patient/family: > 35 minutes.    Miriam Stephenson MD  Department of Hospital Medicine   [facility]  "

## 2018-02-23 NOTE — ASSESSMENT & PLAN NOTE
Likely due to ATN with hypoperfusion in setting of septic shock. Shock resolved and is not on vasopressor support  Continue tube feeds and temporarily add IVFs.   Strict I/Os  Will monitor

## 2018-02-23 NOTE — EICU
Vent Day # 6     59 y/o M Hep C, septic shock secondary to multilobar pneumonia.  Intubated 2/18 after failed Bipap. Pancytopenia  Also with GIB.  Still high oxygen requirement sedated on propofol and fentanyl, on neuromuscular blockade.    CXR persistent patchy infiltrates with consolidation despite diureses     2/22/2018 05:14 2/23/2018 04:28   POC PH 7.432 7.353   POC PCO2 47.4 (H) 54.5 (H)   POC PO2 62 (L) 73 (L)   POC BE 7 4   POC HCO3 31.6 (H) 30.3 (H)   POC SATURATED O2 92 (L) 93 (L)   POC TCO2 33 (H) 32 (H)   FiO2 70 80   Vt 450 430   PiP  33   PEEP 10 14       · Continue ARDS net vent protocol

## 2018-02-23 NOTE — SUBJECTIVE & OBJECTIVE
Interval History: Afebrile this morning. Patient with significant bilateral consolidation on CT scan.     Review of Systems   Unable to perform ROS: Intubated     Objective:     Vital Signs (Most Recent):  Temp: 97.5 °F (36.4 °C) (02/23/18 0730)  Pulse: 72 (02/23/18 0730)  Resp: 20 (02/23/18 0730)  BP: (!) 93/53 (02/23/18 0730)  SpO2: 96 % (02/23/18 0730) Vital Signs (24h Range):  Temp:  [94.7 °F (34.8 °C)-98.8 °F (37.1 °C)] 97.5 °F (36.4 °C)  Pulse:  [] 72  Resp:  [18-31] 20  SpO2:  [83 %-99 %] 96 %  BP: ()/(51-70) 93/53     Weight: 106.8 kg (235 lb 7.2 oz)  Body mass index is 36.88 kg/m².    Intake/Output Summary (Last 24 hours) at 02/23/18 0802  Last data filed at 02/23/18 0700   Gross per 24 hour   Intake          2863.55 ml   Output             1283 ml   Net          1580.55 ml      Physical Exam   Constitutional: He appears well-developed.   HENT:   Head: Normocephalic and atraumatic.   ET tube in place   Eyes: Conjunctivae are normal. Right eye exhibits no discharge. Left eye exhibits no discharge.   Neck: Normal range of motion.   Cardiovascular: Normal rate and regular rhythm.  Exam reveals no gallop and no friction rub.    No murmur heard.  Tachycardic   Pulmonary/Chest:   Course ventilated breath sounds that are diminished throughout, some rhonchi and + crackles at bases   Abdominal: Soft. Bowel sounds are normal.   Musculoskeletal: Normal range of motion. He exhibits edema.   1+ edema to LE   Neurological:   Sedated on vent   Skin: Skin is warm and dry. No erythema.   Psychiatric: He has a normal mood and affect. His behavior is normal. Judgment and thought content normal.   Nursing note and vitals reviewed.      Significant Labs: All pertinent labs within the past 24 hours have been reviewed.    Significant Imaging: I have reviewed and interpreted all pertinent imaging results/findings within the past 24 hours.

## 2018-02-23 NOTE — PROGRESS NOTES
Results for GEENA BENJAMIN (MRN [id number]) as of 2/23/2018 05:16   Ref. Range 2/23/2018 04:28   POC PH Latest Ref Range: 7.35 - 7.45  7.353   POC PCO2 Latest Ref Range: 35 - 45 mmHg 54.5 (H)   POC PO2 Latest Ref Range: 80 - 100 mmHg 73 (L)   POC BE Latest Ref Range: -2 to 2 mmol/L 4   POC HCO3 Latest Ref Range: 24 - 28 mmol/L 30.3 (H)   POC SATURATED O2 Latest Ref Range: 95 - 100 % 93 (L)   POC TCO2 Latest Ref Range: 23 - 27 mmol/L 32 (H)   FiO2 Unknown 80   Vt Unknown 430   PiP Unknown 33   PEEP Unknown 14   Sample Unknown ARTERIAL   DelSys Unknown Adult Vent   Allens Test Unknown Pass   Site Unknown RR   Mode Unknown AC/PRVC

## 2018-02-23 NOTE — ASSESSMENT & PLAN NOTE
Intubated for worsening respiratory distress. Multilobar PNA. CT scan with diffuse bilateral consolidation.  Patient has worsened despite negative fluid balance. Patchy consolidation on CXR.  -Low Vt ventilation.   -Wean Vent according to ARDSnet protocol  -Broad spectrum Abx. ID following. Cr stable. Consider keeping Vancomycin given patient's severe thrombocytopenia requiring platelet transfusion.   -Continue diuresing patient as tolerarted.   -Agree with Fungal coverage

## 2018-02-23 NOTE — ASSESSMENT & PLAN NOTE
Pt with multilobar pneumonia, likely aspiration from the history. However he has worsened despite diuresis, ventilator support with plenty of PEE and broad spectrum abx. Given immunocompromised  (neutropenic) state on admission, fungal infection is considered. Cultures have not revealed an organism. Considered bronch, however patient is not stable enough for this and still thrombocytopenia to 50s which is actually better. Doubt patchy infiltrations (seen on CT) and fever are due to TRALI (had multiple blood transfusions). Will add antifungal therapy in case this is a fungal infection. Respiratory to obtain a deep resp Cx. ID and pulm on board for co-management.

## 2018-02-23 NOTE — ASSESSMENT & PLAN NOTE
Likely due to ATN with hypoperfusion in setting of septic shock. Shock resolved and is not on vasopressor support  Stop IVF as patient is hypervolemic. Will start tube feeds. BP good without vasopressor support. Strict I/Os  Will monitor

## 2018-02-23 NOTE — PROGRESS NOTES
"Family contacts: Dr Esquivel requested SW assist with locating family as no answer at phone numbers in chart. SW reviewed chart, attempted calls at all numbers listed for patient and his mother. SW contacted ACT team, learned that they have been looking for patient, that they went to his home and to his mother's address yesterday to attempt to locate patient. Spoke with his doctor and provided phone to Dr Esquivel to speak with his doctor. Will call team back to update as they will go by mother's home to notify since phone number not working now.  2:30 pm thru day--this am mother called as result of help from ACT ([facility])  Mother Eliane Bolton [phone]--came to visit, met with Dr Horan--may be able to contact patient's daughter who mother reports is estranged from patient. Also informs that patient has a son who has not been in contact with patient or his grandmother "for years".  Contact people thru day  Viviana Crowe at [phone]  Dr Nicol Vigil thru [facility] same  Aris Adame thru [facility] [phone] x [phone], visited and met with mother and Dr Horan  At home, patient gets meds delivered thru [facility] Mon and Wed--of note, they filed missing person report yesterday.  Patient uses [facility] and goes to Dr Hernandez at [facility] per Viviana.   At this time patient remains in ICU on vent support.   SW/CM will follow, assist as needed.   "

## 2018-02-23 NOTE — ASSESSMENT & PLAN NOTE
Initially treated for CAP  Likely to be aspiration pneumonia complicated by immunocompromised state. Fungal infx?  Antibiotics broadened recently by ID. Antifungals added

## 2018-02-24 NOTE — PROGRESS NOTES
.  Progress Note  Infectious Disease    Admit Date: 2/12/2018   LOS: 12 days     SUBJECTIVE:     Follow-up For:   Fever of unclear etiology    Antibiotics     Start     Stop Route Frequency Ordered    02/21/18 1530  meropenem injection 1 g      -- IV Every 8 hours (non-standard times) 02/21/18 1512              OBJECTIVE:     Vital Signs (Most Recent)  Temp: 98.5 °F (36.9 °C) (02/24/18 0724)  Pulse: 81 (02/24/18 1000)  Resp: (!) 0 (02/24/18 1000)  BP: (!) 109/58 (02/24/18 1000)  SpO2: (!) 90 % (02/24/18 1000)    Temperature Range Min/Max (Last 24H):  Temp:  [97.2 °F (36.2 °C)-98.5 °F (36.9 °C)]     I & O (Last 24H):  Intake/Output Summary (Last 24 hours) at 02/24/18 1034  Last data filed at 02/24/18 1000   Gross per 24 hour   Intake          4858.09 ml   Output              396 ml   Net          4462.09 ml       Lines/Drains:       Percutaneous Central Line Insertion/Assessment - triple lumen  02/22/18 0914 right internal jugular (Active)   Dressing biopatch in place;dressing dry and intact 2/24/2018  7:24 AM   Securement secured w/ sutures 2/24/2018  7:24 AM   Distal Patency/Care infusing 2/24/2018  7:24 AM   Medial Patency/Care infusing 2/24/2018  7:24 AM   Proximal Patency/Care infusing 2/24/2018  7:24 AM   Dressing Change Due 03/01/18 2/24/2018  7:24 AM   Daily Line Review Performed 2/24/2018  7:24 AM            Peripheral IV - Single Lumen 02/20/18 1105 Right Hand (Active)   Site Assessment Clean;Dry;Intact;No redness;No swelling 2/24/2018  7:24 AM   Line Status Saline locked 2/24/2018  7:24 AM   Dressing Status Clean;Dry;Intact 2/24/2018  7:24 AM   Dressing Change Due 02/24/18 2/24/2018  7:24 AM   Site Change Due 02/24/18 2/24/2018  3:30 AM   Reason Not Rotated Not due 2/24/2018  7:24 AM            NG/OG Tube 02/18/18 0930 16 Fr. Center mouth (Active)   Placement Check placement verified by aspirate characteristics;placement verified by x-ray 2/24/2018  7:24 AM   Tube advanced (cm) 65 2/19/2018  4:00 PM    Tolerance no signs/symptoms of discomfort 2/24/2018  7:24 AM   Securement taped to commercial device 2/24/2018  7:24 AM   Clamp Status/Tolerance unclamped 2/24/2018  7:24 AM   Suction Setting/Drainage Method suction at;low;intermittent setting 2/19/2018 12:00 PM   Insertion Site Appearance no redness, warmth, tenderness, skin breakdown, drainage 2/24/2018  7:24 AM   Drainage Bile 2/19/2018 12:00 PM   Flush/Irrigation flushed w/;water;no resistance met 2/24/2018  3:30 AM   Feeding Method continuous 2/24/2018  7:24 AM   Current Rate (mL/hr) 50 mL/hr 2/24/2018  3:30 AM   Goal Rate (mL/hr) 50 mL/hr 2/24/2018  3:30 AM   Intake (mL) 100 mL 2/24/2018  9:00 AM   Water Bolus (mL) 150 mL 2/24/2018  8:00 AM   Tube Output(mL)(Include Discarded Residual) 50 mL 2/19/2018  4:00 PM   Rate Formula Tube Feeding (mL/hr) 50 mL/hr 2/24/2018  3:30 AM   Intake (mL) - Formula Tube Feeding 50 2/24/2018 10:00 AM   Residual Amount (ml) 0 ml 2/24/2018  3:30 AM   Length Of Feeding (Min) 60 2/24/2018  3:30 AM       Laboratory:  Recent Results (from the past 24 hour(s))   CBC auto differential    Collection Time: 02/24/18  2:25 AM   Result Value Ref Range    WBC 11.53 3.90 - 12.70 K/uL    RBC 3.52 (L) 4.60 - 6.20 M/uL    Hemoglobin 9.4 (L) 14.0 - 18.0 g/dL    Hematocrit 28.5 (L) 40.0 - 54.0 %    MCV 81 (L) 82 - 98 fL    MCH 26.7 (L) 27.0 - 31.0 pg    MCHC 33.0 32.0 - 36.0 g/dL    RDW 19.4 (H) 11.5 - 14.5 %    Platelets 57 (L) 150 - 350 K/uL    MPV SEE COMMENT 9.2 - 12.9 fL    Lymph # CANCELED 1.0 - 4.8 K/uL    Mono # CANCELED 0.3 - 1.0 K/uL    Eos # CANCELED 0.0 - 0.5 K/uL    Baso # CANCELED 0.00 - 0.20 K/uL    Gran% 93.0 (H) 38.0 - 73.0 %    Lymph% 2.0 (L) 18.0 - 48.0 %    Mono% 2.0 (L) 4.0 - 15.0 %    Eosinophil% 0.0 0.0 - 8.0 %    Basophil% 0.0 0.0 - 1.9 %    Bands 3.0 %    Differential Method Manual    Comprehensive metabolic panel    Collection Time: 02/24/18  2:25 AM   Result Value Ref Range    Sodium 143 136 - 145 mmol/L    Potassium 4.2  3.5 - 5.1 mmol/L    Chloride 104 95 - 110 mmol/L    CO2 27 23 - 29 mmol/L    Glucose 136 (H) 70 - 110 mg/dL    BUN, Bld 78 (H) 6 - 20 mg/dL    Creatinine 3.2 (H) 0.5 - 1.4 mg/dL    Calcium 7.8 (L) 8.7 - 10.5 mg/dL    Total Protein 6.2 6.0 - 8.4 g/dL    Albumin 1.7 (L) 3.5 - 5.2 g/dL    Total Bilirubin 0.5 0.1 - 1.0 mg/dL    Alkaline Phosphatase 62 55 - 135 U/L    AST 81 (H) 10 - 40 U/L    ALT 19 10 - 44 U/L    Anion Gap 12 8 - 16 mmol/L    eGFR if African American 23 (A) >60 mL/min/1.73 m^2    eGFR if non African American 20 (A) >60 mL/min/1.73 m^2   Protime-INR    Collection Time: 02/24/18  3:20 AM   Result Value Ref Range    Prothrombin Time 9.8 9.0 - 12.5 sec    INR 0.9 0.8 - 1.2   ISTAT PROCEDURE    Collection Time: 02/24/18  4:41 AM   Result Value Ref Range    POC PH 7.285 (LL) 7.35 - 7.45    POC PCO2 58.5 (HH) 35 - 45 mmHg    POC PO2 68 (L) 80 - 100 mmHg    POC HCO3 27.8 24 - 28 mmol/L    POC BE 1 -2 to 2 mmol/L    POC SATURATED O2 90 (L) 95 - 100 %    POC TCO2 30 (H) 23 - 27 mmol/L    Rate 20     Sample ARTERIAL     Site RR     Allens Test Pass     DelSys Adult Vent     Mode AC/PRVC     Vt 430     PEEP 4     PiP 30     FiO2 70     Min Vol 7.6     Sp02 92    Vancomycin, random    Collection Time: 02/24/18  8:40 AM   Result Value Ref Range    Vancomycin, Random 50.1 Not established ug/mL       Micro:  Microbiology Results (last 7 days)     Procedure Component Value Units Date/Time    Culture, VAP (BAL) [[id number]] Collected:  02/22/18 1023    Order Status:  Completed Specimen:  Sputum from BAL, RLL Updated:  02/24/18 0903     VAP BAL CULTURE Normal respiratory rosie    Narrative:       Respiratory perform    Blood culture [[id number]] Collected:  02/22/18 1643    Order Status:  Completed Specimen:  Blood from Peripheral, Left  Hand Updated:  02/23/18 2103     Blood Culture, Routine No Growth to date     Blood Culture, Routine No Growth to date    Blood culture [[id number]] Collected:  02/22/18 1635    Order  Status:  Completed Specimen:  Blood from Line, Central Left  Neck Updated:  02/23/18 2103     Blood Culture, Routine No Growth to date     Blood Culture, Routine No Growth to date    Blood culture [[id number]] Collected:  02/18/18 1215    Order Status:  Completed Specimen:  Blood Updated:  02/23/18 1503     Blood Culture, Routine No growth after 5 days.    Blood culture [[id number]] Collected:  02/18/18 1210    Order Status:  Completed Specimen:  Blood Updated:  02/23/18 1503     Blood Culture, Routine No growth after 5 days.    Urine culture [[id number]] Collected:  02/20/18 1345    Order Status:  Completed Specimen:  Urine from Urine, Catheterized Updated:  02/22/18 0836     Urine Culture, Routine No growth    Culture, Respiratory with Gram Stain [[id number]] Collected:  02/18/18 0925    Order Status:  Completed Specimen:  Respiratory from Endotracheal Aspirate Updated:  02/20/18 0842     Respiratory Culture Normal respiratory rosie     Gram Stain (Respiratory) <10 epithelial cells per low power field.     Gram Stain (Respiratory) Moderate WBC's     Gram Stain (Respiratory) No organisms seen    Blood culture [[id number]] Collected:  02/14/18 2140    Order Status:  Completed Specimen:  Blood Updated:  02/19/18 2303     Blood Culture, Routine No growth after 5 days.    Blood culture [[id number]] Collected:  02/14/18 2120    Order Status:  Completed Specimen:  Blood Updated:  02/19/18 2303     Blood Culture, Routine No growth after 5 days.    AFB Culture & Smear [[id number]] Collected:  02/17/18 1135    Order Status:  Completed Specimen:  Respiratory from Sputum, Expectorated Updated:  02/19/18 2127     AFB Culture & Smear Culture in progress     AFB CULTURE STAIN No acid fast bacilli seen.    Blood culture [[id number]] Collected:  02/12/18 1755    Order Status:  Completed Specimen:  Blood from Peripheral, Hand, Left Updated:  02/17/18 2103     Blood Culture, Routine No growth after 5 days.    Blood culture [[id number]]  Collected:  02/12/18 1740    Order Status:  Completed Specimen:  Blood from Peripheral, Antecubital, Left Updated:  02/17/18 2103     Blood Culture, Routine No growth after 5 days.    Clostridium difficile EIA [[id number]] Collected:  02/16/18 1550    Order Status:  Completed Specimen:  Stool from Stool Updated:  02/17/18 1433     C. diff Antigen Negative     C difficile Toxins A+B, EIA Negative     Comment: Testing not recommended for children <24 months old.       AFB Culture & Smear [[id number]]     Order Status:  No result Specimen:  Respiratory from Sputum, Expectorated               ASSESSMENT/PLAN:     Active Hospital Problems    Diagnosis  POA    *Acute respiratory failure with hypoxia and hypercarbia [J96.01, J96.02]  Yes    Alcohol dependence, continuous [F10.20]  Yes    Other neutropenia [D70.8]  Yes    Other schizophrenia [F20.89]  Yes    Acute renal failure [N17.9]  Yes    Malnutrition of moderate degree [E44.0]  Yes    GIB (gastrointestinal bleeding) [K92.2]  Yes    Septic shock [A41.9, R65.21]  Yes    Chronic hepatitis C virus infection [B18.2]  Yes    Pneumonia of left lower lobe due to infectious organism [J18.1]  Yes    Aspiration pneumonia [J69.0]  Yes    Acquired pancytopenia [D61.818]  Yes    Essential hypertension [I10]  Yes     Chronic      Resolved Hospital Problems    Diagnosis Date Resolved POA    Hypokalemia [E87.6] 02/23/2018 Yes           Physical Exam:  Gen: NAD  Pul: CTA   Cardio:  +S1+S2  Abd: Soft, NT  Ext: +le edema  Skin: No active lesions     IMPRESSION;   1) Fever of unclear etiology.  2) HepC antibody (RNA confirmation pending).  3) Multilobar PNA.  4) Pancytopenia.   5) YOLI.     RECOMMENDATIONS;   1) I started the pt on viktor yesterday- will cont with this.   2) YOLI is worsening... With levels of vanc that are suprathereuptic.  3) The bone marrow suppressive effects of linezolid take two weeks to manifest and are reversible.  4) Daily vanc levels until they fall  under 20 and then start linezolid.  5) Pt started on fluconazole.   6) CT scan of abdomen was reviewed.    7) Blood cx's remain negative.   8) Jorje tmanagement as per [facility].  9) We will cont to follow.

## 2018-02-24 NOTE — SUBJECTIVE & OBJECTIVE
Interval History: Afebrile this morning. No real improvement in ventilator requirements.      Review of Systems   Unable to perform ROS: Intubated     Objective:     Vital Signs (Most Recent):  Temp: 98.5 °F (36.9 °C) (02/24/18 0724)  Pulse: 79 (02/24/18 1030)  Resp: (!) 0 (02/24/18 1030)  BP: (!) 111/58 (02/24/18 1030)  SpO2: (!) 90 % (02/24/18 1030) Vital Signs (24h Range):  Temp:  [97.2 °F (36.2 °C)-98.5 °F (36.9 °C)] 98.5 °F (36.9 °C)  Pulse:  [75-90] 79  Resp:  [0-34] 0  SpO2:  [82 %-97 %] 90 %  BP: ()/(49-59) 111/58     Weight: 106.8 kg (235 lb 7.2 oz)  Body mass index is 36.88 kg/m².    Intake/Output Summary (Last 24 hours) at 02/24/18 1053  Last data filed at 02/24/18 1000   Gross per 24 hour   Intake          4858.09 ml   Output              396 ml   Net          4462.09 ml      Physical Exam   Constitutional: He appears well-developed.   HENT:   Head: Normocephalic and atraumatic.   ET tube in place   Eyes: Conjunctivae are normal. Right eye exhibits no discharge. Left eye exhibits no discharge.   Neck: Normal range of motion.   Cardiovascular: Normal rate and regular rhythm.  Exam reveals no gallop and no friction rub.    No murmur heard.  Tachycardic   Pulmonary/Chest:   Course ventilated breath sounds that are diminished throughout, some rhonchi and + crackles at bases   Abdominal: Soft. Bowel sounds are normal.   Musculoskeletal: Normal range of motion. He exhibits edema.   1+ edema to LE   Neurological:   Sedated on vent   Skin: Skin is warm and dry. No erythema.   Psychiatric: He has a normal mood and affect. His behavior is normal. Judgment and thought content normal.   Nursing note and vitals reviewed.      Significant Labs: All pertinent labs within the past 24 hours have been reviewed.    Significant Imaging: I have reviewed and interpreted all pertinent imaging results/findings within the past 24 hours.

## 2018-02-24 NOTE — PROGRESS NOTES
Pt was received on vent settings are PRVC/24/430/+14/60% sats 90-92%. Sx mod amount of secretions. Dr Esquivel increased rate to 24 and decreased FiO2 60%

## 2018-02-24 NOTE — ASSESSMENT & PLAN NOTE
Likely due to ATN with hypoperfusion in setting of septic shock, now worsened by supratherapeutic vanc level. Shock resolved and is not on vasopressor support.  Continue tube feeds and temporarily add IVFs.   Consult nephrology. Might not be a candidate for long term HD  Strict I/Os  Will monitor

## 2018-02-24 NOTE — ASSESSMENT & PLAN NOTE
Intubated for worsening respiratory distress. Multilobar PNA. CT scan with diffuse bilateral consolidation.  Patient has worsened despite negative fluid balance.   -Low Vt ventilation.   -Wean Vent according to [facility] protocol  -Broad spectrum Abx. ID following.  -Patient is still significantly volume up. Receiving IVF.   -This is unlikely to be TRALI. Stop steroids.

## 2018-02-24 NOTE — ASSESSMENT & PLAN NOTE
Worsening Cr. Vancomycin levels are supra therapeutic. Levels need to be monitored closely. Avoid concurrent nephrotoxic agents. UOP has decreased.

## 2018-02-24 NOTE — ASSESSMENT & PLAN NOTE
Patient's mother reports he is a heavy alcohol consumer and barely eats  Rapid HIV is negative. Hep C + with RNA of 75. Also folate, iron and B12 deficient. Combination of these can cause marrow failure  Hem/Onc  On board for co-management. Is s/p Granix for neutropenia x 1. Neutropenia improved. ANC > 1500 today.   Iron given with PRBCs when GI bleed was present. Also provided with platelets. No active bleeding at this time. Continue folic acid and B12 supplementation

## 2018-02-24 NOTE — SUBJECTIVE & OBJECTIVE
Interval History: afebrile. No longer neutropenic. Vanc level very elevated to 50 today and in renal failure. Not much UOP. Still with high O2 requirements.     Review of Systems   Unable to perform ROS: Intubated     Objective:     Vital Signs (Most Recent):  Temp: 98.3 °F (36.8 °C) (02/23/18 1417)  Pulse: 85 (02/23/18 1417)  Resp: 20 (02/23/18 1417)  BP: (!) 97/53 (02/23/18 1400)  SpO2: 97 % (02/23/18 1417) Vital Signs (24h Range):  Temp:  [97.2 °F (36.2 °C)-98.5 °F (36.9 °C)] 98.5 °F (36.9 °C)  Pulse:  [75-90] 79  Resp:  [0-34] 0  SpO2:  [82 %-97 %] 90 %  BP: ()/(49-59) 111/58     Weight: 106.8 kg (235 lb 7.2 oz)  Body mass index is 36.88 kg/m².    Intake/Output Summary (Last 24 hours) at 02/24/18 1111  Last data filed at 02/24/18 1000   Gross per 24 hour   Intake          4769.02 ml   Output              396 ml   Net          4373.02 ml      Physical Exam   Constitutional: He appears well-developed.   Disheveled, unkept   HENT:   Head: Normocephalic and atraumatic.   ET tube in place   Eyes: Conjunctivae are normal. Right eye exhibits no discharge. Left eye exhibits no discharge.   Neck: Normal range of motion.   Cardiovascular: Normal rate and regular rhythm.  Exam reveals no gallop and no friction rub.    No murmur heard.  Pulmonary/Chest:   decreased breath sounds throughout with course sounds.    Abdominal: Soft. Bowel sounds are normal.   Musculoskeletal: Normal range of motion. He exhibits edema.   1+ edema throughtout   Neurological:   Sedated on vent   Skin: Skin is warm and dry. No erythema.   Burn marks on palmar aspect left thumb   Psychiatric: He has a normal mood and affect. His behavior is normal. Judgment and thought content normal.   Nursing note and vitals reviewed.      Significant Labs: All pertinent labs within the past 24 hours have been reviewed.    Significant Imaging: I have reviewed all pertinent imaging results/findings within the past 24 hours.  I have reviewed and interpreted  all pertinent imaging results/findings within the past 24 hours.

## 2018-02-24 NOTE — CARE UPDATE
Met with patient's mother, daughter and two sons to discuss goals of care. Discussed patient critical condition despite optimal treatment while on vent support for a week now. All family members understood that mortality is high and prognosis is guarded. Address code status and will discuss amongst them but may agree with recommended DNR at this point. Also aware that withdrawal of life saving interventions may be recommended by next week if no signs of improvement. All questions answered to satisfaction. I advised to provide nurse with their names and phone numbers so we can reach them for updates and in case condition continues to deteriorate. Will consider palliative care consult on Monday to aid with goals of care.

## 2018-02-24 NOTE — ASSESSMENT & PLAN NOTE
Pt with multilobar pneumonia, 2/2 chronic aspiration vs atypical bacterial vs fungal. Worsened despite diuresis, ventilator support and broad spectrum abx. Given immunocompromised  (neutropenic) state on admission, fungal infection is considered. Cultures have not revealed an organism. Considered bronch, however patient is not stable enough for this and still thrombocytopenia. Doubt patchy infiltrations (seen on CT) and fever are due to TRALI (had multiple blood transfusions). Hem added steroids in case it was TRALI, but now discontinued as this is unlikely. Continue antifungal therapy. Hold vanc as level was very high today and is now in renal failure. Vanc was continued instead of using linezolid due to concerns for thrombocytopenia. ID and pulm on board for co-management.

## 2018-02-24 NOTE — EICU
Remains on high level vent support at target tidal volumes.  No changes except weaning FIO2 and PEEP as tolerated by [facility] protocol today.    Ponce Schuster MD

## 2018-02-24 NOTE — PLAN OF CARE
Problem: Patient Care Overview  Goal: Plan of Care Review  Outcome: Ongoing (interventions implemented as appropriate)  Pt remains in ICU this shift on ventilator via endotracheal tube. Pt on propofol and fentanyl for comfort and sedation. Vital signs stable. Tube feedings at goal rate of 50, no residuals. Flexi-seal in place, small amount of stool overnight. Urine output minimal via william catheter, MD aware. Pt updated on plan of care. Pt free of hospital acquired injuries and falls.

## 2018-02-24 NOTE — PROGRESS NOTES
"[facility] Hospital Medicine  Progress Note    Patient Name: Dwight Bolton  MRN: [id number]  Patient Class: IP- Inpatient   Admission Date: 2/12/2018  Length of Stay: 12 days  Attending Physician: Miriam Horan MD  Primary Care Provider: Primary Doctor No        Subjective:     Principal Problem:Acute respiratory failure with hypoxia and hypercarbia    HPI:  Mr. Dwight Bolton is a 57 y.o. male with essential hypertension who presents to [facility] ED with complaints of fall today.  He has been feeling weak "for a while" and is always short of breath but says that he came here because he had a fall today.  He denies any loss of consciousness, lightheadedness, nor did he sustain any head trauma.  He denies any antecedent chest pain or palpitations, but he does say that he is always short of breath and it's not any worse than usual.  He denies any fever, chills, nausea, vomiting, nor headaches.  He decided to come here when he couldn't stand back up.  He is otherwise a very poor historian.    Hospital Course:  Mr. Bolton presented with severe sepsis likely secondary to pneumonia. CXR showed LLL consolidation. Given no recent hospitalization for previous 3 months, he was initiated on ceftriaxone and azithromycin, plus supplemental O2. Workup significant for Hep C and pancytopenia with neutropenia. Retic count low, therefore Hem/Onc consulted for co-management. ID also consulted for abx co-management given immunocompromised state. Patient continued to decline despite antimicrobial regimen and supplemental O2. Placed on BiPAP. Transferred to the ICU on 2/17 for continued respiratory decompensation on BIPAP. On 2/18, required emergent intubation. A rectal tube was placed and blood seen post placement. Transfused a total of 4U of PRBCs and 2 doses of platelets for acute blood loss anemia in setting of thrombocytopenia and GI bleed. GI consulted. Recommended conservative management. GI bleed resolved with " platelet transfusion. Neutropenia temporarily improved after filgrastim injection. Overall patient continued to decline, gradually requiring more O2 from vent. Diuresed without improvement. Resp Cx and BCx from 2/17 negative. A CT of chest showed multiple areas of consolidation in the right upper lobes as well as the lower lobes bilateral concerning for multifocal pneumonia. Abx broadened to meropenem and vancomycin. Fluconazole also added in case fungal infection.     Interval History: afebrile. No longer neutropenic. Vanc level very elevated to 50 today and in renal failure. Not much UOP. Still with high O2 requirements.     Review of Systems   Unable to perform ROS: Intubated     Objective:     Vital Signs (Most Recent):  Temp: 98.3 °F (36.8 °C) (02/23/18 1417)  Pulse: 85 (02/23/18 1417)  Resp: 20 (02/23/18 1417)  BP: (!) 97/53 (02/23/18 1400)  SpO2: 97 % (02/23/18 1417) Vital Signs (24h Range):  Temp:  [97.2 °F (36.2 °C)-98.5 °F (36.9 °C)] 98.5 °F (36.9 °C)  Pulse:  [75-90] 79  Resp:  [0-34] 0  SpO2:  [82 %-97 %] 90 %  BP: ()/(49-59) 111/58     Weight: 106.8 kg (235 lb 7.2 oz)  Body mass index is 36.88 kg/m².    Intake/Output Summary (Last 24 hours) at 02/24/18 1111  Last data filed at 02/24/18 1000   Gross per 24 hour   Intake          4769.02 ml   Output              396 ml   Net          4373.02 ml      Physical Exam   Constitutional: He appears well-developed.   Disheveled, unkept   HENT:   Head: Normocephalic and atraumatic.   ET tube in place   Eyes: Conjunctivae are normal. Right eye exhibits no discharge. Left eye exhibits no discharge.   Neck: Normal range of motion.   Cardiovascular: Normal rate and regular rhythm.  Exam reveals no gallop and no friction rub.    No murmur heard.  Pulmonary/Chest:   decreased breath sounds throughout with course sounds.    Abdominal: Soft. Bowel sounds are normal.   Musculoskeletal: Normal range of motion. He exhibits edema.   1+ edema throughtout   Neurological:    Sedated on vent   Skin: Skin is warm and dry. No erythema.   Burn marks on palmar aspect left thumb   Psychiatric: He has a normal mood and affect. His behavior is normal. Judgment and thought content normal.   Nursing note and vitals reviewed.      Significant Labs: All pertinent labs within the past 24 hours have been reviewed.    Significant Imaging: I have reviewed all pertinent imaging results/findings within the past 24 hours.  I have reviewed and interpreted all pertinent imaging results/findings within the past 24 hours.    Assessment/Plan:      * Acute respiratory failure with hypoxia and hypercarbia    Pt with multilobar pneumonia, 2/2 chronic aspiration vs atypical bacterial vs fungal. Worsened despite diuresis, ventilator support and broad spectrum abx. Given immunocompromised  (neutropenic) state on admission, fungal infection is considered. Cultures have not revealed an organism. Considered bronch, however patient is not stable enough for this and still thrombocytopenia. Doubt patchy infiltrations (seen on CT) and fever are due to TRALI (had multiple blood transfusions). Hem added steroids in case it was TRALI, but now discontinued as this is unlikely. Continue antifungal therapy. Hold vanc as level was very high today and is now in renal failure. Vanc was continued instead of using linezolid due to concerns for thrombocytopenia. ID and pulm on board for co-management.         Aspiration pneumonia    Initially treated for CAP. Patient is a heavy alcohol drinker  Likely to be aspiration pneumonia complicated by immunocompromised state. Fungal infx?  Antibiotics broadened recently by ID. Antifungals added. New resp Cx sent to lab showed normal resp rosie.         Acquired pancytopenia    Patient's mother reports he is a heavy alcohol consumer and barely eats  Rapid HIV is negative. Hep C + with RNA of 75. Also folate, iron and B12 deficient. Combination of these can cause marrow failure  Hem/Onc  On  "board for co-management. Is s/p Granix for neutropenia x 1. Neutropenia improved. ANC > 1500 today.   Iron given with PRBCs when GI bleed was present. Also provided with platelets. No active bleeding at this time. Continue folic acid and B12 supplementation           Pneumonia of left lower lobe due to infectious organism    As discussed above        Other schizophrenia    Reported by patient's mother. Is unkept and lived in poor living conditions. Unknown medical regimen patient was on          Other neutropenia    2/2 marrow failure          Alcohol dependence, continuous    As reported by patient's mother        Malnutrition of moderate degree    Tube feeds.         Acute renal failure    Likely due to ATN with hypoperfusion in setting of septic shock, now worsened by supratherapeutic vanc level. Shock resolved and is not on vasopressor support.  Continue tube feeds and temporarily add IVFs.   Consult nephrology. Might not be a candidate for long term HD  Strict I/Os  Will monitor        Septic shock    Not currently on vasopressor support. Will be used as needed for goal SBP > 90 mmHg          GIB (gastrointestinal bleeding)    Had low platelets and bleeding from rectal tube after placement  S/p transfusion of 2D platelets   S/p transfusion of 4U PRBC  Stool in flexi seal does not appear bloody nor melanotic finally after platelet transfusion  RESOLVED          Chronic hepatitis C virus infection    Reportedly patient was aware of "cirrhosis" diagnosis. Has Hep C and is heavy alcohol consumer. Hep C RNA 75  Is not in liver failure.         Essential hypertension    All anti-HTN medications for now.           VTE Risk Mitigation         Ordered     Medium Risk of VTE  Once      02/12/18 2212        Prognosis guarded. Hopefully I get to meet his daughter today to discuss assessment, plan and prognosis. His mother is aware of this. Apparently son may be difficult to get in touch with.    Critical care time spent on " the evaluation and treatment of severe organ dysfunction, review of pertinent labs and imaging studies, discussions with consulting providers and discussions with patient/family: > 45 minutes.    Miriam Stephenson MD  Department of Hospital Medicine   [facility]

## 2018-02-24 NOTE — ASSESSMENT & PLAN NOTE
Initially treated for CAP. Patient is a heavy alcohol drinker  Likely to be aspiration pneumonia complicated by immunocompromised state. Fungal infx?  Antibiotics broadened recently by ID. Antifungals added. New resp Cx sent to lab showed normal resp rosie.

## 2018-02-24 NOTE — CONSULTS
Renal ICU Consult    Date of Admission: 2/12/2018  2:10 PM    Length of Stay:  12  Days      HPI:  57 y.o. AA male with a PMHx. Significant for: hypertension who presented  to [facility] ED on 2/12 with complaints of weakness and having a fall.  Pte. Was diagnosed with a LLL Pneumonia and started on antibiotics (Rocephin and Zithromax) his labs. On admission revealed a normal BUN and creatinine.  Apparently due to persistent fever and Pancytopenia he was started on IV Vancomycin on 2/14 and  changed to Cefepime on 2/17.  Pte. Was transferred to ICU on 2/17 due to respiratory distress and was intubated, his serum creatinine started to rise and is now > 3 also Pte. Had high levels of Vancomycin in the last 2 days.  Consulted for oliguric RF.    Past Medical History:   Diagnosis Date    Hypertension      History reviewed. No pertinent surgical history.  Review of patient's allergies indicates:  No Known Allergies  No current facility-administered medications on file prior to encounter.      No current outpatient prescriptions on file prior to encounter.     Social History     Social History    Marital status: Single     Spouse name: N/A    Number of children: N/A    Years of education: N/A     Occupational History    Not on file.     Social History Main Topics    Smoking status: Current Every Day Smoker     Packs/day: 1.00    Smokeless tobacco: Never Used    Alcohol use No    Drug use: No    Sexual activity: Not on file     Other Topics Concern    Not on file     Social History Narrative    No narrative on file     History reviewed. No pertinent family history.      ROS:  N/a pte. On the vent.    Physical Exam:    Vitals:    02/24/18 1300 02/24/18 1313 02/24/18 1330 02/24/18 1400   BP: (!) 113/57  (!) 113/56 (!) 113/59   Pulse: 83 79 86 86   Resp: (!) 24 (!) 24 (!) 22 (!) 0   Temp:       TempSrc:       SpO2: (!) 92% (!) 91% (!) 93% (!) 92%   Weight:       Height:            I/O last 3 completed shifts:  In: 6163.3 [I.V.:3388.3; NG/GT:2325; IV Piggyback:450]  Out: 1139 [Urine:789; Stool:350]  I/O this shift:  In: 1332.5 [I.V.:882.5; NG/GT:450]  Out: 5 [Urine:5]      well-developed.   Disheveled, unkept   HENT:   Head: Normocephalic and atraumatic.   ET tube in place   Neck: supple  Cardiovascular: Normal rate and regular rhythm.    Pulmonary: decreased breath sounds and scattered ronchi  Abdominal: Soft. Bowel sounds are normal.   Musculoskeletal: distal 1 + edema.   Neurological:   Sedated on vent   Skin: Skin is warm and dry.     Laboratories:      Recent Labs  Lab 02/24/18 0225   WBC 11.53   RBC 3.52*   HGB 9.4*   HCT 28.5*   PLT 57*   MCV 81*   MCH 26.7*   MCHC 33.0       Recent Labs  Lab 02/24/18 0225 02/24/18  0840   CALCIUM 7.8* 7.6*   PROT 6.2  --     143   K 4.2 4.5   CO2 27 25    104   BUN 78* 90*   CREATININE 3.2* 3.8*   ALKPHOS 62  --    ALT 19  --    AST 81*  --    BILITOT 0.5  --      Phosphorus      1.1      2.9     6.7  Phosphorus     Vancomycin, Random   32.5 21.9 20.1 50.1  Vancomycin, Random     Vancomycin-Trough  16.9            Iron      25             Iron      TIBC      283             TIBC     Saturated Iron      9             Saturated Iron     Transferrin      191             Transferrin     Ferritin      198             Ferritin     Folate       3.8            Folate     Vitamin B-12       238            Vitamin         Microbiology Results (last 7 days)     Procedure Component Value Units Date/Time    Culture, VAP (BAL) [[id number]] Collected:  02/22/18 1023    Order Status:  Completed Specimen:  Sputum from BAL, RLL Updated:  02/24/18 0903     VAP BAL CULTURE Normal respiratory rosei    Narrative:       Respiratory perform    Blood culture [[id number]] Collected:  02/22/18 1643    Order Status:  Completed Specimen:  Blood from Peripheral, Left  Hand Updated:  02/23/18 2103     Blood Culture, Routine No Growth to date     Blood Culture,  Routine No Growth to date    Blood culture [[id number]] Collected:  02/22/18 1635    Order Status:  Completed Specimen:  Blood from Line, Central Left  Neck Updated:  02/23/18 2103     Blood Culture, Routine No Growth to date     Blood Culture, Routine No Growth to date    Blood culture [[id number]] Collected:  02/18/18 1215    Order Status:  Completed Specimen:  Blood Updated:  02/23/18 1503     Blood Culture, Routine No growth after 5 days.    Blood culture [[id number]] Collected:  02/18/18 1210    Order Status:  Completed Specimen:  Blood Updated:  02/23/18 1503     Blood Culture, Routine No growth after 5 days.    Urine culture [[id number]] Collected:  02/20/18 1345    Order Status:  Completed Specimen:  Urine from Urine, Catheterized Updated:  02/22/18 0836     Urine Culture, Routine No growth    Culture, Respiratory with Gram Stain [[id number]] Collected:  02/18/18 0925    Order Status:  Completed Specimen:  Respiratory from Endotracheal Aspirate Updated:  02/20/18 0842     Respiratory Culture Normal respiratory rosie     Gram Stain (Respiratory) <10 epithelial cells per low power field.     Gram Stain (Respiratory) Moderate WBC's     Gram Stain (Respiratory) No organisms seen    Blood culture [[id number]] Collected:  02/14/18 2140    Order Status:  Completed Specimen:  Blood Updated:  02/19/18 2303     Blood Culture, Routine No growth after 5 days.    Blood culture [[id number]] Collected:  02/14/18 2120    Order Status:  Completed Specimen:  Blood Updated:  02/19/18 2303     Blood Culture, Routine No growth after 5 days.    AFB Culture & Smear [[id number]] Collected:  02/17/18 1135    Order Status:  Completed Specimen:  Respiratory from Sputum, Expectorated Updated:  02/19/18 2127     AFB Culture & Smear Culture in progress     AFB CULTURE STAIN No acid fast bacilli seen.    Blood culture [[id number]] Collected:  02/12/18 1755    Order Status:  Completed Specimen:  Blood from Peripheral, Hand, Left Updated:  02/17/18  "2103     Blood Culture, Routine No growth after 5 days.    Blood culture [[id number]] Collected:  02/12/18 1740    Order Status:  Completed Specimen:  Blood from Peripheral, Antecubital, Left Updated:  02/17/18 2103     Blood Culture, Routine No growth after 5 days.          Diagnostic Tests:    X-Ray Chest 1 View [[id number]] Resulted: 02/24/18 1253   Order Status: Completed Updated: 02/24/18 1253   Narrative:     XR CHEST 1 VIEW.  Clinical History provided for exam:"MV ". Her NG tube and right internal jugular central venous catheter remain.  ET tube has been retracted slightly and has its tip at T2. The cardiac silhouette is exaggerated by AP portable technique and probably only mildly enlarged.  Pulmonary vascularity is not increased.  The right lung is similar to 2/23/18 at 2025.  There has been interval progression of the abnormal opacification in the left mid and lower lung since the previous examination.  There is likely left pleural fluid as previously.  The right lateral costophrenic sulcus is not blunted.  There is no  pneumothorax.  Visualized osseous structures are stable.   Impression:      Interval increase in opacification in the left mid and lower lung since 2/23/18 at 2025.        Electronically signed by: NE HASSAN MD  Date: 02/24/18  Time: 12:53          Assessment:    - Pneumonia aspiration v.s. CAP  - Acute resp. Failure  - Septic shock? SIRS  - Oliguric YOLI likely multifactorial  - GIB  - Pancytopenia  - Hep C infection  - Alcohol abuse  - Hx. Schyzophrenia    Plan:    - Off Vancomycin  - Antibiotics per ID  - Check FeNa+  - IVFs  - Monitor CVP  - Martinez to gravity  - f/u BMP  - No indication for "acute" Dialysis at present  - GI and Pulmonary following      "

## 2018-02-24 NOTE — PROGRESS NOTES
Received patient on vent settings PRVC 430/20/+14/FIO2 70% ETT size 8.0 secured at 24cm at the lip Ambu bag and mask at bed side

## 2018-02-25 NOTE — SUBJECTIVE & OBJECTIVE
Interval History: renal function worse post dose of lasix given by nephrology as patient had poor UOP. No hyperkalemia. . Afebrile. Vanc level 46    Review of Systems   Unable to perform ROS: Intubated     Objective:     Vital Signs (Most Recent):  Temp: 98 °F (36.7 °C) (02/25/18 0730)  Pulse: 78 (02/25/18 0930)  Resp: (!) 28 (02/25/18 0930)  BP: 116/62 (02/25/18 0930)  SpO2: 95 % (02/25/18 0930) Vital Signs (24h Range):  Temp:  [97.5 °F (36.4 °C)-98.9 °F (37.2 °C)] 98 °F (36.7 °C)  Pulse:  [43-95] 78  Resp:  [0-30] 28  SpO2:  [88 %-95 %] 95 %  BP: (105-122)/(56-66) 116/62     Weight: 106.8 kg (235 lb 7.2 oz)  Body mass index is 36.88 kg/m².    Intake/Output Summary (Last 24 hours) at 02/25/18 1004  Last data filed at 02/25/18 0916   Gross per 24 hour   Intake          4943.74 ml   Output              465 ml   Net          4478.74 ml      Physical Exam   Constitutional: He appears well-developed.   Disheveled, unkept   HENT:   Head: Normocephalic and atraumatic.   ET tube in place   Eyes: Conjunctivae are normal. Right eye exhibits no discharge. Left eye exhibits no discharge.   Neck: Normal range of motion.   Cardiovascular: Normal rate and regular rhythm.  Exam reveals no gallop and no friction rub.    No murmur heard.  Pulmonary/Chest:   decreased breath sounds throughout with course sounds.    Abdominal: Soft. Bowel sounds are normal.   Musculoskeletal: Normal range of motion. He exhibits edema (+ 2 throughout ).   1+ edema throughtout   Neurological:   Sedated on vent   Skin: Skin is warm and dry. No erythema.   Burn marks on palmar aspect left thumb   Psychiatric: He has a normal mood and affect. His behavior is normal. Judgment and thought content normal.   Nursing note and vitals reviewed.      Significant Labs: All pertinent labs within the past 24 hours have been reviewed.    Significant Imaging: I have reviewed all pertinent imaging results/findings within the past 24 hours.  I have reviewed and  interpreted all pertinent imaging results/findings within the past 24 hours.

## 2018-02-25 NOTE — PROGRESS NOTES
"[facility] Hospital Medicine  Progress Note    Patient Name: Dwight Bolton  MRN: [id number]  Patient Class: IP- Inpatient   Admission Date: 2/12/2018  Length of Stay: 13 days  Attending Physician: Miriam Horan MD  Primary Care Provider: Primary Doctor No        Subjective:     Principal Problem:Acute respiratory failure with hypoxia and hypercarbia    HPI:  Mr. Dwight Bolton is a 57 y.o. male with essential hypertension who presents to [facility] ED with complaints of fall today.  He has been feeling weak "for a while" and is always short of breath but says that he came here because he had a fall today.  He denies any loss of consciousness, lightheadedness, nor did he sustain any head trauma.  He denies any antecedent chest pain or palpitations, but he does say that he is always short of breath and it's not any worse than usual.  He denies any fever, chills, nausea, vomiting, nor headaches.  He decided to come here when he couldn't stand back up.  He is otherwise a very poor historian.    Hospital Course:  Mr. Bolton presented with severe sepsis likely secondary to pneumonia. CXR showed LLL consolidation. Given no recent hospitalization for previous 3 months, he was initiated on ceftriaxone and azithromycin, plus supplemental O2. Workup significant for Hep C and pancytopenia with neutropenia. Retic count low, therefore Hem/Onc consulted for co-management. ID also consulted for abx co-management given immunocompromised state. Patient continued to decline despite antimicrobial regimen and supplemental O2. Placed on BiPAP. Transferred to the ICU on 2/17 for continued respiratory decompensation on BIPAP. On 2/18, required emergent intubation. A rectal tube was placed and blood seen post placement. Transfused a total of 4U of PRBCs and 2 doses of platelets for acute blood loss anemia in setting of thrombocytopenia and GI bleed. GI consulted. Recommended conservative management. GI bleed resolved with " platelet transfusion. Neutropenia temporarily improved after filgrastim injection. Overall patient continued to decline, gradually requiring more O2 from vent. Diuresed without improvement. Resp Cx and BCx from 2/17 negative. A CT of chest showed multiple areas of consolidation in the right upper lobes as well as the lower lobes bilateral concerning for multifocal pneumonia. Abx broadened to meropenem and vancomycin. Fluconazole also added in case fungal infection.     Interval History: renal function worse post dose of lasix given by nephrology as patient had poor UOP. No hyperkalemia. . Afebrile. Vanc level 46    Review of Systems   Unable to perform ROS: Intubated     Objective:     Vital Signs (Most Recent):  Temp: 98 °F (36.7 °C) (02/25/18 0730)  Pulse: 78 (02/25/18 0930)  Resp: (!) 28 (02/25/18 0930)  BP: 116/62 (02/25/18 0930)  SpO2: 95 % (02/25/18 0930) Vital Signs (24h Range):  Temp:  [97.5 °F (36.4 °C)-98.9 °F (37.2 °C)] 98 °F (36.7 °C)  Pulse:  [43-95] 78  Resp:  [0-30] 28  SpO2:  [88 %-95 %] 95 %  BP: (105-122)/(56-66) 116/62     Weight: 106.8 kg (235 lb 7.2 oz)  Body mass index is 36.88 kg/m².    Intake/Output Summary (Last 24 hours) at 02/25/18 1004  Last data filed at 02/25/18 0916   Gross per 24 hour   Intake          4943.74 ml   Output              465 ml   Net          4478.74 ml      Physical Exam   Constitutional: He appears well-developed.   Disheveled, unkept   HENT:   Head: Normocephalic and atraumatic.   ET tube in place   Eyes: Conjunctivae are normal. Right eye exhibits no discharge. Left eye exhibits no discharge.   Neck: Normal range of motion.   Cardiovascular: Normal rate and regular rhythm.  Exam reveals no gallop and no friction rub.    No murmur heard.  Pulmonary/Chest:   decreased breath sounds throughout with course sounds.    Abdominal: Soft. Bowel sounds are normal.   Musculoskeletal: Normal range of motion. He exhibits edema (+ 2 throughout ).   1+ edema throughtout    Neurological:   Sedated on vent   Skin: Skin is warm and dry. No erythema.   Burn marks on palmar aspect left thumb   Psychiatric: He has a normal mood and affect. His behavior is normal. Judgment and thought content normal.   Nursing note and vitals reviewed.      Significant Labs: All pertinent labs within the past 24 hours have been reviewed.    Significant Imaging: I have reviewed all pertinent imaging results/findings within the past 24 hours.  I have reviewed and interpreted all pertinent imaging results/findings within the past 24 hours.    Assessment/Plan:      * Acute respiratory failure with hypoxia and hypercarbia    Pt with multilobar pneumonia, 2/2 chronic aspiration vs atypical bacterial vs fungal. Worsened despite diuresis, ventilator support and broad spectrum abx. Given immunocompromised  (neutropenic) state on admission, fungal infection is considered. Cultures have not revealed an organism. Considered bronch, however patient is not stable enough for this and still thrombocytopenia. Doubt patchy infiltrations (seen on CT) and fever are due to TRALI (had multiple blood transfusions). Hem added steroids in case it was TRALI, but now discontinued as this is unlikely. Continue antifungal therapy. Hold vanc as level was very high today and is now in renal failure. Level is 46 today. Vanc initially continued instead of using linezolid due to concerns for thrombocytopenia. Has been on very high O2 requirements since intubation on 2/18. ID and pulm on board for co-management.         Aspiration pneumonia    Initially treated for CAP. Patient is a heavy alcohol drinker  Likely to be aspiration pneumonia complicated by immunocompromised state. Fungal infx?  Antibiotics broadened recently by ID. Antifungals added. New resp Cx sent to lab showed normal resp rosie.         Acquired pancytopenia    Patient's mother reports he is a heavy alcohol consumer and barely eats  Rapid HIV is negative. Hep C + with RNA  "of 75. Also folate, iron and B12 deficient. Combination of these can cause marrow failure  Hem/Onc  On board for co-management. Is s/p Granix for neutropenia x 1. Neutropenia improved. ANC > 1500 today.   Iron given with PRBCs when GI bleed was present. Also provided with platelets. No active bleeding at this time. Continue folic acid and B12 supplementation           Acute renal failure    Likely due to ATN with hypoperfusion in setting of septic shock, now worsened by supratherapeutic vanc level. Shock resolved and is not on vasopressor support.  Continue tube feeds. Hold IVFs as he is evidently volume overload. Failed trial of lasix. Although no hyperkalemia, patient is in renal failure.  today.   Nephrology on board for co-management. Not a candidate for long term HD  Strict I/Os  Will monitor        Pneumonia of left lower lobe due to infectious organism    As discussed above        Other schizophrenia    Reported by patient's mother. Is unkept and lived in poor living conditions. Unknown medical regimen patient was on          Other neutropenia    2/2 marrow failure          Alcohol dependence, continuous    As reported by patient's mother        Malnutrition of moderate degree    Tube feeds.         Septic shock    Not currently on vasopressor support. Will be used as needed for goal SBP > 90 mmHg          GIB (gastrointestinal bleeding)    Had low platelets and bleeding from rectal tube after placement  S/p transfusion of 2D platelets   S/p transfusion of 4U PRBC  Stool in flexi seal does not appear bloody nor melanotic finally after platelet transfusion  RESOLVED          Chronic hepatitis C virus infection    Reportedly patient was aware of "cirrhosis" diagnosis. Has Hep C and is heavy alcohol consumer. Hep C RNA 75  Is not in liver failure.         Essential hypertension    All anti-HTN medications for now.           VTE Risk Mitigation         Ordered     Medium Risk of VTE  Once      02/12/18 " 2213        Spoke with one of patient's son about code status and possible withdrawal of life saving intervention if patient continues to decline. At this point, unlikely he will improve. He agreed with DNR and withdrawal if needed. I need to speak with other son and daughter regarding goals of care. I will consult palliative care.     Critical care time spent on the evaluation and treatment of severe organ dysfunction, review of pertinent labs and imaging studies, discussions with consulting providers and discussions with patient/family: > 35 minutes.    Miriam Stephenson MD  Department of Hospital Medicine   [facility]

## 2018-02-25 NOTE — ASSESSMENT & PLAN NOTE
Pt with multilobar pneumonia, 2/2 chronic aspiration vs atypical bacterial vs fungal. Worsened despite diuresis, ventilator support and broad spectrum abx. Given immunocompromised  (neutropenic) state on admission, fungal infection is considered. Cultures have not revealed an organism. Considered bronch, however patient is not stable enough for this and still thrombocytopenia. Doubt patchy infiltrations (seen on CT) and fever are due to TRALI (had multiple blood transfusions). Hem added steroids in case it was TRALI, but now discontinued as this is unlikely. Continue antifungal therapy. Hold vanc as level was very high today and is now in renal failure. Level is 46 today. Vanc initially continued instead of using linezolid due to concerns for thrombocytopenia. Has been on very high O2 requirements since intubation on 2/18. ID and pulm on board for co-management.

## 2018-02-25 NOTE — CARE UPDATE
Met with patient's mother, daughter and son today. Discussed continued declined despite aggressive treatment for his infection. Prognosis is very poor at this point. Renal function continues to decline, heading towards dialysis, which he is not a candidate for. Family agrees with DNR. Agree with meeting tomorrow to discuss withdrawal of life saving interventions if renal function continues to decline, which I expect. I advised for family to call those who were close to Mr Bolton to be by his side during such difficult time. All questions answered to satisfaction and we reviewed patient's images, vitals and labs together. Palliative care consulted. Will change code status in chart.

## 2018-02-25 NOTE — ASSESSMENT & PLAN NOTE
Likely due to ATN with hypoperfusion in setting of septic shock, now worsened by supratherapeutic vanc level. Shock resolved and is not on vasopressor support.  Continue tube feeds. Hold IVFs as he is evidently volume overload. Failed trial of lasix. Although no hyperkalemia, patient is in renal failure.  today.   Nephrology on board for co-management. Not a candidate for long term HD  Strict I/Os  Will monitor

## 2018-02-25 NOTE — ASSESSMENT & PLAN NOTE
Worsening Cr. Vancomycin levels are supra therapeutic. Levels need to be monitored closely. Avoid concurrent nephrotoxic agents. UOP has decreased. I do not feel dialysis will benefit this patient.

## 2018-02-25 NOTE — PLAN OF CARE
Problem: Patient Care Overview  Goal: Plan of Care Review  Outcome: Ongoing (interventions implemented as appropriate)  Pt remains in ICU on ventilator. Pt on propofol and fentanyl for sedation and comfort. Titrated per order. Tube feedings changed to renal formula, at goal rate of 50. Flexi-seal in place, moderate amount of stool evacuated during shift. Minimal urine output via william catheter. Pt's family updated on plan of care. Code status changed to DNR. Denies further needs at this time.

## 2018-02-25 NOTE — ASSESSMENT & PLAN NOTE
Intubated for worsening respiratory distress. Multilobar PNA. CT scan with diffuse bilateral consolidation.  Patient ventilator requirements worsened overnight.   -Low Vt ventilation.   -Wean Vent according to ARDSnet protocol  -Broad spectrum Abx. ID following.  -Patient is still significantly volume up. Would hold IVF.

## 2018-02-25 NOTE — CONSULTS
"                                  Renal ICU Progress Note    Date of Admission: 2018  2:10 PM    Length of Stay:  13  Days    ROS:  N/a pte. On the vent.    Physical Exam:    Vitals:    18 1130 18 1145 18 1200 18 1215   BP: 122/66  123/66    Pulse: 77 78 74 77   Resp: (!) 28 (!) 28 (!) 28 (!) 28   Temp:   97.3 °F (36.3 °C)    TempSrc:   Axillary    SpO2: (!) 92% (!) 93% (!) 93% (!) 93%   Weight:       Height:           I/O last 3 completed shifts:  In: 7569.2 [I.V.:4399.2; NG/GT:3070; IV Piggyback:100]  Out: 635 [Urine:35; Stool:600]  I/O this shift:  In: 735.1 [I.V.:335.1; NG/GT:400]  Out: 3 [Urine:3]        Laboratories:      Recent Labs  Lab 18  0158   WBC 16.08*   RBC 3.46*   HGB 9.1*   HCT 27.7*   PLT 68*   MCV 80*   MCH 26.3*   MCHC 32.9       Recent Labs  Lab 18  0158   CALCIUM 7.5*   PROT 6.3      K 4.5   CO2 22*      *   CREATININE 4.8*   ALKPHOS 75   ALT 20   AST 74*   BILITOT 0.5     Phosphorus      1.1      2.9     6.7  Phosphorus     Vancomycin, Random  32.5 21.9 20.1 50.1  Vancomycin, Random     Vancomycin-Trough  16.9            Iron      25             Iron      TIBC      283             TIBC     Saturated Iron      9             Saturated Iron     Transferrin      191             Transferrin     Ferritin      198             Ferritin     Folate       3.8            Folate     Vitamin B-12       238            Vitamin       Sodium Urine Random             <20 Ref Range: 20 - 250 mmol/L Lab: [facility] Comment:  The random urine reference ranges provided were established  for 24 hour urine collections. No reference ranges exist for random urine specimens. Correlate clinically. " style="paddinpx 1px 0px 0px; text-align: right;"><20<20 Ref Range: 20 - 250 mmol/L Lab: [facility] Comment:  The random urine reference ranges provided were established  for 24 hour urine collections. No " "reference ranges exist for random urine specimens. Correlate clinically. " style="border: currentColor; border-image: none; width: 5px; height: 10px;" src="[email or web address]">   Sodium Urine      Creatinine, Random Ur             128.9   Creatinine       Ledbetter's Stain, Ur             No eo...   Ledbetter's          Microbiology Results (last 7 days)     Procedure Component Value Units Date/Time    Blood culture [[id number]] Collected:  02/22/18 1643    Order Status:  Completed Specimen:  Blood from Peripheral, Left  Hand Updated:  02/24/18 2103     Blood Culture, Routine No Growth to date     Blood Culture, Routine No Growth to date     Blood Culture, Routine No Growth to date    Blood culture [[id number]] Collected:  02/22/18 1635    Order Status:  Completed Specimen:  Blood from Line, Central Left  Neck Updated:  02/24/18 2103     Blood Culture, Routine No Growth to date     Blood Culture, Routine No Growth to date     Blood Culture, Routine No Growth to date    Culture, VAP (BAL) [[id number]] Collected:  02/22/18 1023    Order Status:  Completed Specimen:  Sputum from BAL, RLL Updated:  02/24/18 0903     VAP BAL CULTURE Normal respiratory rosie    Narrative:       Respiratory perform    Blood culture [[id number]] Collected:  02/18/18 1215    Order Status:  Completed Specimen:  Blood Updated:  02/23/18 1503     Blood Culture, Routine No growth after 5 days.    Blood culture [[id number]] Collected:  02/18/18 1210    Order Status:  Completed Specimen:  Blood Updated:  02/23/18 1503     Blood Culture, Routine No growth after 5 days.    Urine culture [[id number]] Collected:  02/20/18 1345    Order Status:  Completed Specimen:  Urine from Urine, Catheterized Updated:  02/22/18 0836     Urine Culture, Routine No growth    Culture, Respiratory with Gram Stain [[id number]] Collected:  02/18/18 0925    Order Status:  Completed Specimen:  " "Respiratory from Endotracheal Aspirate Updated:  02/20/18 0842     Respiratory Culture Normal respiratory rosie     Gram Stain (Respiratory) <10 epithelial cells per low power field.     Gram Stain (Respiratory) Moderate WBC's     Gram Stain (Respiratory) No organisms seen    Blood culture [[id number]] Collected:  02/14/18 2140    Order Status:  Completed Specimen:  Blood Updated:  02/19/18 2303     Blood Culture, Routine No growth after 5 days.    Blood culture [[id number]] Collected:  02/14/18 2120    Order Status:  Completed Specimen:  Blood Updated:  02/19/18 2303     Blood Culture, Routine No growth after 5 days.    AFB Culture & Smear [[id number]] Collected:  02/17/18 1135    Order Status:  Completed Specimen:  Respiratory from Sputum, Expectorated Updated:  02/19/18 2127     AFB Culture & Smear Culture in progress     AFB CULTURE STAIN No acid fast bacilli seen.          Diagnostic Tests:    X-Ray Chest 1 View [[id number]] Resulted: 02/24/18 1253   Order Status: Completed Updated: 02/24/18 1253   Narrative:     XR CHEST 1 VIEW.  Clinical History provided for exam:"MV ". Her NG tube and right internal jugular central venous catheter remain.  ET tube has been retracted slightly and has its tip at T2. The cardiac silhouette is exaggerated by AP portable technique and probably only mildly enlarged.  Pulmonary vascularity is not increased.  The right lung is similar to 2/23/18 at 2025.  There has been interval progression of the abnormal opacification in the left mid and lower lung since the previous examination.  There is likely left pleural fluid as previously.  The right lateral costophrenic sulcus is not blunted.  There is no  pneumothorax.  Visualized osseous structures are stable.   Impression:      Interval increase in opacification in the left mid and lower lung since 2/23/18 at 2025.        Electronically signed by: NE HASSAN MD  Date: 02/24/18  Time: 12:53          Assessment:    - Pneumonia aspiration " v.s. CAP  - Acute resp. Failure  - Septic shock? SIRS  - Pre-renal (FeNa+ 0.41%) Oliguric YOLI due to above  - Vancomycin toxicity  - GIB  - Pancytopenia  - Hep C infection  - Alcohol abuse  - Hx. Schyzophrenia    Plan:      - Agree with Dr. Stephenson, pte. NOT a candidate for Dialysis, comfort care advised.

## 2018-02-25 NOTE — EICU
eICU Note : Ventilator Rounds Vent day#7  Input/Output:+4.6  CXR: Bilateral Diffuse infiltrates  On Mechanical ventilator :settings:   Mode:PRVC      RR 24   PEEP 14  FiO2  70%  MAP  SaO2:  Last ABG 7.27 /52/65/-3/24  Checked Readiness to wean: P/F ratio 97   PEEP +14  Sedation on/off:sedated, Fentanyl and Propofol      Pertinent History and labs reviewed :58-year-old male,With hepatitis C, active smoker, admitted on 2/12 with community-acquired pneumonia and subsequently intubated on 2/18  Treatment /Intervention given: Increase RR 28, ABG in 1 hr        Saundra Gorman M.D  eICU Physician

## 2018-02-25 NOTE — PROGRESS NOTES
[facility]  Pulmonology  Progress Note    Patient Name: Dwight Bolton  MRN: [id number]  Admission Date: 2/12/2018  Hospital Length of Stay: 13 days  Code Status: DNR  Attending Provider: Miriam Horan MD  Primary Care Provider: Primary Doctor No   Principal Problem: Acute respiratory failure with hypoxia and hypercarbia    Subjective:     Interval History: Minimal UOP. Worsened ventilator requirements.     Review of Systems   Unable to perform ROS: Intubated     Objective:     Vital Signs (Most Recent):  Temp: 97.3 °F (36.3 °C) (02/25/18 1200)  Pulse: 84 (02/25/18 1445)  Resp: (!) 28 (02/25/18 1445)  BP: 118/64 (02/25/18 1430)  SpO2: 95 % (02/25/18 1445) Vital Signs (24h Range):  Temp:  [97.3 °F (36.3 °C)-98.9 °F (37.2 °C)] 97.3 °F (36.3 °C)  Pulse:  [43-88] 84  Resp:  [0-30] 28  SpO2:  [88 %-96 %] 95 %  BP: (110-123)/(57-66) 118/64     Weight: 106.8 kg (235 lb 7.2 oz)  Body mass index is 36.88 kg/m².    Intake/Output Summary (Last 24 hours) at 02/25/18 1459  Last data filed at 02/25/18 1400   Gross per 24 hour   Intake          4523.78 ml   Output              469 ml   Net          4054.78 ml      Physical Exam   Constitutional: He appears well-developed.   Disheveled, unkept   HENT:   Head: Normocephalic and atraumatic.   ET tube in place   Eyes: Conjunctivae are normal. Right eye exhibits no discharge. Left eye exhibits no discharge.   Neck: Normal range of motion.   Cardiovascular: Normal rate and regular rhythm.  Exam reveals no gallop and no friction rub.    No murmur heard.  Pulmonary/Chest:   decreased breath sounds throughout with course sounds.    Abdominal: Soft. Bowel sounds are normal.   Musculoskeletal: Normal range of motion. He exhibits edema (+ 2 throughout ).   1+ edema throughtout   Neurological:   Sedated on vent   Skin: Skin is warm and dry. No erythema.   Burn marks on palmar aspect left thumb   Psychiatric: He has a normal mood and affect. His behavior is normal. Judgment  and thought content normal.   Nursing note and vitals reviewed.      Significant Labs: All pertinent labs within the past 24 hours have been reviewed.    Significant Imaging: I have reviewed all pertinent imaging results/findings within the past 24 hours.  I have reviewed and interpreted all pertinent imaging results/findings within the past 24 hours.    Assessment/Plan:     * Acute respiratory failure with hypoxia and hypercarbia    Intubated for worsening respiratory distress. Multilobar PNA. CT scan with diffuse bilateral consolidation.  Patient ventilator requirements worsened overnight.   -Low Vt ventilation.   -Wean Vent according to ARDSnet protocol  -Broad spectrum Abx. ID following.  -Patient is still significantly volume up. Would hold IVF.               Acute renal failure    Worsening Cr. Vancomycin levels are supra therapeutic. Levels need to be monitored closely. Avoid concurrent nephrotoxic agents. UOP has decreased. I do not feel dialysis will benefit this patient.           Patient has no significant improvement in ventilatory support from multilobar PNA/ARDS. Additionally, patient now with renal failure. Given patient's marrow suppression, liver disease and GI bleed patient has MODS. Agree with Dr. Stephenson that goals of care should be addressed.     Recommend transition to comfort care.     Critical Care time: 35 minutes.     Critical Care time for the evaluation and treatment of severe organ dysfunction, review of pertinent labs, and discussions with primary team.      Ivan Esquivel MD  Pulmonology  [facility]

## 2018-02-25 NOTE — SUBJECTIVE & OBJECTIVE
Interval History: Minimal UOP. Worsened ventilator requirements.     Review of Systems   Unable to perform ROS: Intubated     Objective:     Vital Signs (Most Recent):  Temp: 97.3 °F (36.3 °C) (02/25/18 1200)  Pulse: 84 (02/25/18 1445)  Resp: (!) 28 (02/25/18 1445)  BP: 118/64 (02/25/18 1430)  SpO2: 95 % (02/25/18 1445) Vital Signs (24h Range):  Temp:  [97.3 °F (36.3 °C)-98.9 °F (37.2 °C)] 97.3 °F (36.3 °C)  Pulse:  [43-88] 84  Resp:  [0-30] 28  SpO2:  [88 %-96 %] 95 %  BP: (110-123)/(57-66) 118/64     Weight: 106.8 kg (235 lb 7.2 oz)  Body mass index is 36.88 kg/m².    Intake/Output Summary (Last 24 hours) at 02/25/18 1459  Last data filed at 02/25/18 1400   Gross per 24 hour   Intake          4523.78 ml   Output              469 ml   Net          4054.78 ml      Physical Exam   Constitutional: He appears well-developed.   Disheveled, unkept   HENT:   Head: Normocephalic and atraumatic.   ET tube in place   Eyes: Conjunctivae are normal. Right eye exhibits no discharge. Left eye exhibits no discharge.   Neck: Normal range of motion.   Cardiovascular: Normal rate and regular rhythm.  Exam reveals no gallop and no friction rub.    No murmur heard.  Pulmonary/Chest:   decreased breath sounds throughout with course sounds.    Abdominal: Soft. Bowel sounds are normal.   Musculoskeletal: Normal range of motion. He exhibits edema (+ 2 throughout ).   1+ edema throughtout   Neurological:   Sedated on vent   Skin: Skin is warm and dry. No erythema.   Burn marks on palmar aspect left thumb   Psychiatric: He has a normal mood and affect. His behavior is normal. Judgment and thought content normal.   Nursing note and vitals reviewed.      Significant Labs: All pertinent labs within the past 24 hours have been reviewed.    Significant Imaging: I have reviewed all pertinent imaging results/findings within the past 24 hours.  I have reviewed and interpreted all pertinent imaging results/findings within the past 24 hours.

## 2018-02-25 NOTE — PLAN OF CARE
Problem: Patient Care Overview  Goal: Plan of Care Review  Outcome: Ongoing (interventions implemented as appropriate)  Pt remains in ICU this shift on ventilator via endotracheal tube. Pt on propofol and fentanyl for comfort and sedation. Vital signs stable. Tube feedings at goal rate of 50, minimal residuals. Flexi-seal in place, moderate amount of stool evacuated. Minimal urine output. Pt updated on plan of care. Pt free of hospital acquired injuries and falls.

## 2018-02-26 PROBLEM — Z99.11 ON MECHANICALLY ASSISTED VENTILATION: Status: ACTIVE | Noted: 2018-01-01

## 2018-02-26 NOTE — CONSULTS
"Consult Note  Palliative Care      Consult Requested By: Miriam Horan MD  Reason for Consult:      Goals of care and code status     SUBJECTIVE:     History of Present Illness:     Chief Complaint   Patient presents with    Fall       " my legs keep giving out." No LOC. Right elbow pain. Hx of schizophrenia. "I want to get my cirrhosis checked out."       CC: Fall  HPI: This 57 y.o. male with  a past medical history of Hypertension, presents to the ED complaining of a fall after his legs gave out this morning. He reports hitting his head. Denies LOC/headache/neck pain. Denies N/V. Denies numbness, weakness, speech difficulty or any other associated sx.  He wants to get checked out for his heart problems and liver problems. He reports "my heart stops beating sometimes". Currently denies this sx. Reports intermittent chest pain for "yrs", however currently denies it. He had x3 episodes of emesis today. Denies abdominal pain and/or any urinary sx. Denies hallucinations, SI/HI. No prior medical intervention.      The history is provided by the patient. No  was used.     Principal Problem:Acute respiratory failure with hypoxia and hypercarbia     HPI:  Mr. Dwight Bolton is a 57 y.o. male with essential hypertension who presents to [facility] ED with complaints of fall today.  He has been feeling weak "for a while" and is always short of breath but says that he came here because he had a fall today.  He denies any loss of consciousness, lightheadedness, nor did he sustain any head trauma.  He denies any antecedent chest pain or palpitations, but he does say that he is always short of breath and it's not any worse than usual.  He denies any fever, chills, nausea, vomiting, nor headaches.  He decided to come here when he couldn't stand back up.  He is otherwise a very poor historian.     Hospital Course:  Mr. Bolton presented with severe sepsis likely secondary to pneumonia. CXR showed LLL consolidation. " Given no recent hospitalization for previous 3 months, he was initiated on ceftriaxone and azithromycin, plus supplemental O2. Workup significant for Hep C and pancytopenia with neutropenia. Retic count low, therefore Hem/Onc consulted for co-management. ID also consulted for abx co-management given immunocompromised state. Patient continued to decline despite antimicrobial regimen and supplemental O2. Placed on BiPAP. Transferred to the ICU on 2/17 for continued respiratory decompensation on BIPAP. On 2/18, required emergent intubation and very high O2 requirements. A rectal tube was placed and blood seen post placement. Transfused a total of 4U of PRBCs and 2 doses of platelets for acute blood loss anemia in setting of thrombocytopenia and GI bleed. GI consulted. Recommended conservative management. GI bleed resolved with platelet transfusion. Pancytopenia/neutropenia co-managed by hematology. Provided vit B12, folic acid and filgrastim with eventual resolution of pancytopenia/neutropenia. Diuresed without improvement. Resp Cx and BCx from 2/17 negative. A CT of chest showed multiple areas of consolidation in the right upper lobes as well as the lower lobes bilateral concerning for multifocal pneumonia. Abx broadened to meropenem and vancomycin. Fluconazole also added in case fungal infection. Renal function declined significantly until failure. Nephrology consulted. Deemed not a candidate for dialysis. Persistently with maximal vent support (FiO2 60-70%, high PEEP) since intubation (2/18). Met with mother, daughter and two sons. Discussed poor prognosis despite life support. Agreed with DNR. Palliative care consulted to discuss withdrawal of life saving interventions.      Interval History: worsening renal function. Anuric. Still on maximal vent support       Past Medical History:   Diagnosis Date    Hypertension      History reviewed. No pertinent surgical history.  History reviewed. No pertinent family  history.  Social History   Substance Use Topics    Smoking status: Current Every Day Smoker     Packs/day: 1.00    Smokeless tobacco: Never Used    Alcohol use No       Mental Status:   Sedated on Propofol and Fentanyl.    Palliative Performance Score:  30 (on TF via OGT)    OBJECTIVE:     Pain Assessment/symptom management:  Appears comfortable on sedation with Fentanyl and Propofol.  Dr. Liu at bedside requesting sedation to be held - after just a few minutes, patient coughing, trace spontaneous movement.  Will reassess later today.      Decision-Making Capacity:  Patient unable to speak on his own behalf.      Advanced Directives:  Living Will:  NO    Do Not Resuscitate Status: DNR   Medical Power of : NO      Living Arrangements:  Patient was living alone prior to admit.      Psychosocial, Spiritual, Cultural: Patient intubated on vent support, sedated, unable to answer.  Patient's most important priorities:  Patient's biggest concerns/fears:  Previous death/end of life care history:  Patient's goals/hopes:      ASSESSMENT/PLAN:     Patient lying supine, intubated on vent support settings PRVC 28/430/14/70%.  He appears older than stated age of 58 and looks acutely ill.  He is lying with his head flexed forward due to large julia in his hair, likely making it difficult to open eyes (if he can when off sedation).  He is unresponsive to verbal or tactile stimuli on sedation, however, bedside RN turned off sedation, and patient had eye movement (not opening) within a few minutes of being off.  Also had coughing. No acute distress.  Will reassess later today.      Respiratory effort is even and unlabored, but he often takes stacked breaths (a deep breath with the vent immediately followed by spontaneous shallow/abdominal breath).  Lungs are coarse bilaterally.  Heart tones audible, regular.  Abdomen grossly obese, soft, + bowel sounds.  TF @ 50 ml/h via OGT and tolerating well. VS: 98.2 oral, HR  89, /57, RR 28, sat 94%.  IVF:  Propofol 25 mcg/kg/min and Fentanyl 150 mcg/kg/h - both just now turned off.      Family not at bedside.  Spoke to his mother by telephone and she will be here with patient's sister around 12:30.  We will meet then.    Plan:  Educate.  Literature.  Goals of care and code status discussion.  Support.  Consult .    Recommendations:   Continue supportive care.   Family meeting today 2/26/18  (around 12:30 p.m.) to discuss goals of care.   Appreciate team input regarding patient's prognosis.     Consult Spiritual Care.    Palliative Care will continue to follow.     Thank you for this consult and the opportunity to participate in Mr. Bolton's care.      Jaqueline Guerra, DEEPAKN, RN, CCRN, CHPN   Palliative Care Nurse Coordinator   [facility]  [phone]      Time Spent:  10 minutes face to face assessment, 10 minutes team discussion.  30 minutes record review and charting.

## 2018-02-26 NOTE — PROGRESS NOTES
[facility]  Pulmonology  Progress Note    Patient Name: Dwight Bolton  MRN: [id number]  Admission Date: 2/12/2018  Hospital Length of Stay: 14 days  Code Status: DNR  Attending Provider: Miriam Horan MD  Primary Care Provider: Primary Doctor No   Principal Problem: Acute respiratory failure with hypoxia and hypercarbia    Subjective:     Interval History:   · Urine output remains poor  · Worsening leukocytosis and metabolic acidosis on labs today.    Objective:     Vital Signs (Most Recent):  Temp: 98.2 °F (36.8 °C) (02/26/18 0730)  Pulse: 84 (02/26/18 0904)  Resp: (!) 28 (02/26/18 0904)  BP: (!) 109/59 (02/26/18 0902)  SpO2: (!) 94 % (02/26/18 0904) Vital Signs (24h Range):  Temp:  [97.3 °F (36.3 °C)-98.2 °F (36.8 °C)] 98.2 °F (36.8 °C)  Pulse:  [73-91] 84  Resp:  [16-29] 28  SpO2:  [91 %-98 %] 94 %  BP: (108-137)/(58-78) 109/59     Weight: 106.8 kg (235 lb 7.2 oz)  Body mass index is 36.88 kg/m².      Intake/Output Summary (Last 24 hours) at 02/26/18 1031  Last data filed at 02/26/18 0600   Gross per 24 hour   Intake           2503.3 ml   Output              557 ml   Net           1946.3 ml       Physical Exam   Constitutional: He appears well-developed and well-nourished.   HENT:   Head: Normocephalic and atraumatic.   ETT in place, OG tube   Eyes: Pupils are equal, round, and reactive to light.   Neck: Normal range of motion. No tracheal deviation present.   Cardiovascular: Normal rate and regular rhythm.    No murmur heard.  Pulmonary/Chest: Effort normal. He has no wheezes. He has no rales.   Bilateral breath sounds   Abdominal: Soft. Bowel sounds are normal. He exhibits no distension. There is no tenderness.   Musculoskeletal: He exhibits edema.   Neurological:   Sedated on propofol and fentanyl during my exam.  Per bedside RN--> responding to painful stimuli on SAT.   Skin: Skin is warm and dry.   Nursing note and vitals reviewed.      Vents:  [facility] (02/26/18 0756)  Ventilator  Initiated: Yes (02/18/18 0916)  Set Rate: 28 bmp (02/26/18 0756)  Vt Set: 430 mL (02/26/18 0756)  PEEP/CPAP: 14 cmH20 (02/26/18 0756)  Oxygen Concentration (%): 70 (02/26/18 0904)  Peak Airway Pressure: 32.5 cmH2O (02/26/18 0756)  Total Ve: 10.8 mL (02/26/18 0756)  F/VT Ratio<105 (RSBI): (!) 60.05 (02/26/18 0756)    Lines/Drains/Airways     Central Venous Catheter Line                 Percutaneous Central Line Insertion/Assessment - triple lumen  02/22/18 0914 right internal jugular 4 days          Drain                 NG/OG Tube 02/18/18 0930 16 Fr. Center mouth 8 days         Urethral Catheter 02/17/18 1700 8 days         Rectal Tube 02/22/18 0325 rectal tube w/ balloon (indicate number of mLs) 4 days          Airway                 Airway - Non-Surgical 02/18/18 0914 Endotracheal Tube 8 days                Significant Labs:    CBC/Anemia Profile:    Recent Labs  Lab 02/25/18  0158 02/26/18  0230   WBC 16.08* 22.29*   HGB 9.1* 8.9*   HCT 27.7* 26.7*   PLT 68* 75*   MCV 80* 78*   RDW 19.9* 20.0*        Chemistries:    Recent Labs  Lab 02/25/18  0158 02/25/18  1835 02/26/18  0230    138 137   K 4.5 4.6 4.9    102 100   CO2 22* 20* 20*   * 127* 142*   CREATININE 4.8* 5.6* 6.2*   CALCIUM 7.5* 7.5* 7.5*   ALBUMIN 1.8* 1.8* 1.8*   PROT 6.3  --  6.1   BILITOT 0.5  --  0.5   ALKPHOS 75  --  81   ALT 20  --  25   AST 74*  --  75*   PHOS  --  6.9*  --          Significant Imaging:  CXR from 2/26 with bilateral opacities, perhaps a bit worse at the right base on today's film.    Assessment/Plan:     * Acute respiratory failure with hypoxia and hypercarbia    Intubated on 2.18.18 with acute hypoxemic respiratory failure 2/2 Multilobar PNA.   CT scan with diffuse bilateral consolidation.    Despite aggressive medical therapy the patient remains on a significant amount of respiratory support with an FiO2 of 70% and PEEP of 14. Goal O2 Sat~90%.  · Continue low tidal volume lung protective ventilation per  [facility] protocol.   · Continue antibiotics for treatment of multilobar pneumonia per ID recs.  · Concern for worsening volume overload in the setting of renal failure with poor urine output.              Acute renal failure    Creatinine continues to rise.  Patient is oligiouric.  Renally dose all medications.  Nephrology following.               Agree with goals of care discuss. Reviewed his current pulmonary issues with palliative care who plans to start discussion with the patient's family.    Critical Care Time: 30 minutes  Critical care was time spent personally by me on the following activities: evaluating this patient's organ dysfunction, development of treatment plan, discussing treatment plan with patient or surrogate and bedside caregivers, discussions with consultants, evaluation of patient's response to treatment, examination of patient, ordering and performing treatments and interventions, ordering and review of laboratory studies, ordering and review of radiographic studies, re-evaluation of patient's condition. This critical care time did not overlap with that of any other provider or involve time for any procedures.             Gala Liu MD  Pulmonology  [facility]

## 2018-02-26 NOTE — SUBJECTIVE & OBJECTIVE
Interval History:   · Urine output remains poor  · Worsening leukocytosis and metabolic acidosis on labs today.    Objective:     Vital Signs (Most Recent):  Temp: 98.2 °F (36.8 °C) (02/26/18 0730)  Pulse: 84 (02/26/18 0904)  Resp: (!) 28 (02/26/18 0904)  BP: (!) 109/59 (02/26/18 0902)  SpO2: (!) 94 % (02/26/18 0904) Vital Signs (24h Range):  Temp:  [97.3 °F (36.3 °C)-98.2 °F (36.8 °C)] 98.2 °F (36.8 °C)  Pulse:  [73-91] 84  Resp:  [16-29] 28  SpO2:  [91 %-98 %] 94 %  BP: (108-137)/(58-78) 109/59     Weight: 106.8 kg (235 lb 7.2 oz)  Body mass index is 36.88 kg/m².      Intake/Output Summary (Last 24 hours) at 02/26/18 1031  Last data filed at 02/26/18 0600   Gross per 24 hour   Intake           2503.3 ml   Output              557 ml   Net           1946.3 ml       Physical Exam   Constitutional: He appears well-developed and well-nourished.   HENT:   Head: Normocephalic and atraumatic.   ETT in place, OG tube   Eyes: Pupils are equal, round, and reactive to light.   Neck: Normal range of motion. No tracheal deviation present.   Cardiovascular: Normal rate and regular rhythm.    No murmur heard.  Pulmonary/Chest: Effort normal. He has no wheezes. He has no rales.   Bilateral breath sounds   Abdominal: Soft. Bowel sounds are normal. He exhibits no distension. There is no tenderness.   Musculoskeletal: He exhibits edema.   Neurological:   Sedated on propofol and fentanyl during my exam.  Per bedside RN--> responding to painful stimuli on SAT.   Skin: Skin is warm and dry.   Nursing note and vitals reviewed.      Vents:  Vent Mode: PRVC (02/26/18 0756)  Ventilator Initiated: Yes (02/18/18 0916)  Set Rate: 28 bmp (02/26/18 0756)  Vt Set: 430 mL (02/26/18 0756)  PEEP/CPAP: 14 cmH20 (02/26/18 0756)  Oxygen Concentration (%): 70 (02/26/18 0904)  Peak Airway Pressure: 32.5 cmH2O (02/26/18 0756)  Total Ve: 10.8 mL (02/26/18 0756)  F/VT Ratio<105 (RSBI): (!) 60.05 (02/26/18 0756)    Lines/Drains/Airways     Central Venous  Catheter Line                 Percutaneous Central Line Insertion/Assessment - triple lumen  02/22/18 0914 right internal jugular 4 days          Drain                 NG/OG Tube 02/18/18 0930 16 Fr. Center mouth 8 days         Urethral Catheter 02/17/18 1700 8 days         Rectal Tube 02/22/18 0325 rectal tube w/ balloon (indicate number of mLs) 4 days          Airway                 Airway - Non-Surgical 02/18/18 0914 Endotracheal Tube 8 days                Significant Labs:    CBC/Anemia Profile:    Recent Labs  Lab 02/25/18  0158 02/26/18  0230   WBC 16.08* 22.29*   HGB 9.1* 8.9*   HCT 27.7* 26.7*   PLT 68* 75*   MCV 80* 78*   RDW 19.9* 20.0*        Chemistries:    Recent Labs  Lab 02/25/18  0158 02/25/18  1835 02/26/18  0230    138 137   K 4.5 4.6 4.9    102 100   CO2 22* 20* 20*   * 127* 142*   CREATININE 4.8* 5.6* 6.2*   CALCIUM 7.5* 7.5* 7.5*   ALBUMIN 1.8* 1.8* 1.8*   PROT 6.3  --  6.1   BILITOT 0.5  --  0.5   ALKPHOS 75  --  81   ALT 20  --  25   AST 74*  --  75*   PHOS  --  6.9*  --          Significant Imaging:  CXR from 2/26 with bilateral opacities, perhaps a bit worse at the right base on today's film.

## 2018-02-26 NOTE — PROGRESS NOTES
"[facility] Medicine  Progress Note    Patient Name: Dwight Bolton  MRN: [id number]  Patient Class: IP- Inpatient   Admission Date: 2/12/2018  Length of Stay: 14 days  Attending Physician: Miriam Horan MD  Primary Care Provider: Primary Doctor No        Subjective:     Principal Problem:Acute respiratory failure with hypoxia and hypercarbia    HPI:  Mr. Dwight Bolton is a 57 y.o. male with essential hypertension who presents to [facility] ED with complaints of fall today.  He has been feeling weak "for a while" and is always short of breath but says that he came here because he had a fall today.  He denies any loss of consciousness, lightheadedness, nor did he sustain any head trauma.  He denies any antecedent chest pain or palpitations, but he does say that he is always short of breath and it's not any worse than usual.  He denies any fever, chills, nausea, vomiting, nor headaches.  He decided to come here when he couldn't stand back up.  He is otherwise a very poor historian.    Hospital Course:  Mr. Bolton presented with severe sepsis likely secondary to pneumonia. CXR showed LLL consolidation. Given no recent hospitalization for previous 3 months, he was initiated on ceftriaxone and azithromycin, plus supplemental O2. Workup significant for Hep C and pancytopenia with neutropenia. Retic count low, therefore Hem/Onc consulted for co-management. ID also consulted for abx co-management given immunocompromised state. Patient continued to decline despite antimicrobial regimen and supplemental O2. Placed on BiPAP. Transferred to the ICU on 2/17 for continued respiratory decompensation on BIPAP. On 2/18, required emergent intubation and very high O2 requirements. A rectal tube was placed and blood seen post placement. Transfused a total of 4U of PRBCs and 2 doses of platelets for acute blood loss anemia in setting of thrombocytopenia and GI bleed. GI consulted. Recommended conservative management. " GI bleed resolved with platelet transfusion. Pancytopenia/neutropenia co-managed by hematology. Provided vit B12, folic acid and filgrastim with eventual resolution of pancytopenia/neutropenia. Diuresed without improvement. Resp Cx and BCx from 2/17 negative. A CT of chest showed multiple areas of consolidation in the right upper lobes as well as the lower lobes bilateral concerning for multifocal pneumonia. Abx broadened to meropenem and vancomycin. Fluconazole also added in case fungal infection. Renal function declined significantly until failure. Nephrology consulted. Deemed not a candidate for dialysis. Persistently with maximal vent support (FiO2 60-70%, high PEEP) since intubation (2/18). Met with mother, daughter and two sons. Discussed poor prognosis despite life support. Agreed with DNR. Palliative care consulted to discuss withdrawal of life saving interventions.     Interval History: worsening renal function. Anuric. Still on maximal vent support    Review of Systems   Unable to perform ROS: Intubated     Objective:     Vital Signs (Most Recent):  Temp: 97.7 °F (36.5 °C) (02/26/18 0315)  Pulse: 80 (02/26/18 0756)  Resp: (!) 23 (02/26/18 0756)  BP: (!) 110/59 (02/26/18 0700)  SpO2: (!) 93 % (02/26/18 0756) Vital Signs (24h Range):  Temp:  [97.3 °F (36.3 °C)-98.1 °F (36.7 °C)] 97.7 °F (36.5 °C)  Pulse:  [73-91] 80  Resp:  [16-30] 23  SpO2:  [91 %-98 %] 93 %  BP: (108-137)/(58-78) 110/59     Weight: 106.8 kg (235 lb 7.2 oz)  Body mass index is 36.88 kg/m².    Intake/Output Summary (Last 24 hours) at 02/26/18 0833  Last data filed at 02/26/18 0600   Gross per 24 hour   Intake           2813.3 ml   Output              559 ml   Net           2254.3 ml      Physical Exam   Constitutional: He appears well-developed.   Disheveled, unkept   HENT:   Head: Normocephalic and atraumatic.   ET tube in place   Eyes: Conjunctivae are normal. Right eye exhibits no discharge. Left eye exhibits no discharge.   Neck: Normal  range of motion.   Cardiovascular: Normal rate and regular rhythm.  Exam reveals no gallop and no friction rub.    No murmur heard.  Pulmonary/Chest:   decreased breath sounds throughout with course sounds.    Abdominal: Soft. Bowel sounds are normal.   Musculoskeletal: Normal range of motion. He exhibits edema (+ 2 throughout ).   1+ edema throughtout   Neurological:   Sedated on vent   Skin: Skin is warm and dry. No erythema.   Burn marks on palmar aspect left thumb   Psychiatric: He has a normal mood and affect. His behavior is normal. Judgment and thought content normal.   Nursing note and vitals reviewed.      Significant Labs: All pertinent labs within the past 24 hours have been reviewed.    Significant Imaging: I have reviewed all pertinent imaging results/findings within the past 24 hours.  I have reviewed and interpreted all pertinent imaging results/findings within the past 24 hours.    Assessment/Plan:      * Acute respiratory failure with hypoxia and hypercarbia    Pt with multilobar pneumonia, 2/2 chronic aspiration vs atypical bacterial vs fungal. Worsened despite diuresis, ventilator support and broad spectrum abx. Given immunocompromised  (neutropenic) state on admission, fungal infection is considered. Cultures have not revealed an organism. Considered bronch, however patient is not stable enough for this and still thrombocytopenia. Doubt patchy infiltrations (seen on CT) and fever are due to TRALI (had multiple blood transfusions). Hem added steroids in case it was TRALI, but now discontinued as this is unlikely. Continue antifungal therapy. Held vanc due to supratherapeutic level (> 40) and is now in renal failure. Vanc initially continued instead of using linezolid due to concerns for thrombocytopenia. Has been on very high O2 requirements since intubation on 2/18. ID and pulm on board for co-management.         On mechanically assisted ventilation    On vent day # 9          Aspiration  pneumonia    Initially treated for CAP. Patient is a heavy alcohol drinker  Likely to be aspiration pneumonia complicated by immunocompromised state. Fungal infx?  Antibiotics broadened recently by ID. Antifungals added. New resp Cx sent to lab showed normal resp rosie.         Acquired pancytopenia    Patient's mother reports he is a heavy alcohol consumer and barely eats  Rapid HIV is negative. Hep C + with RNA of 75. Also folate, iron and B12 deficient. Combination of these can cause marrow failure  Hem/Onc  On board for co-management. Is s/p Granix for neutropenia x 1. Neutropenia improved. ANC > 1500 today.   Iron given with PRBCs when GI bleed was present. Also provided with platelets. No active bleeding at this time. Continue folic acid and B12 supplementation           Acute renal failure    Likely due to ATN with hypoperfusion in setting of septic shock, now worsened by supratherapeutic vanc level. Shock resolved and is not on vasopressor support.  Continue tube feeds. Hold IVFs as he is evidently volume overload. Failed trial of lasix. Although no hyperkalemia, patient is in renal failure.  today.   Nephrology on board for co-management. Not a candidate for long term HD  Strict I/Os  Will monitor        Pneumonia of left lower lobe due to infectious organism    As discussed above        Other schizophrenia    Reported by patient's mother. Is unkept and lived in poor living conditions. Unknown medical regimen patient was on          Other neutropenia    2/2 marrow failure          Alcohol dependence, continuous    As reported by patient's mother        Malnutrition of moderate degree    Tube feeds.         Septic shock    Not currently on vasopressor support. Will be used as needed for goal SBP > 90 mmHg          GIB (gastrointestinal bleeding)    Had low platelets and bleeding from rectal tube after placement  S/p transfusion of 2D platelets   S/p transfusion of 4U PRBC  Stool in flexi seal does not  "appear bloody nor melanotic finally after platelet transfusion  RESOLVED          Chronic hepatitis C virus infection    Reportedly patient was aware of "cirrhosis" diagnosis. Has Hep C and is heavy alcohol consumer. Hep C RNA 75  Is not in liver failure.         Essential hypertension    All anti-HTN medications for now.           VTE Risk Mitigation         Ordered     Medium Risk of VTE  Once      02/12/18 2212        Pending formal palliative care evaluation to proceed with withdrawal of life saving interventions.     Critical care time spent on the evaluation and treatment of severe organ dysfunction, review of pertinent labs and imaging studies, discussions with consulting providers and discussions with patient/family: > 35 minutes.    Miriam Stephenson MD  Department of Hospital Medicine   [facility]  "

## 2018-02-26 NOTE — PHYSICIAN QUERY
"PT Name: Dwight Bolton  MR #: [id number]     Physician Query Form - Documentation Clarification      CDS/: Mary Heredia RN CDI    Contact information: [email or web address]    This form is a permanent document in the medical record.     Query Date: February 26, 2018    By submitting this query, we are merely seeking further clarification of documentation. Please utilize your independent clinical judgment when addressing the question(s) below.    The Medical record reflects the following:    Supporting Clinical Findings Location in Medical Record   Acute renal failure    Likely due to ATN with hypoperfusion in setting of septic shock, now worsened by supratherapeutic vanc level. Shock resolved and is not on vasopressor support.  Continue tube feeds. Hold IVFs as he is evidently volume overload. Failed trial of lasix. Although no hyperkalemia, patient is in renal failure.  today.          [facility] medicine progress note 2/25 834 am     2/12 labs  BUN - 9  CR - 0.9  GFR - >60    2/26 labs   BUN - 147  CR - 6.2  GFR - 10    Nephrology on board for co-management. Not a candidate for long term HD - Strict I/Os - Will monitor       Labs                    Hospital medicine progress note 2/25 834 am                                                                            Doctor, Please specify diagnosis or diagnoses associated with above clinical findings.             Doctor, please further clarify if "Acute renal failure due to ATN" is ruled in                  and POA (present on admit) status.    Provider Use  Only          [    ]  ATN ruled in - POA ____Yes, ____No, __x___unable to determine.        [    ]  ATN ruled out            [    ]  Other, specify______________________.                                                                                                             [  ] Clinically undetermined            "

## 2018-02-26 NOTE — CONSULTS
"                                  Renal ICU Progress Note    Date of Admission: 2018  2:10 PM    Length of Stay:  14  Days    ROS:  N/a pte. On the vent.    Physical Exam:    Vitals:    18 1145 18 1200 18 1215 18 1300   BP:  (!) 141/65  131/66   Pulse: 107 109 108 (!) 111   Resp:    (!) 23   Temp:       TempSrc:       SpO2: 96% 96%  95%   Weight:       Height:           I/O last 3 completed shifts:  In: 5429.7 [I.V.:2334.7; NG/GT:2995; IV Piggyback:100]  Out: 922 [Urine:22; Stool:900]  I/O this shift:  In: 550.6 [I.V.:130.6; NG/GT:420]  Out: 10 [Urine:10]        Laboratories:      Recent Labs  Lab 18  0230   WBC 22.29*   RBC 3.44*   HGB 8.9*   HCT 26.7*   PLT 75*   MCV 78*   MCH 25.9*   MCHC 33.3       Recent Labs  Lab 18  0230   CALCIUM 7.5*   PROT 6.1      K 4.9   CO2 20*      *   CREATININE 6.2*   ALKPHOS 81   ALT 25   AST 75*   BILITOT 0.5     Phosphorus      1.1      2.9     6.7  Phosphorus     Vancomycin, Random  32.5 21.9 20.1 50.1  Vancomycin, Random     Vancomycin-Trough  16.9            Iron      25             Iron      TIBC      283             TIBC     Saturated Iron      9             Saturated Iron     Transferrin      191             Transferrin     Ferritin      198             Ferritin     Folate       3.8            Folate     Vitamin B-12       238            Vitamin       Sodium Urine Random             <20 Ref Range: 20 - 250 mmol/L Lab: [facility] Comment:  The random urine reference ranges provided were established  for 24 hour urine collections. No reference ranges exist for random urine specimens. Correlate clinically. " style="paddinpx 1px 0px 0px; text-align: right;"><20<20 Ref Range: 20 - 250 mmol/L Lab: [facility] Comment:  The random urine reference ranges provided were established  for 24 hour urine collections. No reference ranges exist for random urine " "specimens. Correlate clinically. " style="border: currentColor; border-image: none; width: 5px; height: 10px;" src="[email or web address]">   Sodium Urine      Creatinine, Random Ur             128.9   Creatinine       Ledbetter's Stain, Ur             No eo...   Ledbetter's          Microbiology Results (last 7 days)     Procedure Component Value Units Date/Time    Blood culture [[id number]] Collected:  02/22/18 1643    Order Status:  Completed Specimen:  Blood from Peripheral, Left  Hand Updated:  02/25/18 2103     Blood Culture, Routine No Growth to date     Blood Culture, Routine No Growth to date     Blood Culture, Routine No Growth to date     Blood Culture, Routine No Growth to date    Blood culture [[id number]] Collected:  02/22/18 1635    Order Status:  Completed Specimen:  Blood from Line, Central Left  Neck Updated:  02/25/18 2103     Blood Culture, Routine No Growth to date     Blood Culture, Routine No Growth to date     Blood Culture, Routine No Growth to date     Blood Culture, Routine No Growth to date    Culture, VAP (BAL) [[id number]] Collected:  02/22/18 1023    Order Status:  Completed Specimen:  Sputum from BAL, RLL Updated:  02/24/18 0903     VAP BAL CULTURE Normal respiratory rosie    Narrative:       Respiratory perform    Blood culture [[id number]] Collected:  02/18/18 1215    Order Status:  Completed Specimen:  Blood Updated:  02/23/18 1503     Blood Culture, Routine No growth after 5 days.    Blood culture [[id number]] Collected:  02/18/18 1210    Order Status:  Completed Specimen:  Blood Updated:  02/23/18 1503     Blood Culture, Routine No growth after 5 days.    Urine culture [[id number]] Collected:  02/20/18 1345    Order Status:  Completed Specimen:  Urine from Urine, Catheterized Updated:  02/22/18 0836     Urine Culture, Routine No growth    Culture, Respiratory with Gram Stain [[id number]] Collected:  " "02/18/18 0925    Order Status:  Completed Specimen:  Respiratory from Endotracheal Aspirate Updated:  02/20/18 0842     Respiratory Culture Normal respiratory rosie     Gram Stain (Respiratory) <10 epithelial cells per low power field.     Gram Stain (Respiratory) Moderate WBC's     Gram Stain (Respiratory) No organisms seen    Blood culture [[id number]] Collected:  02/14/18 2140    Order Status:  Completed Specimen:  Blood Updated:  02/19/18 2303     Blood Culture, Routine No growth after 5 days.    Blood culture [[id number]] Collected:  02/14/18 2120    Order Status:  Completed Specimen:  Blood Updated:  02/19/18 2303     Blood Culture, Routine No growth after 5 days.    AFB Culture & Smear [[id number]] Collected:  02/17/18 1135    Order Status:  Completed Specimen:  Respiratory from Sputum, Expectorated Updated:  02/19/18 2127     AFB Culture & Smear Culture in progress     AFB CULTURE STAIN No acid fast bacilli seen.          Diagnostic Tests:    X-Ray Chest 1 View [[id number]] Resulted: 02/24/18 1253   Order Status: Completed Updated: 02/24/18 1253   Narrative:     XR CHEST 1 VIEW.  Clinical History provided for exam:"MV ". Her NG tube and right internal jugular central venous catheter remain.  ET tube has been retracted slightly and has its tip at T2. The cardiac silhouette is exaggerated by AP portable technique and probably only mildly enlarged.  Pulmonary vascularity is not increased.  The right lung is similar to 2/23/18 at 2025.  There has been interval progression of the abnormal opacification in the left mid and lower lung since the previous examination.  There is likely left pleural fluid as previously.  The right lateral costophrenic sulcus is not blunted.  There is no  pneumothorax.  Visualized osseous structures are stable.   Impression:      Interval increase in opacification in the left mid and lower lung since 2/23/18 at 2025.        Electronically signed by: NE HASSAN MD  Date: 02/24/18  Time: " 12:53          Assessment:    - Pneumonia aspiration v.s. CAP  - Acute resp. Failure  - Septic shock? SIRS  - Pre-renal Oliguric YOLI due to above  - Vancomycin toxicity  - GIB  - Pancytopenia  - Hep C infection  - Alcohol abuse  - Hx. Schyzophrenia    Plan:      - Agree with Dr. Stehpenson and Gala Guerra NP. pte. NOT a candidate for Dialysis, comfort care advised.

## 2018-02-26 NOTE — SUBJECTIVE & OBJECTIVE
Interval History: worsening renal function. Anuric. Still on maximal vent support    Review of Systems   Unable to perform ROS: Intubated     Objective:     Vital Signs (Most Recent):  Temp: 97.7 °F (36.5 °C) (02/26/18 0315)  Pulse: 80 (02/26/18 0756)  Resp: (!) 23 (02/26/18 0756)  BP: (!) 110/59 (02/26/18 0700)  SpO2: (!) 93 % (02/26/18 0756) Vital Signs (24h Range):  Temp:  [97.3 °F (36.3 °C)-98.1 °F (36.7 °C)] 97.7 °F (36.5 °C)  Pulse:  [73-91] 80  Resp:  [16-30] 23  SpO2:  [91 %-98 %] 93 %  BP: (108-137)/(58-78) 110/59     Weight: 106.8 kg (235 lb 7.2 oz)  Body mass index is 36.88 kg/m².    Intake/Output Summary (Last 24 hours) at 02/26/18 0833  Last data filed at 02/26/18 0600   Gross per 24 hour   Intake           2813.3 ml   Output              559 ml   Net           2254.3 ml      Physical Exam   Constitutional: He appears well-developed.   Disheveled, unkept   HENT:   Head: Normocephalic and atraumatic.   ET tube in place   Eyes: Conjunctivae are normal. Right eye exhibits no discharge. Left eye exhibits no discharge.   Neck: Normal range of motion.   Cardiovascular: Normal rate and regular rhythm.  Exam reveals no gallop and no friction rub.    No murmur heard.  Pulmonary/Chest:   decreased breath sounds throughout with course sounds.    Abdominal: Soft. Bowel sounds are normal.   Musculoskeletal: Normal range of motion. He exhibits edema (+ 2 throughout ).   1+ edema throughtout   Neurological:   Sedated on vent   Skin: Skin is warm and dry. No erythema.   Burn marks on palmar aspect left thumb   Psychiatric: He has a normal mood and affect. His behavior is normal. Judgment and thought content normal.   Nursing note and vitals reviewed.      Significant Labs: All pertinent labs within the past 24 hours have been reviewed.    Significant Imaging: I have reviewed all pertinent imaging results/findings within the past 24 hours.  I have reviewed and interpreted all pertinent imaging results/findings within  the past 24 hours.

## 2018-02-26 NOTE — EICU
eICU Note : Ventilator Rounds Vent day # 8  Input/Output:+2222.5  CXR: Bilateral Pneumonia  On Mechanical ventilator :settings:   Mode: PRVC     RR 28  PEEP+14  FiO2  70    PIP  SaO2 92  Last AB.31/46/65/23/90  Checked Readiness to wean: P/F ratio 97   PEEP +14  Sedation on/off: Fentanyl and Propofol    Pertinent History and labs reviewed :58-year-old male,With hepatitis C, active smoker, admitted on  with community-acquired pneumonia and subsequently intubated on   Treatment /Intervention given: Cont current treatment, defer to primary team        Saundra Gorman M.D  eICU Physician

## 2018-02-26 NOTE — ASSESSMENT & PLAN NOTE
Pt with multilobar pneumonia, 2/2 chronic aspiration vs atypical bacterial vs fungal. Worsened despite diuresis, ventilator support and broad spectrum abx. Given immunocompromised  (neutropenic) state on admission, fungal infection is considered. Cultures have not revealed an organism. Considered bronch, however patient is not stable enough for this and still thrombocytopenia. Doubt patchy infiltrations (seen on CT) and fever are due to TRALI (had multiple blood transfusions). Hem added steroids in case it was TRALI, but now discontinued as this is unlikely. Continue antifungal therapy. Held vanc due to supratherapeutic level (> 40) and is now in renal failure. Vanc initially continued instead of using linezolid due to concerns for thrombocytopenia. Has been on very high O2 requirements since intubation on 2/18. ID and pulm on board for co-management.

## 2018-02-26 NOTE — PROGRESS NOTES
02/26/18 0756   Patient Assessment/Suction   Level of Consciousness (AVPU) unresponsive   Respiratory Effort Normal;Unlabored   Expansion/Accessory Muscles/Retractions expansion symmetric   All Lung Fields Breath Sounds coarse   Rhythm/Pattern, Respiratory artificial airway;ventilator assisted   Cough Frequency with stimulation   Cough Type fair;productive   Suction Method in-line suction catheter (closed)   $ Suction Charges Inline Suction Procedure Stat Charge   Sputum Amount small   Sputum Color white-yellow   Sputum Consistency thick   PRE-TX-O2-ETCO2   O2 Device (Oxygen Therapy) ventilator   Oxygen Concentration (%) 70   SpO2 (!) 93 %   Pulse Oximetry Type Continuous   $ Pulse Oximetry - Multiple Charge Pulse Oximetry - Multiple   Pulse 80   Resp (!) 23   Aerosol Therapy   $ Aerosol Therapy Charges Aerosol Treatment   Respiratory Treatment Status given   SVN/Inhaler Treatment Route in-line   Position During Treatment HOB at 30 degrees   Patient Tolerance good   Post-Treatment   Post-treatment Heart Rate (beats/min) 84   Post-treatment Resp Rate (breaths/min) 28   All Fields Breath Sounds unchanged       Airway - Non-Surgical 02/18/18 0914 Endotracheal Tube   Placement Date/Time: 02/18/18 0914   Present Prior to Hospital Arrival?: No  Method of Intubation: Direct laryngoscopy  Inserted by: Anesthesia MD  Staff/Resident Name(s): MD Kwesi  Airway Device: Endotracheal Tube  Intubated: Postinduction  Airway ...   Secured at 25 cm   Measured At Lips   Secured Location Left  (ETT moved from center to left)   Secured by Commercial tube tao   Bite Block left;secure and patent   Site Condition Cool;Dry   Status Intact;Secured;Patent   Site Assessment Clean;Dry   Cuff Pressure 24 cm H2O   Vent Select   Conventional Vent Y   Humidification Changed? Yes   $ Ventilator Subsequent 1   Charged w/in last 24h YES   Preset Conventional Ventilator Settings   Vent ID [id number]   Vent Type Servo i   Vent Mode PRVC   Humidity HME   Set  Rate 28 bmp   Vt Set 430 mL   PEEP/CPAP 14 cmH20   Insp Time 0.9 Sec(s)   Insp Rise Time  0.15 %   Patient Ventilator Parameters   Resp Rate Total 28 br/min   Peak Airway Pressure 32.5 cmH2O   Mean Airway Pressure 21.4 cmH20   Exhaled Vt 383 mL   Total Ve 10.8 mL   Conventional Ventilator Alarms   Alarms On Y   Ve High Alarm 40 L/min   Ve Low Alarm 2 L/min   Resp Rate High Alarm 45 br/min   Resp Rate Low Alarm 5   Ready to Wean/Extubation Screen   FIO2<=50 (chart decimal) (!) 0.7   MV<16L (chart vol.) 10.8   PEEP <=8 (chart #) (!) 14   Ready to Wean Parameters   F/VT Ratio<105 (RSBI) (!) 60.05   Airway Safety   Ambu bag with the patient? Yes, Adult Ambu   Is mask with the patient? Yes, Adult Mask

## 2018-02-26 NOTE — PROGRESS NOTES
[facility]  Adult Nutrition  Consult Note    SUMMARY     Recommendations    Recommendation/Intervention:   1. Current TF exceeding total kcal needs (150% of upper end).   -Rec decrease goal rate to 35 ml/hr  -To provide: 1680 kcal, 76 g prom 602 ml fluid (2282 kcal with propofol/dextrose)  -or Peptamen Bariatric @ 40 ml/hr  ( To provide: 1621 kcal, 88 g pro, 806 ml fluid, 562 mg Phos, 1117 mg K)   2. RD to monitor    Goals: Initiate nutrition within 48 hrs  Nutrition Goal Status: goal met  Communication of RD Recs: discussed on rounds    Continuum of Care Plan    Referral to Outpatient Services:  (D/C planning: Too soon to determine)    Reason for Assessment    Reason for Assessment: RD follow-up  Diagnosis:  (septic shock; respiratory faillure; aspiration)  Relevent Medical History: HTN   Interdisciplinary Rounds: attended     General Information Comments: Intubated with propofol running. ARF- nephrology does not want to utilize HD at this time. + sepsis. MD reports plan for withdrawal of care possibly 2/27. TF changed to Novasource Renal.      Nutrition Prescription Ordered    Current Diet Order: NPO     Current Nutrition Support Formula Ordered: Novasource Renal  Current Nutrition Support Rate Ordered: 50 (ml)  Current Nutrition Support Frequency Ordered: ml/hr        Evaluation of Received Nutrients/Fluid Intake    Enteral Calories (kcal): 2400  Enteral Protein (gm): 109  Enteral (Free Water) Fluid (mL): 860  Free Water Flush Fluid (mL): 810      Other Calories (kcal): 661 (propofol 591; dextrose 70)  Total Calories (kcal): 3061     Energy Calories Required: exceed needs  % Kcal Needs: 150%      Protein Required: meeting needs  % Protein Needs: 100%      Total Fluid Intake (mL): 1670      I/O: 927/4529       Fluid Required:  (per MD)  Comments: LBM: 2/26  Tolerance: tolerating    Nutrition Risk Screen     Nutrition Risk Screen: no indicators present    Nutrition/Diet History     Meal/Snack  "Patterns: CLEMENTE   Factors Affecting Nutritional Intake: NPO, on mechanical ventilation     Labs/Tests/Procedures/Meds     Pertinent Labs Reviewed: reviewed  Pertinent Labs Comments: alb 1.9; GFR 46  Pertinent Medications Reviewed: reviewed  Pertinent Medications Comments: propofol, abx    Physical Findings    Overall Physical Appearance: obese, on ventilator support  Tubes: orogastric tube  Oral/Mouth Cavity: tooth/teeth missing  Skin: dermatitis (of spine)    Anthropometrics    Temp: 98.2 °F (36.8 °C)     Height: 5' 7" (170.2 cm)  Weight Method: Bed Scale  Weight: 106.8 kg (235 lb 7.2 oz)  Ideal Body Weight (IBW), Male: 148 lb     % Ideal Body Weight, Male (lb): 159.09 lb     BMI (Calculated): 37  BMI Grade: 35 - 39.9 - obesity - grade II     Estimated/Assessed Needs    Weight Used For Calorie Calculations: 106.8 kg (235 lb 7.2 oz)      Energy Calorie Requirements (kcal): 2081 kcal (7823-8340 kcals for permissive underfeeding)  Energy Need Method: [place] (modified)     RMR (Grayson-St. Jeor Equation): 1836.62      Weight Used For Protein Calculations: 67 kg (147 lb 11.3 oz) (IBW)  Protein Requirements: 105-135g (85-105g with current ARF and no HD).     RDA Method (mL): 2081      Assessment and Plan    Nutrition Dx: Inadequate Energy Intake r/t mechanical ventilation as evidenced by: NPO  Nutrition Dx Status: Continues      Monitor and Evaluation    Food and Nutrient Intake: energy intake, enteral nutrition intake  Food and Nutrient Adminstration: diet order, enteral and parenteral nutrition administration  Knowledge/Beliefs/Attitudes: food and nutrition knowledge/skill  Physical Activity and Function: nutrition-related ADLs and IADLs  Anthropometric Measurements: weight, weight change  Biochemical Data, Medical Tests and Procedures: electrolyte and renal panel  Nutrition-Focused Physical Findings: overall appearance    Nutrition Risk    Level of Risk:  (2 x week)    Nutrition Follow-Up    RD Follow-up?: " Yes

## 2018-02-26 NOTE — ASSESSMENT & PLAN NOTE
Intubated on 2.18.18 with acute hypoxemic respiratory failure 2/2 Multilobar PNA.   CT scan with diffuse bilateral consolidation.    Despite aggressive medical therapy the patient remains on a significant amount of respiratory support with an FiO2 of 70% and PEEP of 14. Goal O2 Sat~90%.  · Continue low tidal volume lung protective ventilation per ARDSNet protocol.   · Continue antibiotics for treatment of multilobar pneumonia per ID recs.  · Concern for worsening volume overload in the setting of renal failure with poor urine output.

## 2018-02-26 NOTE — ASSESSMENT & PLAN NOTE
Creatinine continues to rise.  Patient is oligiouric.  Renally dose all medications.  Nephrology following.

## 2018-02-26 NOTE — PLAN OF CARE
02/26/18 1054   Discharge Reassessment   Assessment Type Discharge Planning Reassessment   Provided patient/caregiver education on the expected discharge date and the discharge plan No   Do you have any problems affording any of your prescribed medications? No   Discharge Plan A Other   Patient choice form signed by patient/caregiver No   Can the patient answer the patient profile reliably? No, cognitively impaired   How does the patient rate their overall health at the present time? Poor   Describe the patient's ability to walk at the present time. Does not walk or unable to take any steps at all   How often would a person be available to care for the patient? Occasionally   Number of comorbid conditions (as recorded on the chart) Three   During the past month, has the patient often been bothered by feeling down, depressed or hopeless? No  (record)   During the past month, has the patient often been bothered by little interest or pleasure in doing things? No  (record)   Patient remains in ICU on vent support. INESSA reviewed chart, discussed with Dr Horan. Aware that  has now met again with patient mother and patient's 3 adult children (noted on 2/25). Aware that Palliative Care JONNATHAN Cedeño is consulted. INESSA will continue to follow in ICU and assist as needed.

## 2018-02-26 NOTE — PROGRESS NOTES
"PALLIATIVE CARE PROGRESS NOTE:    Had long meaningful meeting with patient's mother Alberta and daughter Ryann.  They are able to verbalize good understanding of patient's condition and poor prognosis.  We reviewed his diagnosis and worsening renal failure, and that he is not a candidate for hemodialysis. His daughter states her own daughter had to have a kidney transplant a few years ago.  They have very good insight into quality of life issues, and feel his QOL has been very poor for a long time.      We discussed goals of care, and while they feel comfort is the main goal at this point, they do want to continue supportive treatment for a few more days to see if he can turn the corner and to give mother a little more time to adjust to this poor prognosis.      We discussed code status and they reaffirm decision for DNR.  They do NOT WANT CPR/RESUSCITATION.  They understand he cannot stop breathing because he is on the vent, but if he deteriorates they do not want heroic measures.      Provided literature, "Hard Choices For [place] People" and fact sheet on cpr to aid in their understanding.  Ample time was allowed for discussion of concerns and feelings.  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  Subsequent to schizophrenia he distanced himself from family and friends, and neglected himself.  He was in [facility] 3 years ago and they released him to the ACT clinic and he was getting injections (he had been noncompliant with oral meds).  All questions were asked and answered to their satisfaction.  Emotional " support provided.  Family verbalized appreciation for care and concern given.      Plan/recommendations:   Continue supportive care for the next few days.   Will meet with family again on Wednesday 2/28/18 at 12:30 and we will discuss likely need for withdrawal.    Palliative Care continuing to follow.    Jaqueline Guerra, DEEPAKN, RN, CCRN, CHPN   Palliative Care Nurse Coordinator   [facility]  [phone]

## 2018-02-26 NOTE — PROGRESS NOTES
Received patient on vent settings PRVC 430/28/+14/FIO2 70% ETT size 8.0 secured at 25cm at the lip Ambu bag and mask at bed side

## 2018-02-27 PROBLEM — E44.0 MALNUTRITION OF MODERATE DEGREE: Chronic | Status: ACTIVE | Noted: 2018-01-01

## 2018-02-27 PROBLEM — R65.21 SEPTIC SHOCK: Status: RESOLVED | Noted: 2018-01-01 | Resolved: 2018-01-01

## 2018-02-27 PROBLEM — A41.9 SEPTIC SHOCK: Status: RESOLVED | Noted: 2018-01-01 | Resolved: 2018-01-01

## 2018-02-27 PROBLEM — F10.20 ALCOHOL DEPENDENCE, CONTINUOUS: Chronic | Status: ACTIVE | Noted: 2018-01-01

## 2018-02-27 PROBLEM — F20.89 OTHER SCHIZOPHRENIA: Chronic | Status: ACTIVE | Noted: 2018-01-01

## 2018-02-27 PROBLEM — K92.2 GIB (GASTROINTESTINAL BLEEDING): Status: RESOLVED | Noted: 2018-01-01 | Resolved: 2018-01-01

## 2018-02-27 NOTE — PROGRESS NOTES
"[facility] Hospital Medicine  Progress Note    Patient Name: Dwight Bolton  MRN: [id number]  Patient Class: IP- Inpatient   Admission Date: 2/12/2018  Length of Stay: 15 days  Attending Physician: Nancy Wilder MD  Primary Care Provider: Primary Doctor No        Subjective:     Principal Problem:Acute respiratory failure with hypoxia and hypercarbia    HPI:  Mr. Dwight Bolton is a 57 y.o. male with essential hypertension who presents to [facility] ED with complaints of fall today.  He has been feeling weak "for a while" and is always short of breath but says that he came here because he had a fall today.  He denies any loss of consciousness, lightheadedness, nor did he sustain any head trauma.  He denies any antecedent chest pain or palpitations, but he does say that he is always short of breath and it's not any worse than usual.  He denies any fever, chills, nausea, vomiting, nor headaches.  He decided to come here when he couldn't stand back up.  He is otherwise a very poor historian.    Hospital Course:  Mr. Bolton presented with severe sepsis likely secondary to pneumonia. CXR showed LLL consolidation. Given no recent hospitalization for previous 3 months, he was initiated on ceftriaxone and azithromycin, plus supplemental O2. Workup significant for Hep C and pancytopenia with neutropenia. Retic count low, therefore Hem/Onc consulted for co-management. ID also consulted for abx co-management given immunocompromised state. Patient continued to decline despite antimicrobial regimen and supplemental O2. Placed on BiPAP. Transferred to the ICU on 2/17 for continued respiratory decompensation on BIPAP. On 2/18, required emergent intubation and very high O2 requirements. A rectal tube was placed and blood seen post placement. Transfused a total of 4U of PRBCs and 2 doses of platelets for acute blood loss anemia in setting of thrombocytopenia and GI bleed. GI consulted. Recommended conservative " management. GI bleed resolved with platelet transfusion. Pancytopenia/neutropenia co-managed by hematology. Provided vit B12, folic acid and filgrastim with eventual resolution of pancytopenia/neutropenia. Diuresed without improvement. Resp Cx and BCx from 2/17 negative. A CT of chest showed multiple areas of consolidation in the right upper lobes as well as the lower lobes bilateral concerning for multifocal pneumonia. Abx broadened to meropenem and vancomycin. Fluconazole also added in case fungal infection. Renal function declined significantly until failure. Nephrology consulted. Deemed not a candidate for dialysis. Persistently with maximal vent support (FiO2 60-70%, high PEEP) since intubation (2/18). Met with mother, daughter and two sons. Discussed poor prognosis despite life support. Agreed with DNR. Palliative care consulted to discuss withdrawal of life saving interventions. Family meeting planned for 2/28/2018 at 12:30.    Interval History: renal function continues to decline. Very uremic. No UOP. Tube feeds held 2/2 high residuals. Stool output slowing. Still requiring high vent assistance.    Review of Systems   Unable to perform ROS: Intubated     Objective:     Vital Signs (Most Recent):  Temp: 98.9 °F (37.2 °C) (02/27/18 1115)  Pulse: 107 (02/27/18 1300)  Resp: (!) 30 (02/27/18 1300)  BP: 128/66 (02/27/18 1300)  SpO2: (!) 94 % (02/27/18 1300) Vital Signs (24h Range):  Temp:  [98.3 °F (36.8 °C)-99.6 °F (37.6 °C)] 98.9 °F (37.2 °C)  Pulse:  [100-114] 107  Resp:  [0-36] 30  SpO2:  [93 %-99 %] 94 %  BP: ()/(51-75) 128/66     Weight: 111.1 kg (244 lb 14.9 oz)  Body mass index is 38.36 kg/m².    Intake/Output Summary (Last 24 hours) at 02/27/18 1308  Last data filed at 02/27/18 1300   Gross per 24 hour   Intake          2812.42 ml   Output             2211 ml   Net           601.42 ml      Physical Exam   Constitutional: He appears well-developed.   Disheveled, unkept   HENT:   Head: Normocephalic  and atraumatic.   ET tube in place   Eyes: Conjunctivae are normal. Right eye exhibits no discharge. Left eye exhibits no discharge.   Neck: Normal range of motion.   Cardiovascular: Normal rate and regular rhythm.  Exam reveals no gallop and no friction rub.    No murmur heard.  Pulmonary/Chest:   decreased breath sounds throughout with course sounds.    Abdominal: Soft. He exhibits no distension.   Obese, hypoactive bowel sounds   Musculoskeletal: Normal range of motion. He exhibits edema (+ 2 throughout ).   Neurological:   Sedated on vent   Skin: Skin is warm and dry. No erythema.   Burn marks on palmar aspect left thumb   Psychiatric: He has a normal mood and affect. His behavior is normal. Judgment and thought content normal.   Nursing note and vitals reviewed.      Significant Labs: All pertinent labs within the past 24 hours have been reviewed.    Significant Imaging: I have reviewed all pertinent imaging results/findings within the past 24 hours.  I have reviewed and interpreted all pertinent imaging results/findings within the past 24 hours.    Assessment/Plan:      * Acute respiratory failure with hypoxia and hypercarbia    Pt with multilobar pneumonia, 2/2 chronic aspiration vs atypical bacterial vs fungal. Worsened despite diuresis, ventilator support and broad spectrum abx. Given immunocompromised  (neutropenic) state on admission, fungal infection is considered. Cultures have not revealed an organism. Considered bronch, however patient is not stable enough for this and still thrombocytopenia. Doubt patchy infiltrations (seen on CT) and fever are due to TRALI (had multiple blood transfusions). Hem added steroids in case it was TRALI, but now discontinued as this is unlikely. Continue antifungal therapy. Held vanc due to supratherapeutic level (> 40) and is now in renal failure. Vanc initially continued instead of using linezolid due to concerns for thrombocytopenia. Has been on very high O2  "requirements since intubation on 2/18. ID and pulm on board for co-management.         Aspiration pneumonia    Initially treated for CAP. Patient is a heavy alcohol drinker  Likely to be aspiration pneumonia complicated by immunocompromised state. Fungal infx?  Antibiotics broadened recently by ID. Antifungals added. New resp Cx sent to lab showed normal resp rosie.         Acute renal failure    Likely due to ATN with hypoperfusion in setting of septic shock, now worsened by supratherapeutic vanc level. Shock resolved and is not on vasopressor support.  Hold IVFs as he is evidently volume overload. Failed trial of lasix. Although no hyperkalemia, patient is in renal failure. BUN >100 and climbing.   Nephrology on board for co-management. Not a candidate for long term HD  Strict I/Os.        Acquired pancytopenia    Patient's mother reports he is a heavy alcohol consumer and barely eats  Rapid HIV is negative. Hep C + with RNA of 75. Also folate, iron and B12 deficient. Combination of these can cause marrow failure  Hem/Onc  On board for co-management. Is s/p Granix for neutropenia x 1. Neutropenia resolved.  Iron given with RBCs when GI bleed was present. Also provided with platelets. No active bleeding at this time. Continue folic acid and B12 supplementation.        On mechanically assisted ventilation    On vent day #10        Other schizophrenia    Reported by patient's mother. Is unkept and lived in poor living conditions. Unknown medical regimen but I suspect he was not compliant.        Other neutropenia    2/2 marrow failure        Alcohol dependence, continuous    As reported by patient's mother        Malnutrition of moderate degree    Tube feeds held 2/2 high residuals        Chronic hepatitis C virus infection    Reportedly patient was aware of "cirrhosis" diagnosis. Has Hep C and is heavy alcohol consumer. Hep C RNA 75  Is not in liver failure.         Pneumonia of left lower lobe due to infectious " organism    As discussed above        Essential hypertension    All anti-HTN medications held for now.           VTE Risk Mitigation         Ordered     Medium Risk of VTE  Once      02/12/18 6516          Critical care time spent on the evaluation and treatment of severe organ dysfunction, review of pertinent labs and imaging studies, discussions with consulting providers and discussions with patient/family: 60 minutes.    Nancy Wilder MD  Department of Hospital Medicine   [facility]

## 2018-02-27 NOTE — PLAN OF CARE
Problem: Patient Care Overview  Goal: Plan of Care Review  Outcome: Ongoing (interventions implemented as appropriate)  VSS. Afebrile. No furth skin breakdown or injuries. Remains on vent, unable to be weaned. 60cc of urine output. 100 of flexi output. Family meeting today, they wish to give the patient until Wednesday and then have another family meeting.

## 2018-02-27 NOTE — HOSPITAL COURSE
2/27/18: o2 sats a bit better this morning.  Weaned FiO2 to 50, PEEP 14.  O2 Sat 94%  2/28/18: R4bal=83% on 50% and 14.

## 2018-02-27 NOTE — CONSULTS
"                                  Renal ICU Progress Note    Date of Admission: 2018  2:10 PM    Length of Stay:  15  Days    ROS:  N/a pte. On the vent.    Physical Exam:    Vitals:    18 0845 18 0900 18 0915 18 0930   BP:  (!) 104/57  (!) 106/59   Pulse: 104 104 105 104   Resp: (!) 28 (!) 31 (!) 36 (!) 28   Temp:       TempSrc:       SpO2: 99% 99% 98% 98%   Weight:       Height:           I/O last 3 completed shifts:  In: 4779.1 [I.V.:1044.1; NG/GT:3635; IV Piggyback:100]  Out: 680 [Urine:95; Drains:110; Stool:475]  I/O this shift:  In: 100 [NG/GT:100]  Out: 1801 [Urine:1; Drains:1800]        Laboratories:      Recent Labs  Lab 18  0200   WBC 14.75*   RBC 3.40*   HGB 8.9*   HCT 26.1*   PLT 74*   MCV 77*   MCH 26.2*   MCHC 34.1       Recent Labs  Lab 18  0200   CALCIUM 8.0*   PROT 6.2   *   K 5.2*   CO2 20*   CL 97   *   CREATININE 7.0*   ALKPHOS 100   ALT 24   AST 90*   BILITOT 0.7     Phosphorus      1.1      2.9     6.7  Phosphorus     Vancomycin, Random  32.5 21.9 20.1 50.1  Vancomycin, Random     Vancomycin-Trough  16.9            Iron      25             Iron      TIBC      283             TIBC     Saturated Iron      9             Saturated Iron     Transferrin      191             Transferrin     Ferritin      198             Ferritin     Folate       3.8            Folate     Vitamin B-12       238            Vitamin       Sodium Urine Random             <20 Ref Range: 20 - 250 mmol/L Lab: [facility] Comment:  The random urine reference ranges provided were established  for 24 hour urine collections. No reference ranges exist for random urine specimens. Correlate clinically. " style="paddinpx 1px 0px 0px; text-align: right;"><20<20 Ref Range: 20 - 250 mmol/L Lab: [facility] Comment:  The random urine reference ranges provided were established  for 24 hour urine collections. No reference " "ranges exist for random urine specimens. Correlate clinically. " style="border: currentColor; border-image: none; width: 5px; height: 10px;" src="[email or web address]">   Sodium Urine      Creatinine, Random Ur             128.9   Creatinine       Ledbetter's Stain, Ur             No eo...   Ledbetter's          Microbiology Results (last 7 days)     Procedure Component Value Units Date/Time    Blood culture [[id number]] Collected:  02/22/18 1643    Order Status:  Completed Specimen:  Blood from Peripheral, Left  Hand Updated:  02/26/18 2103     Blood Culture, Routine No Growth to date     Blood Culture, Routine No Growth to date     Blood Culture, Routine No Growth to date     Blood Culture, Routine No Growth to date     Blood Culture, Routine No Growth to date    Blood culture [[id number]] Collected:  02/22/18 1635    Order Status:  Completed Specimen:  Blood from Line, Central Left  Neck Updated:  02/26/18 2103     Blood Culture, Routine No Growth to date     Blood Culture, Routine No Growth to date     Blood Culture, Routine No Growth to date     Blood Culture, Routine No Growth to date     Blood Culture, Routine No Growth to date    Culture, VAP (BAL) [[id number]] Collected:  02/22/18 1023    Order Status:  Completed Specimen:  Sputum from BAL, RLL Updated:  02/24/18 0903     VAP BAL CULTURE Normal respiratory rosie    Narrative:       Respiratory perform    Blood culture [[id number]] Collected:  02/18/18 1215    Order Status:  Completed Specimen:  Blood Updated:  02/23/18 1503     Blood Culture, Routine No growth after 5 days.    Blood culture [[id number]] Collected:  02/18/18 1210    Order Status:  Completed Specimen:  Blood Updated:  02/23/18 1503     Blood Culture, Routine No growth after 5 days.    Urine culture [[id number]] Collected:  02/20/18 1345    Order Status:  Completed Specimen:  Urine from Urine, Catheterized Updated: " " 02/22/18 0836     Urine Culture, Routine No growth          Diagnostic Tests:    X-Ray Chest 1 View [[id number]] Resulted: 02/24/18 1253   Order Status: Completed Updated: 02/24/18 1253   Narrative:     XR CHEST 1 VIEW.  Clinical History provided for exam:"MV ". Her NG tube and right internal jugular central venous catheter remain.  ET tube has been retracted slightly and has its tip at T2. The cardiac silhouette is exaggerated by AP portable technique and probably only mildly enlarged.  Pulmonary vascularity is not increased.  The right lung is similar to 2/23/18 at 2025.  There has been interval progression of the abnormal opacification in the left mid and lower lung since the previous examination.  There is likely left pleural fluid as previously.  The right lateral costophrenic sulcus is not blunted.  There is no  pneumothorax.  Visualized osseous structures are stable.   Impression:      Interval increase in opacification in the left mid and lower lung since 2/23/18 at 2025.        Electronically signed by: NE HASSAN MD  Date: 02/24/18  Time: 12:53          Assessment:    - Pneumonia aspiration v.s. CAP  - Acute resp. Failure  - Septic shock? SIRS  - Oliguric YOLI due to above  - Vancomycin toxicity  - GIB  - Pancytopenia  - Hep C infection  - Alcohol abuse  - Hx. Schyzophrenia not responsive to treatment as per Family    Plan:      - Agree with Dr. Stephenson and Gala Guerra NP. pte. NOT a candidate for Dialysis due to history of "refractory" Schyzophrenia.  Comfort care advised.        "

## 2018-02-27 NOTE — PROGRESS NOTES
Pt received on Servo I vent with settings as followed: PRVC 28/430/+14 and 70%.  Size 8.0 ETT in place and secure at 24 cm at the lip.  Ambu bag and mask at bedside and all alarms on and functioning. NARN. RT will continue to monitor pt status.

## 2018-02-27 NOTE — NURSING
OG tube dislogded during bath time. Replaced OG tube. Patient tolerated replaced tube well. No acute distress noted. Verified by CXR. Will continue to monitor.

## 2018-02-27 NOTE — ASSESSMENT & PLAN NOTE
Intubated on 2.18.18 with acute hypoxemic respiratory failure 2/2 Multilobar PNA.   CXR a bit better today.  Decreased FiO2 to 50%, but PEEP remains at 14  · Continue low tidal volume lung protective ventilation per [facility] protocol.   · Continue antibiotics for treatment of multilobar pneumonia per ID recs.  · If renal failure does not improve, his current respiratory failure will be compounded by volume overload, however, based on his history I agree that he is a poor candidate for HD.

## 2018-02-27 NOTE — ASSESSMENT & PLAN NOTE
Patient's mother reports he is a heavy alcohol consumer and barely eats  Rapid HIV is negative. Hep C + with RNA of 75. Also folate, iron and B12 deficient. Combination of these can cause marrow failure  Hem/Onc  On board for co-management. Is s/p Granix for neutropenia x 1. Neutropenia resolved.  Iron given with RBCs when GI bleed was present. Also provided with platelets. No active bleeding at this time. Continue folic acid and B12 supplementation.

## 2018-02-27 NOTE — SUBJECTIVE & OBJECTIVE
Interval History:   · Persistent oligouria.  · Leukocytosis better today.    Objective:     Vital Signs (Most Recent):  Temp: 99.6 °F (37.6 °C) (02/27/18 0700)  Pulse: 104 (02/27/18 0930)  Resp: (!) 28 (02/27/18 0930)  BP: (!) 106/59 (02/27/18 0930)  SpO2: 98 % (02/27/18 0930) Vital Signs (24h Range):  Temp:  [98.3 °F (36.8 °C)-99.6 °F (37.6 °C)] 99.6 °F (37.6 °C)  Pulse:  [] 104  Resp:  [0-36] 28  SpO2:  [93 %-99 %] 98 %  BP: (101-141)/(55-67) 106/59     Weight: 111.1 kg (244 lb 14.9 oz)  Body mass index is 38.36 kg/m².      Intake/Output Summary (Last 24 hours) at 02/27/18 1017  Last data filed at 02/27/18 0601   Gross per 24 hour   Intake          2809.35 ml   Output              395 ml   Net          2414.35 ml       Physical Exam   Constitutional: He appears well-developed and well-nourished.   HENT:   Head: Normocephalic and atraumatic.   ETT in place, OG tube   Eyes: Pupils are equal, round, and reactive to light.   Neck: Normal range of motion. No tracheal deviation present.   Cardiovascular: Normal rate and regular rhythm.    No murmur heard.  Pulmonary/Chest: Effort normal. He has no wheezes. He has no rales.   Bilateral breath sounds   Abdominal: Soft. Bowel sounds are normal. He exhibits no distension. There is no tenderness.   Musculoskeletal: He exhibits edema.   Neurological:   Sedated on propofol and fentanyl during my exam.     Skin: Skin is warm and dry.   Nursing note and vitals reviewed.      Vents:  Vent Mode: PRVC (02/27/18 0725)  Ventilator Initiated: Yes (02/18/18 0916)  Set Rate: 28 bmp (02/27/18 0725)  Vt Set: 430 mL (02/27/18 0725)  PEEP/CPAP: 14 cmH20 (02/27/18 0725)  Oxygen Concentration (%): 70 (02/27/18 0930)  Peak Airway Pressure: 33.9 cmH2O (02/27/18 0725)  Total Ve: 10.6 mL (02/27/18 0725)  F/VT Ratio<105 (RSBI): (!) 54.69 (02/27/18 0725)    Lines/Drains/Airways     Central Venous Catheter Line                 Percutaneous Central Line Insertion/Assessment - triple lumen   02/22/18 0914 right internal jugular 5 days          Drain                 NG/OG Tube 02/18/18 0930 16 Fr. Center mouth 9 days         Urethral Catheter 02/17/18 1700 9 days         Rectal Tube 02/22/18 0325 rectal tube w/ balloon (indicate number of mLs) 5 days          Airway                 Airway - Non-Surgical 02/18/18 0914 Endotracheal Tube 9 days                Significant Labs:    CBC/Anemia Profile:    Recent Labs  Lab 02/26/18  0230 02/27/18  0200   WBC 22.29* 14.75*   HGB 8.9* 8.9*   HCT 26.7* 26.1*   PLT 75* 74*   MCV 78* 77*   RDW 20.0* 20.2*        Chemistries:    Recent Labs  Lab 02/25/18  1835 02/26/18  0230 02/27/18  0200    137 134*   K 4.6 4.9 5.2*    100 97   CO2 20* 20* 20*   * 142* 168*   CREATININE 5.6* 6.2* 7.0*   CALCIUM 7.5* 7.5* 8.0*   ALBUMIN 1.8* 1.8* 1.9*   PROT  --  6.1 6.2   BILITOT  --  0.5 0.7   ALKPHOS  --  81 100   ALT  --  25 24   AST  --  75* 90*   PHOS 6.9*  --   --          Significant Imaging:  CXR from 2/26 with bilateral opacities, perhaps a bit worse at the right base on today's film.  CXR 2/27: better aeration of the right base; some resolution of his densities.

## 2018-02-27 NOTE — SUBJECTIVE & OBJECTIVE
Interval History: renal function continues to decline. Very uremic. No UOP. Tube feeds held 2/2 high residuals. Stool output slowing. Still requiring high vent assistance.    Review of Systems   Unable to perform ROS: Intubated     Objective:     Vital Signs (Most Recent):  Temp: 98.9 °F (37.2 °C) (02/27/18 1115)  Pulse: 107 (02/27/18 1300)  Resp: (!) 30 (02/27/18 1300)  BP: 128/66 (02/27/18 1300)  SpO2: (!) 94 % (02/27/18 1300) Vital Signs (24h Range):  Temp:  [98.3 °F (36.8 °C)-99.6 °F (37.6 °C)] 98.9 °F (37.2 °C)  Pulse:  [100-114] 107  Resp:  [0-36] 30  SpO2:  [93 %-99 %] 94 %  BP: ()/(51-75) 128/66     Weight: 111.1 kg (244 lb 14.9 oz)  Body mass index is 38.36 kg/m².    Intake/Output Summary (Last 24 hours) at 02/27/18 1308  Last data filed at 02/27/18 1300   Gross per 24 hour   Intake          2812.42 ml   Output             2211 ml   Net           601.42 ml      Physical Exam   Constitutional: He appears well-developed.   Disheveled, unkept   HENT:   Head: Normocephalic and atraumatic.   ET tube in place   Eyes: Conjunctivae are normal. Right eye exhibits no discharge. Left eye exhibits no discharge.   Neck: Normal range of motion.   Cardiovascular: Normal rate and regular rhythm.  Exam reveals no gallop and no friction rub.    No murmur heard.  Pulmonary/Chest:   decreased breath sounds throughout with course sounds.    Abdominal: Soft. He exhibits no distension.   Obese, hypoactive bowel sounds   Musculoskeletal: Normal range of motion. He exhibits edema (+ 2 throughout ).   Neurological:   Sedated on vent   Skin: Skin is warm and dry. No erythema.   Burn marks on palmar aspect left thumb   Psychiatric: He has a normal mood and affect. His behavior is normal. Judgment and thought content normal.   Nursing note and vitals reviewed.      Significant Labs: All pertinent labs within the past 24 hours have been reviewed.    Significant Imaging: I have reviewed all pertinent imaging results/findings within  the past 24 hours.  I have reviewed and interpreted all pertinent imaging results/findings within the past 24 hours.

## 2018-02-27 NOTE — PLAN OF CARE
Problem: Patient Care Overview  Goal: Plan of Care Review  Outcome: Ongoing (interventions implemented as appropriate)  Patient in bed sedated on Propofol and Fentanyl. Patient unable to follow commands. Gag reflex intact. DNR status. SInus tachycardia on monitor. Afebrile. RIJ TLC. CVP monitoring. Tube feedings consist of Novosource Renal at 50ml/hr and 135mL of free H2O flushes q4hrs. OG tube intact. Tolerating tube feedings. Patient remains on ventilator. PRVC- rate of 28, TV of 430, 70% of FiO2, and 14 of Peep. Flexiseal intact. Martinez catheter intact. SCD's intact. Dermatitis to buttocks. Plan discussed with family.

## 2018-02-27 NOTE — EICU
Vent Day # 10     57 y/o M Hep C, septic shock secondary to multilobar pneumonia.  Intubated 2/18 after failed Bipap. Pancytopenia  Also with GIB.  Still high oxygen requirement sedated on propofol and fentanyl     2/26/2018 04:16 2/27/2018 04:30   POC PH 7.321 (L) 7.320 (L)   POC PCO2 39.3 36.8   POC PO2 76 (L) 92   POC BE -5 -7   POC HCO3 20.3 (L) 18.9 (L)   POC SATURATED O2 94 (L) 97   POC TCO2 22 (L) 20 (L)   FiO2 70 70   Vt 430 430   PiP 31 23   PEEP 14 14     · Improving gas exchange  · Decrease FiO2 60 and continue to wean FiO2 and PEEP based on [facility] vent protocol  · Fluid restrict

## 2018-02-27 NOTE — EICU
Tube feeds stopped and OGT advanced by 5cm to 75 at the lips ,repeat CXR ordered per nurse Xiomara RN

## 2018-02-27 NOTE — PROGRESS NOTES
[facility]  Pulmonology  Progress Note    Patient Name: Dwight Bolton  MRN: [id number]  Admission Date: 2/12/2018  Hospital Length of Stay: 15 days  Code Status: DNR  Attending Provider: Nancy Wilder MD  Primary Care Provider: Primary Doctor No   Principal Problem: Acute respiratory failure with hypoxia and hypercarbia    Subjective:     Interval History:   · Persistent oligouria.  · Leukocytosis better today.  · Palliative care met with family yesterday: plan to continue supportive care for a few more days, but would like to focus on comfort.  No heroic measures.    Objective:     Vital Signs (Most Recent):  Temp: 99.6 °F (37.6 °C) (02/27/18 0700)  Pulse: 104 (02/27/18 0930)  Resp: (!) 28 (02/27/18 0930)  BP: (!) 106/59 (02/27/18 0930)  SpO2: 98 % (02/27/18 0930) Vital Signs (24h Range):  Temp:  [98.3 °F (36.8 °C)-99.6 °F (37.6 °C)] 99.6 °F (37.6 °C)  Pulse:  [] 104  Resp:  [0-36] 28  SpO2:  [93 %-99 %] 98 %  BP: (101-141)/(55-67) 106/59     Weight: 111.1 kg (244 lb 14.9 oz)  Body mass index is 38.36 kg/m².      Intake/Output Summary (Last 24 hours) at 02/27/18 1017  Last data filed at 02/27/18 0601   Gross per 24 hour   Intake          2809.35 ml   Output              395 ml   Net          2414.35 ml       Physical Exam   Constitutional: He appears well-developed and well-nourished.   HENT:   Head: Normocephalic and atraumatic.   ETT in place, OG tube   Eyes: Pupils are equal, round, and reactive to light.   Neck: Normal range of motion. No tracheal deviation present.   Cardiovascular: Normal rate and regular rhythm.    No murmur heard.  Pulmonary/Chest: Effort normal. He has no wheezes. He has no rales.   Bilateral breath sounds   Abdominal: Soft. Bowel sounds are normal. He exhibits no distension. There is no tenderness.   Musculoskeletal: He exhibits edema.   Neurological:   Sedated on propofol and fentanyl during my exam.     Skin: Skin is warm and dry.   Nursing note and vitals  reviewed.      Vents:  Vent Mode: PRVC (02/27/18 0725)  Ventilator Initiated: Yes (02/18/18 0916)  Set Rate: 28 bmp (02/27/18 0725)  Vt Set: 430 mL (02/27/18 0725)  PEEP/CPAP: 14 cmH20 (02/27/18 0725)  Oxygen Concentration (%): 70 (02/27/18 0930)  Peak Airway Pressure: 33.9 cmH2O (02/27/18 0725)  Total Ve: 10.6 mL (02/27/18 0725)  F/VT Ratio<105 (RSBI): (!) 54.69 (02/27/18 0725)    Lines/Drains/Airways     Central Venous Catheter Line                 Percutaneous Central Line Insertion/Assessment - triple lumen  02/22/18 0914 right internal jugular 5 days          Drain                 NG/OG Tube 02/18/18 0930 16 Fr. Center mouth 9 days         Urethral Catheter 02/17/18 1700 9 days         Rectal Tube 02/22/18 0325 rectal tube w/ balloon (indicate number of mLs) 5 days          Airway                 Airway - Non-Surgical 02/18/18 0914 Endotracheal Tube 9 days                Significant Labs:    CBC/Anemia Profile:    Recent Labs  Lab 02/26/18  0230 02/27/18  0200   WBC 22.29* 14.75*   HGB 8.9* 8.9*   HCT 26.7* 26.1*   PLT 75* 74*   MCV 78* 77*   RDW 20.0* 20.2*        Chemistries:    Recent Labs  Lab 02/25/18  1835 02/26/18  0230 02/27/18  0200    137 134*   K 4.6 4.9 5.2*    100 97   CO2 20* 20* 20*   * 142* 168*   CREATININE 5.6* 6.2* 7.0*   CALCIUM 7.5* 7.5* 8.0*   ALBUMIN 1.8* 1.8* 1.9*   PROT  --  6.1 6.2   BILITOT  --  0.5 0.7   ALKPHOS  --  81 100   ALT  --  25 24   AST  --  75* 90*   PHOS 6.9*  --   --          Significant Imaging:  CXR from 2/26 with bilateral opacities, perhaps a bit worse at the right base on today's film.  CXR 2/27: better aeration of the right base; some resolution of his densities.     Assessment/Plan:     * Acute respiratory failure with hypoxia and hypercarbia    Intubated on 2.18.18 with acute hypoxemic respiratory failure 2/2 Multilobar PNA.   CXR a bit better today.  Decreased FiO2 to 50%, but PEEP remains at 14  · Continue low tidal volume lung protective  ventilation per [facility] protocol.   · Continue antibiotics for treatment of multilobar pneumonia per ID recs.  · Daily SAT to facilitate SBT when o2 requirements improve.  · If renal failure does not improve, his current respiratory failure will be compounded by volume overload, however, based on his history I agree that he is a poor candidate for HD.             On mechanically assisted ventilation    As above.        Acute renal failure    As above.            Critical Care Time: 35 minutes  Critical care was time spent personally by me on the following activities: evaluating this patient's organ dysfunction, development of treatment plan, discussing treatment plan with patient or surrogate and bedside caregivers, discussions with consultants, evaluation of patient's response to treatment, examination of patient, ordering and performing treatments and interventions, ordering and review of laboratory studies, ordering and review of radiographic studies, re-evaluation of patient's condition. This critical care time did not overlap with that of any other provider or involve time for any procedures.           Gala Liu MD  Pulmonology  [facility]

## 2018-02-27 NOTE — PLAN OF CARE
Problem: Patient Care Overview  Goal: Plan of Care Review  Patient received on servoi with ETT secured to right side of lip. All ventilator alarms on, set and functioning. ambu bag and mask at bedside. Aerosol treatment given as ordered. Will continue to monitor.

## 2018-02-27 NOTE — ASSESSMENT & PLAN NOTE
Likely due to ATN with hypoperfusion in setting of septic shock, now worsened by supratherapeutic vanc level. Shock resolved and is not on vasopressor support.  Hold IVFs as he is evidently volume overload. Failed trial of lasix. Although no hyperkalemia, patient is in renal failure. BUN >100 and climbing.   Nephrology on board for co-management. Not a candidate for long term HD  Strict I/Os.

## 2018-02-27 NOTE — ASSESSMENT & PLAN NOTE
Reported by patient's mother. Is unkept and lived in poor living conditions. Unknown medical regimen but I suspect he was not compliant.

## 2018-02-28 NOTE — ASSESSMENT & PLAN NOTE
Patient's mother reports he is a heavy alcohol consumer and barely eats  Rapid HIV is negative. Hep C + with RNA of 75. Also folate, iron and B12 deficient. Combination of these can cause marrow failure  Hem/Onc on board for co-management. Is s/p Granix for neutropenia x 1. Neutropenia resolved.  Iron given with RBCs when GI bleed was present. Also provided with platelets. No active bleeding at this time.   Placed on folic acid and B12 supplementation.

## 2018-02-28 NOTE — SUBJECTIVE & OBJECTIVE
Interval History: renal function continues to decline. Very uremic. No with blood in OG and ET tube.    Review of Systems   Unable to perform ROS: Intubated     Objective:     Vital Signs (Most Recent):  Temp: 97.5 °F (36.4 °C) (02/28/18 1100)  Pulse: 92 (02/28/18 1400)  Resp: (!) 30 (02/28/18 1400)  BP: (!) 116/59 (02/28/18 1400)  SpO2: (!) 94 % (02/28/18 1400) Vital Signs (24h Range):  Temp:  [97.3 °F (36.3 °C)-98.4 °F (36.9 °C)] 97.5 °F (36.4 °C)  Pulse:  [] 92  Resp:  [16-63] 30  SpO2:  [79 %-98 %] 94 %  BP: (100-174)/(51-86) 116/59     Weight: 111.1 kg (244 lb 14.9 oz)  Body mass index is 38.36 kg/m².    Intake/Output Summary (Last 24 hours) at 02/28/18 1513  Last data filed at 02/28/18 1200   Gross per 24 hour   Intake           947.76 ml   Output              274 ml   Net           673.76 ml      Physical Exam   Constitutional: He appears well-developed.   Disheveled, unkept   HENT:   Head: Normocephalic and atraumatic.   ET tube in place   Eyes: Conjunctivae are normal. Right eye exhibits no discharge. Left eye exhibits no discharge.   Neck: Normal range of motion.   Cardiovascular: Normal rate and regular rhythm.  Exam reveals no gallop and no friction rub.    No murmur heard.  Pulmonary/Chest:   decreased breath sounds, coarse, thick secretions   Abdominal: Soft. He exhibits no distension.   Obese, hypoactive bowel sounds   Musculoskeletal: Normal range of motion. He exhibits edema (+ 2 throughout ).   Neurological:   Sedated on vent   Skin: Skin is warm and dry. No erythema.   Burn marks on palmar aspect left thumb   Psychiatric: He has a normal mood and affect. His behavior is normal. Judgment and thought content normal.   Nursing note and vitals reviewed.      Significant Labs: All pertinent labs within the past 24 hours have been reviewed.    Significant Imaging: I have reviewed all pertinent imaging results/findings within the past 24 hours.  I have reviewed and interpreted all pertinent imaging  results/findings within the past 24 hours.

## 2018-02-28 NOTE — DISCHARGE SUMMARY
"[facility] Hospital Medicine  Discharge Summary      Patient Name: Dwight Bolton  MRN: [id number]  Admission Date: 2/12/2018  Hospital Length of Stay: 16 days  Discharge Date and Time: 2/28/2018 at 1548  Attending Physician: Nancy Wilder MD   Discharging Provider: Nancy Wilder MD  Primary Care Provider: Primary Doctor No      HPI:   Mr. Dwight Bolton is a 57 y.o. male with essential hypertension who presents to [facility] ED with complaints of fall today.  He has been feeling weak "for a while" and is always short of breath but says that he came here because he had a fall today.  He denies any loss of consciousness, lightheadedness, nor did he sustain any head trauma.  He denies any antecedent chest pain or palpitations, but he does say that he is always short of breath and it's not any worse than usual.  He denies any fever, chills, nausea, vomiting, nor headaches.  He decided to come here when he couldn't stand back up.  He is otherwise a very poor historian.    Hospital Course:   Mr. Bolton presented with severe sepsis likely secondary to pneumonia. CXR showed LLL consolidation. Given no recent hospitalization for previous 3 months, he was initiated on ceftriaxone and azithromycin, plus supplemental O2. Workup significant for Hep C and pancytopenia with neutropenia. Retic count low, therefore Hem/Onc consulted for co-management. ID also consulted for abx co-management given immunocompromised state. Patient continued to decline despite antimicrobial regimen and supplemental O2. Placed on BiPAP. Transferred to the ICU on 2/17 for continued respiratory decompensation on BIPAP. On 2/18, required emergent intubation and very high O2 requirements. A rectal tube was placed and blood seen post placement. Transfused a total of 4U of PRBCs and 2 doses of platelets for acute blood loss anemia in setting of thrombocytopenia and GI bleed. GI consulted. Recommended conservative management. GI bleed " resolved with platelet transfusion. Pancytopenia/neutropenia co-managed by hematology. Provided vit B12, folic acid and filgrastim with eventual resolution of pancytopenia/neutropenia. Diuresed without improvement. Resp Cx and BCx from 2/17 negative. A CT of chest showed multiple areas of consolidation in the right upper lobes as well as the lower lobes bilateral concerning for multifocal pneumonia. Abx broadened to meropenem and vancomycin. Fluconazole also added in case fungal infection. Renal function declined significantly until failure. Nephrology consulted. Deemed not a candidate for dialysis. Persistently with maximal vent support (FiO2 60-70%, high PEEP) since intubation (2/18). Met with mother, daughter and two sons. Discussed poor prognosis despite life support. Agreed with DNR. Palliative care consulted to discuss withdrawal of life saving interventions. Family meeting 2/28 with terminal extubation ~1400. Comfort measures in place. Time of death 1548 on 2/28/2018 from respiratory failure from severe sepsis 2/2 pneumonia and renal insufficiency.       Consults         Status Ordering Provider     [facility] PharmD Consult  Once     Provider:  (Not yet assigned)    Acknowledged EMERSON GARDNER     Inpatient consult to Gastroenterology  Once     Provider:  Frandy Capone MD    Acknowledged RASHIDA NUNEZ     Inpatient consult to Hematology/Oncology - [facility]  Once     Provider:  Odalis Salas MD    Completed KAPIL GARCIA     Inpatient consult to Infectious Diseases  Once     Provider:  Sinan Gomez DO    Completed MILES BUTLER     Inpatient consult to Nephrology  Once     Provider:  Good Calvin MD    Acknowledged ALBAN PINEDA     Inpatient consult to Palliative Care  Once     Provider:  Jaqueline Guerra RN    Completed ALBAN PINEDA     Inpatient consult to Pulmonology  Once     Provider:  Ivan Esquivel MD    Completed MILES BUTLER     Inpatient  consult to Registered Dietitian/Nutritionist  Once     Provider:  (Not yet assigned)    Completed JASPER CARR     Inpatient consult to Spiritual Care  Once     Provider:  (Not yet assigned)    Acknowledged ALBAN PINEDA     IP consult to case management  Once     Provider:  (Not yet assigned)    Completed MILES BUTLER     IP consult to dietary  Once     Provider:  (Not yet assigned)    Completed RASHIDA NUNEZ          * Acute respiratory failure with hypoxia and hypercarbia    Pt with multilobar pneumonia, 2/2 chronic aspiration vs atypical bacterial vs fungal. Worsened despite diuresis, ventilator support and broad spectrum abx. Given immunocompromised  (neutropenic) state on admission, fungal infection is considered. Cultures have not revealed an organism. Considered bronch, however patient is not stable enough for this and still thrombocytopenia. Doubt patchy infiltrations (seen on CT) and fever are due to TRALI (had multiple blood transfusions). Hem added steroids in case it was TRALI, but now discontinued as this is unlikely. Continue antifungal therapy. Held vanc due to supratherapeutic level (> 40) and is now in renal failure. Vanc initially continued instead of using linezolid due to concerns for thrombocytopenia. Has been on very high O2 requirements since intubation on 2/18. ID and pulm on board for co-management.         Aspiration pneumonia    Initially treated for CAP. Patient is a heavy alcohol drinker  Likely to be aspiration pneumonia complicated by immunocompromised state. Fungal infx?  Antibiotics broadened recently by ID. Antifungals added. New resp Cx sent to lab showed normal resp rosie.         Acute renal failure    Likely due to ATN with hypoperfusion in setting of septic shock, now worsened by supratherapeutic vanc level. Shock resolved and is not on vasopressor support.  Hold IVFs as he is evidently volume overload. Failed trial of lasix. Although no hyperkalemia,  patient is in renal failure. BUN >100 and climbing.   Nephrology on board for co-management. Not a candidate for long term HD  UOP scant for the past few days.        Acquired pancytopenia    Patient's mother reports he is a heavy alcohol consumer and barely eats  Rapid HIV is negative. Hep C + with RNA of 75. Also folate, iron and B12 deficient. Combination of these can cause marrow failure  Hem/Onc on board for co-management. Is s/p Granix for neutropenia x 1. Neutropenia resolved.  Iron given with RBCs when GI bleed was present. Also provided with platelets. No active bleeding at this time.   Placed on folic acid and B12 supplementation.          Final Active Diagnoses:    Diagnosis Date Noted POA    PRINCIPAL PROBLEM:  Acute respiratory failure with hypoxia and hypercarbia [J96.01, J96.02] 2018 Yes    Aspiration pneumonia [J69.0] 2018 Yes    Acute renal failure [N17.9] 2018 Yes    Acquired pancytopenia [D61.818] 2018 Yes    On mechanically assisted ventilation [Z99.11] 2018 Not Applicable    Alcohol dependence, continuous [F10.20] 2018 Yes     Chronic    Other neutropenia [D70.8] 2018 Yes    Other schizophrenia [F20.89] 2018 Yes     Chronic    Malnutrition of moderate degree [E44.0] 2018 Yes     Chronic    Chronic hepatitis C virus infection [B18.2] 02/15/2018 Yes    Essential hypertension [I10] 2018 Yes     Chronic      Problems Resolved During this Admission:    Diagnosis Date Noted Date Resolved POA    GIB (gastrointestinal bleeding) [K92.2] 2018 Yes    Septic shock [A41.9, R65.21] 2018 Yes    Hypokalemia [E87.6] 2018 Yes       Discharged Condition:       Time spent on the discharge of patient: 45 minutes  Patient was seen and examined on the date of discharge and determined to be suitable for discharge.    Critical care time spent on the evaluation and treatment of severe organ  dysfunction, review of pertinent labs and imaging studies, discussions with consulting providers and discussions with patient/family: 45 minutes.     Nancy Wilder MD  Department of Hospital Medicine  [facility]

## 2018-02-28 NOTE — ASSESSMENT & PLAN NOTE
Likely due to ATN with hypoperfusion in setting of septic shock, now worsened by supratherapeutic vanc level. Shock resolved and is not on vasopressor support.  Hold IVFs as he is evidently volume overload. Failed trial of lasix. Although no hyperkalemia, patient is in renal failure. BUN >100 and climbing.   Nephrology on board for co-management. Not a candidate for long term HD  UOP scant for the past few days

## 2018-02-28 NOTE — UM SECONDARY REVIEW
[facility]    IP Extended Stay > 10    LOS: approved w/o recommendations due to severity of clinical picture   16 DAY LOS RESP FAILURE, INTUBATED ON VENT LOS APPROVED PER [facility]/ CRITERIA

## 2018-02-28 NOTE — PLAN OF CARE
18 1607   Final Note   Assessment Type Final Discharge Note   Discharge Disposition    Dr Wilder notified SW when patient . SW offered patient mother condolences and assisted mother by contacting Aris Adame  for patient at [facility]) [phone] per voice mail.

## 2018-02-28 NOTE — PROGRESS NOTES
[facility]  Pulmonology  Progress Note    Patient Name: Dwight Bolton  MRN: [id number]  Admission Date: 2/12/2018  Hospital Length of Stay: 16 days  Code Status: DNR  Attending Provider: Nancy Wilder MD  Primary Care Provider: Primary Doctor No   Principal Problem: Acute respiratory failure with hypoxia and hypercarbia    Subjective:     Interval History:   · Minimal urine output despite lasix      Objective:     Vital Signs (Most Recent):  Temp: 97.5 °F (36.4 °C) (02/28/18 0715)  Pulse: 99 (02/28/18 1140)  Resp: (!) 47 (02/28/18 1140)  BP: (!) 116/57 (02/28/18 0830)  SpO2: (!) 94 % (02/28/18 1140) Vital Signs (24h Range):  Temp:  [97.3 °F (36.3 °C)-98.4 °F (36.9 °C)] 97.5 °F (36.4 °C)  Pulse:  [] 99  Resp:  [16-63] 47  SpO2:  [79 %-98 %] 94 %  BP: (100-174)/(51-86) 116/57     Weight: 111.1 kg (244 lb 14.9 oz)  Body mass index is 38.36 kg/m².      Intake/Output Summary (Last 24 hours) at 02/28/18 1205  Last data filed at 02/28/18 0900   Gross per 24 hour   Intake           446.46 ml   Output              209 ml   Net           237.46 ml       Physical Exam   Constitutional: He appears well-developed and well-nourished.   HENT:   Head: Normocephalic and atraumatic.   ETT in place, OG tube  Bleeding noted from oral cavity today.   Eyes: Pupils are equal, round, and reactive to light.   Neck: Normal range of motion. No tracheal deviation present.   Cardiovascular: Normal rate and regular rhythm.    No murmur heard.  Pulmonary/Chest: Effort normal. He has no wheezes. He has no rales.   Bilateral breath sounds   Abdominal: Soft. He exhibits no distension. There is no tenderness.   Hypoactive bowel sounds.   Musculoskeletal: He exhibits edema.   Neurological:   On minimal sedation he may open eyes, but does not follow commands.     Skin: Skin is warm and dry.   Nursing note and vitals reviewed.      Vents:  Vent Mode: PRVC (02/28/18 1140)  Ventilator Initiated: Yes (02/18/18 0626)  Set Rate: 28  bmp (02/28/18 1140)  Vt Set: 430 mL (02/28/18 1140)  PEEP/CPAP: 14 cmH20 (02/28/18 1140)  Oxygen Concentration (%): 50 (02/28/18 1140)  Peak Airway Pressure: 20.1 cmH2O (02/28/18 1140)  Total Ve: 9.3 mL (02/28/18 1140)  F/VT Ratio<105 (RSBI): 115.48 (02/28/18 1140)    Lines/Drains/Airways     Central Venous Catheter Line                 Percutaneous Central Line Insertion/Assessment - triple lumen  02/22/18 0914 right internal jugular 6 days          Drain                 NG/OG Tube 02/18/18 0930 16 Fr. Center mouth 10 days         Urethral Catheter 02/17/18 1700 10 days          Airway                 Airway - Non-Surgical 02/18/18 0914 Endotracheal Tube 10 days                Significant Labs:    CBC/Anemia Profile:    Recent Labs  Lab 02/27/18  0200 02/28/18  0202   WBC 14.75* 13.53*   HGB 8.9* 8.7*   HCT 26.1* 25.2*   PLT 74* 67*   MCV 77* 76*   RDW 20.2* 20.6*        Chemistries:    Recent Labs  Lab 02/27/18  0200 02/28/18  0202 02/28/18  0850   * 132* 127*   K 5.2* 7.2* 7.0*   CL 97 95 93*   CO2 20* 16* 16*   * 186* 186*   CREATININE 7.0* 7.6* 7.7*   CALCIUM 8.0* 7.8* 7.7*   ALBUMIN 1.9* 1.8*  --    PROT 6.2 6.3  --    BILITOT 0.7 1.2*  --    ALKPHOS 100 97  --    ALT 24 22  --    AST 90* 160*  --          Significant Imaging:  CXR from 2/26 with bilateral opacities, perhaps a bit worse at the right base on today's film.  CXR 2/27: better aeration of the right base; some resolution of his densities.     Assessment/Plan:     * Acute respiratory failure with hypoxia and hypercarbia    Intubated on 2.18.18 with acute hypoxemic respiratory failure 2/2 Multilobar PNA.    FiO2 =50%, PEEP = 14 with O2 Sats>90%  · Continue low tidal volume lung protective ventilation per [employer] protocol.   · Continue antibiotics for treatment of multilobar pneumonia per ID recs.  · Minimal progress weaning from the ventilator over the course of the last week.  · His profound renal failure is contributing to his  respiratory failure.  · Volume overload  · Worsening hyperkalemia  · Poor candidate for HD          Acute renal failure    As above.            Critical Care Time: 30 minutes  Critical care was time spent personally by me on the following activities: evaluating this patient's organ dysfunction, development of treatment plan, discussing treatment plan with patient or surrogate and bedside caregivers, discussions with consultants, evaluation of patient's response to treatment, examination of patient, ordering and performing treatments and interventions, ordering and review of laboratory studies, ordering and review of radiographic studies, re-evaluation of patient's condition. This critical care time did not overlap with that of any other provider or involve time for any procedures.           Gala Liu MD  Pulmonology  [facility]

## 2018-02-28 NOTE — PROGRESS NOTES
PALLIATIVE CARE PROGRESS NOTE:    Bedside visit, family gathered in horn.  Patient was withdrawn from life support a short time ago.  Patient looks comfortable on Fentanyl gtt and prn Ativan/Fentanyl pushes.  Sonorous respirations noted.  Emotional support to family.      Jaqueline Guerra, BSN, RN, CCRN, PN   Palliative Care Nurse Coordinator   [facility]  [phone]

## 2018-02-28 NOTE — PROGRESS NOTES
PALLIATIVE CARE PROGRESS NOTE:    Met with patient's mother Alberta and daughter Ryann at the bedside along with Dr. Wilder who gave them status update regarding progressive decline with increasing renal failure, respiratory failure, elevated potassium and now having some bleeding concerning for onset of DIC.  Family verbalized good understanding and desire for comfort measures.    Subsequent to above, I met with his mother, daughter, and son Dwight (Participated by speaker phone) and we discussed withdrawal of life support protocol and process.  Explained comfort measures will be maintained as they do not want patient to suffer.  We also discussed that patient may pass within a few minutes, or it could be hours or even days - though that is highly unlikely.  We discussed hospice if needed and their first choice is [facility] because his mother works in the [place] area. They are also aware of inpatient facility at [facility].  Notified INESSA Chaney of family choice.      We reviewed code status and they reaffirm DNR.  We reviewed the [place]POST document in detail, daughter signed; it was then given to INESSA Chaney.  All are in agreement for DNR and comfort.     All questions were asked and answered to their satisfaction.  Ample time allowed for discussion of concerns and feelings.  Emotional support provided.     Subsequent to above, reviewed order set with Dr. Wilder.  Discussed with bedside RNs Mattie and Viviana ESTRADA.     Plan:      Withdrawal of life support per protocol today.   Comfort measures.   [facility] if needed (highly unlikely).   DNR.   LaPOST document done.   Palliative Care will follow as needed.    Jaqueline Guerra, DEEPAKN, RN, CCRN, CHPN   Palliative Care Nurse Coordinator   [facility]  [phone]

## 2018-02-28 NOTE — CONSULTS
"                                  Renal ICU Progress Note    Date of Admission: 2018  2:10 PM    Length of Stay:  16  Days    ROS:  N/a pte. On the vent.    Physical Exam:    Vitals:    18 1323 18 1330 18 1345 18 1400   BP:  127/60  (!) 116/59   Pulse: 89 91 90 92   Resp: (!) 29 (!) 44 (!) 26 (!) 30   Temp:       TempSrc:       SpO2: (!) 93% (!) 93% (!) 93% (!) 94%   Weight:       Height:           I/O last 3 completed shifts:  In: 2717.9 [I.V.:862.9; NG/GT:1755; IV Piggyback:100]  Out: 2184 [Urine:174; Drains:1910; Stool:100]  I/O this shift:  In: 554.5 [I.V.:554.5]  Out: 165 [Urine:165]        Laboratories:      Recent Labs  Lab 18  0202   WBC 13.53*   RBC 3.31*   HGB 8.7*   HCT 25.2*   PLT 67*   MCV 76*   MCH 26.3*   MCHC 34.5       Recent Labs  Lab 18  0202 18  0850   CALCIUM 7.8* 7.7*   PROT 6.3  --    * 127*   K 7.2* 7.0*   CO2 16* 16*   CL 95 93*   * 186*   CREATININE 7.6* 7.7*   ALKPHOS 97  --    ALT 22  --    *  --    BILITOT 1.2*  --      Phosphorus      1.1      2.9     6.7  Phosphorus     Vancomycin, Random  32.5 21.9 20.1 50.1  Vancomycin, Random     Vancomycin-Trough  16.9            Iron      25             Iron      TIBC      283             TIBC     Saturated Iron      9             Saturated Iron     Transferrin      191             Transferrin     Ferritin      198             Ferritin     Folate       3.8            Folate     Vitamin B-12       238            Vitamin       Sodium Urine Random             <20 Ref Range: 20 - 250 mmol/L Lab: [facility] Comment:  The random urine reference ranges provided were established  for 24 hour urine collections. No reference ranges exist for random urine specimens. Correlate clinically. " style="paddinpx 1px 0px 0px; text-align: right;"><20<20 Ref Range: 20 - 250 mmol/L Lab: [facility] Comment:  The random urine reference ranges " "provided were established  for 24 hour urine collections. No reference ranges exist for random urine specimens. Correlate clinically. " style="border: currentColor; border-image: none; width: 5px; height: 10px;" src="[email or web address]">   Sodium Urine      Creatinine, Random Ur             128.9   Creatinine       Ledbetter's Stain, Ur             No eo...   Ledbetter's          Microbiology Results (last 7 days)     Procedure Component Value Units Date/Time    Blood culture [[id number]] Collected:  02/22/18 1643    Order Status:  Completed Specimen:  Blood from Peripheral, Left  Hand Updated:  02/27/18 2103     Blood Culture, Routine No growth after 5 days.    Blood culture [[id number]] Collected:  02/22/18 1635    Order Status:  Completed Specimen:  Blood from Line, Central Left  Neck Updated:  02/27/18 2103     Blood Culture, Routine No growth after 5 days.    AFB Culture & Smear [[id number]] Collected:  02/13/18 1130    Order Status:  Completed Specimen:  Respiratory from Sputum, Induced Updated:  02/27/18 1522     AFB Culture & Smear Culture in progress     AFB CULTURE STAIN No acid fast bacilli seen.    Culture, VAP (BAL) [[id number]] Collected:  02/22/18 1023    Order Status:  Completed Specimen:  Sputum from BAL, RLL Updated:  02/24/18 0903     VAP BAL CULTURE Normal respiratory rosie    Narrative:       Respiratory perform    Blood culture [[id number]] Collected:  02/18/18 1215    Order Status:  Completed Specimen:  Blood Updated:  02/23/18 1503     Blood Culture, Routine No growth after 5 days.    Blood culture [[id number]] Collected:  02/18/18 1210    Order Status:  Completed Specimen:  Blood Updated:  02/23/18 1503     Blood Culture, Routine No growth after 5 days.    Urine culture [[id number]] Collected:  02/20/18 1345    Order Status:  Completed Specimen:  Urine from Urine, Catheterized Updated:  02/22/18 0836     Urine " "Culture, Routine No growth          Diagnostic Tests:    X-Ray Chest 1 View [[id number]] Resulted: 02/24/18 1253   Order Status: Completed Updated: 02/24/18 1253   Narrative:     XR CHEST 1 VIEW.  Clinical History provided for exam:"MV ". Her NG tube and right internal jugular central venous catheter remain.  ET tube has been retracted slightly and has its tip at T2. The cardiac silhouette is exaggerated by AP portable technique and probably only mildly enlarged.  Pulmonary vascularity is not increased.  The right lung is similar to 2/23/18 at 2025.  There has been interval progression of the abnormal opacification in the left mid and lower lung since the previous examination.  There is likely left pleural fluid as previously.  The right lateral costophrenic sulcus is not blunted.  There is no  pneumothorax.  Visualized osseous structures are stable.   Impression:      Interval increase in opacification in the left mid and lower lung since 2/23/18 at 2025.        Electronically signed by: NE HASSAN MD  Date: 02/24/18  Time: 12:53          Assessment:    - Pneumonia aspiration v.s. CAP  - Acute resp. Failure  - Septic shock? SIRS  - Oliguric YOLI due to above  - Vancomycin toxicity  - GIB  - Pancytopenia  - Hep C infection  - Alcohol abuse  - Hx. Schyzophrenia not responsive to treatment as per Family    Plan:    - Life support withdrawn  - Comfort measures in place    Will sign off the case    "

## 2018-02-28 NOTE — CARE UPDATE
Met with Mr. Bolton's family along with palliative care at the bedside. Plan for terminal extubation today ~1400. Comfort measures in place.

## 2018-02-28 NOTE — SUBJECTIVE & OBJECTIVE
Interval History:   · Minimal urine output despite lasix      Objective:     Vital Signs (Most Recent):  Temp: 97.5 °F (36.4 °C) (02/28/18 0715)  Pulse: 99 (02/28/18 1140)  Resp: (!) 47 (02/28/18 1140)  BP: (!) 116/57 (02/28/18 0830)  SpO2: (!) 94 % (02/28/18 1140) Vital Signs (24h Range):  Temp:  [97.3 °F (36.3 °C)-98.4 °F (36.9 °C)] 97.5 °F (36.4 °C)  Pulse:  [] 99  Resp:  [16-63] 47  SpO2:  [79 %-98 %] 94 %  BP: (100-174)/(51-86) 116/57     Weight: 111.1 kg (244 lb 14.9 oz)  Body mass index is 38.36 kg/m².      Intake/Output Summary (Last 24 hours) at 02/28/18 1205  Last data filed at 02/28/18 0900   Gross per 24 hour   Intake           446.46 ml   Output              209 ml   Net           237.46 ml       Physical Exam   Constitutional: He appears well-developed and well-nourished.   HENT:   Head: Normocephalic and atraumatic.   ETT in place, OG tube  Bleeding noted from oral cavity today.   Eyes: Pupils are equal, round, and reactive to light.   Neck: Normal range of motion. No tracheal deviation present.   Cardiovascular: Normal rate and regular rhythm.    No murmur heard.  Pulmonary/Chest: Effort normal. He has no wheezes. He has no rales.   Bilateral breath sounds   Abdominal: Soft. He exhibits no distension. There is no tenderness.   Hypoactive bowel sounds.   Musculoskeletal: He exhibits edema.   Neurological:   On minimal sedation he may open eyes, but does not follow commands.     Skin: Skin is warm and dry.   Nursing note and vitals reviewed.      Vents:  Vent Mode: PRVC (02/28/18 1140)  Ventilator Initiated: Yes (02/18/18 0916)  Set Rate: 28 bmp (02/28/18 1140)  Vt Set: 430 mL (02/28/18 1140)  PEEP/CPAP: 14 cmH20 (02/28/18 1140)  Oxygen Concentration (%): 50 (02/28/18 1140)  Peak Airway Pressure: 20.1 cmH2O (02/28/18 1140)  Total Ve: 9.3 mL (02/28/18 1140)  F/VT Ratio<105 (RSBI): 115.48 (02/28/18 1140)    Lines/Drains/Airways     Central Venous Catheter Line                 Percutaneous Central  Line Insertion/Assessment - triple lumen  02/22/18 0914 right internal jugular 6 days          Drain                 NG/OG Tube 02/18/18 0930 16 Fr. Center mouth 10 days         Urethral Catheter 02/17/18 1700 10 days          Airway                 Airway - Non-Surgical 02/18/18 0914 Endotracheal Tube 10 days                Significant Labs:    CBC/Anemia Profile:    Recent Labs  Lab 02/27/18  0200 02/28/18  0202   WBC 14.75* 13.53*   HGB 8.9* 8.7*   HCT 26.1* 25.2*   PLT 74* 67*   MCV 77* 76*   RDW 20.2* 20.6*        Chemistries:    Recent Labs  Lab 02/27/18  0200 02/28/18  0202 02/28/18  0850   * 132* 127*   K 5.2* 7.2* 7.0*   CL 97 95 93*   CO2 20* 16* 16*   * 186* 186*   CREATININE 7.0* 7.6* 7.7*   CALCIUM 8.0* 7.8* 7.7*   ALBUMIN 1.9* 1.8*  --    PROT 6.2 6.3  --    BILITOT 0.7 1.2*  --    ALKPHOS 100 97  --    ALT 24 22  --    AST 90* 160*  --          Significant Imaging:  CXR from 2/26 with bilateral opacities, perhaps a bit worse at the right base on today's film.  CXR 2/27: better aeration of the right base; some resolution of his densities.

## 2018-02-28 NOTE — NURSING
1800 total gastric residual noted from OGT. Tube clamped, MD notified. Will hold TF until tomorrow or until otherwise noted by MD. Will check blood glucose later today per MD.

## 2018-02-28 NOTE — ASSESSMENT & PLAN NOTE
Likely due to ATN with hypoperfusion in setting of septic shock, now worsened by supratherapeutic vanc level. Shock resolved and is not on vasopressor support.  Hold IVFs as he is evidently volume overload. Failed trial of lasix. Although no hyperkalemia, patient is in renal failure. BUN >100 and climbing.   Nephrology on board for co-management. Not a candidate for long term HD  UOP scant for the past few days.

## 2018-02-28 NOTE — PLAN OF CARE
Problem: Patient Care Overview  Goal: Plan of Care Review  Patient continues to be on the ventilator PRVC with Fio2 at 50, rate 28,  and peep 14. Propofol at 15mcg and  Fentanyl infusing at 12.5mcg/hr.Patient opens eyes with movement but does not follow commands or move arms or legs. K+ 7.2 this am and MD notified and D10 0.2%NS with 10u insulin IVPB given as ordered. Martinez with only 200cc out this shift. Suctioned some bloody secretions from mouth and throat. Gag reflex noted only with deep suction. Complete bath given. Last . Accuchecks q6hr.

## 2018-02-28 NOTE — PROGRESS NOTES
"[facility] Hospital Medicine  Progress Note    Patient Name: Dwight Bolton  MRN: [id number]  Patient Class: IP- Inpatient   Admission Date: 2/12/2018  Length of Stay: 16 days  Attending Physician: Nancy Wilder MD  Primary Care Provider: Primary Doctor No        Subjective:     Principal Problem:Acute respiratory failure with hypoxia and hypercarbia    HPI:  Mr. Dwight Bolton is a 57 y.o. male with essential hypertension who presents to [facility] ED with complaints of fall today.  He has been feeling weak "for a while" and is always short of breath but says that he came here because he had a fall today.  He denies any loss of consciousness, lightheadedness, nor did he sustain any head trauma.  He denies any antecedent chest pain or palpitations, but he does say that he is always short of breath and it's not any worse than usual.  He denies any fever, chills, nausea, vomiting, nor headaches.  He decided to come here when he couldn't stand back up.  He is otherwise a very poor historian.    Hospital Course:  Mr. Bolton presented with severe sepsis likely secondary to pneumonia. CXR showed LLL consolidation. Given no recent hospitalization for previous 3 months, he was initiated on ceftriaxone and azithromycin, plus supplemental O2. Workup significant for Hep C and pancytopenia with neutropenia. Retic count low, therefore Hem/Onc consulted for co-management. ID also consulted for abx co-management given immunocompromised state. Patient continued to decline despite antimicrobial regimen and supplemental O2. Placed on BiPAP. Transferred to the ICU on 2/17 for continued respiratory decompensation on BIPAP. On 2/18, required emergent intubation and very high O2 requirements. A rectal tube was placed and blood seen post placement. Transfused a total of 4U of PRBCs and 2 doses of platelets for acute blood loss anemia in setting of thrombocytopenia and GI bleed. GI consulted. Recommended conservative " management. GI bleed resolved with platelet transfusion. Pancytopenia/neutropenia co-managed by hematology. Provided vit B12, folic acid and filgrastim with eventual resolution of pancytopenia/neutropenia. Diuresed without improvement. Resp Cx and BCx from 2/17 negative. A CT of chest showed multiple areas of consolidation in the right upper lobes as well as the lower lobes bilateral concerning for multifocal pneumonia. Abx broadened to meropenem and vancomycin. Fluconazole also added in case fungal infection. Renal function declined significantly until failure. Nephrology consulted. Deemed not a candidate for dialysis. Persistently with maximal vent support (FiO2 60-70%, high PEEP) since intubation (2/18). Met with mother, daughter and two sons. Discussed poor prognosis despite life support. Agreed with DNR. Palliative care consulted to discuss withdrawal of life saving interventions. Family meeting today with terminal extubation ~1400. Comfort measures in place.    Interval History: renal function continues to decline. Very uremic. No with blood in OG and ET tube.    Review of Systems   Unable to perform ROS: Intubated     Objective:     Vital Signs (Most Recent):  Temp: 97.5 °F (36.4 °C) (02/28/18 1100)  Pulse: 92 (02/28/18 1400)  Resp: (!) 30 (02/28/18 1400)  BP: (!) 116/59 (02/28/18 1400)  SpO2: (!) 94 % (02/28/18 1400) Vital Signs (24h Range):  Temp:  [97.3 °F (36.3 °C)-98.4 °F (36.9 °C)] 97.5 °F (36.4 °C)  Pulse:  [] 92  Resp:  [16-63] 30  SpO2:  [79 %-98 %] 94 %  BP: (100-174)/(51-86) 116/59     Weight: 111.1 kg (244 lb 14.9 oz)  Body mass index is 38.36 kg/m².    Intake/Output Summary (Last 24 hours) at 02/28/18 1513  Last data filed at 02/28/18 1200   Gross per 24 hour   Intake           947.76 ml   Output              274 ml   Net           673.76 ml      Physical Exam   Constitutional: He appears well-developed.   Disheveled, unkept   HENT:   Head: Normocephalic and atraumatic.   ET tube in place    Eyes: Conjunctivae are normal. Right eye exhibits no discharge. Left eye exhibits no discharge.   Neck: Normal range of motion.   Cardiovascular: Normal rate and regular rhythm.  Exam reveals no gallop and no friction rub.    No murmur heard.  Pulmonary/Chest:   decreased breath sounds, coarse, thick secretions   Abdominal: Soft. He exhibits no distension.   Obese, hypoactive bowel sounds   Musculoskeletal: Normal range of motion. He exhibits edema (+ 2 throughout ).   Neurological:   Sedated on vent   Skin: Skin is warm and dry. No erythema.   Burn marks on palmar aspect left thumb   Psychiatric: He has a normal mood and affect. His behavior is normal. Judgment and thought content normal.   Nursing note and vitals reviewed.      Significant Labs: All pertinent labs within the past 24 hours have been reviewed.    Significant Imaging: I have reviewed all pertinent imaging results/findings within the past 24 hours.  I have reviewed and interpreted all pertinent imaging results/findings within the past 24 hours.    Assessment/Plan:      * Acute respiratory failure with hypoxia and hypercarbia    Pt with multilobar pneumonia, 2/2 chronic aspiration vs atypical bacterial vs fungal. Worsened despite diuresis, ventilator support and broad spectrum abx. Given immunocompromised  (neutropenic) state on admission, fungal infection is considered. Cultures have not revealed an organism. Considered bronch, however patient is not stable enough for this and still thrombocytopenia. Doubt patchy infiltrations (seen on CT) and fever are due to TRALI (had multiple blood transfusions). Hem added steroids in case it was TRALI, but now discontinued as this is unlikely. Continue antifungal therapy. Held vanc due to supratherapeutic level (> 40) and is now in renal failure. Vanc initially continued instead of using linezolid due to concerns for thrombocytopenia. Has been on very high O2 requirements since intubation on 2/18. ID and pulm  "on board for co-management.         Aspiration pneumonia    Initially treated for CAP. Patient is a heavy alcohol drinker  Likely to be aspiration pneumonia complicated by immunocompromised state. Fungal infx?  Antibiotics broadened recently by ID. Antifungals added. New resp Cx sent to lab showed normal resp rosie.         Acute renal failure    Likely due to ATN with hypoperfusion in setting of septic shock, now worsened by supratherapeutic vanc level. Shock resolved and is not on vasopressor support.  Hold IVFs as he is evidently volume overload. Failed trial of lasix. Although no hyperkalemia, patient is in renal failure. BUN >100 and climbing.   Nephrology on board for co-management. Not a candidate for long term HD  UOP scant for the past few days        Acquired pancytopenia    Patient's mother reports he is a heavy alcohol consumer and barely eats  Rapid HIV is negative. Hep C + with RNA of 75. Also folate, iron and B12 deficient. Combination of these can cause marrow failure  Hem/Onc on board for co-management. Is s/p Granix for neutropenia x 1. Neutropenia resolved.  Iron given with RBCs when GI bleed was present. Also provided with platelets. No active bleeding at this time.   Placed on folic acid and B12 supplementation.        On mechanically assisted ventilation    Terminally extubated        Other schizophrenia    Reported by patient's mother. Is unkept and lived in poor living conditions. Unknown medical regimen but I suspect he was not compliant.        Other neutropenia    2/2 marrow failure        Alcohol dependence, continuous    As reported by patient's mother        Malnutrition of moderate degree    Tube feeds held 2/2 high residuals        Chronic hepatitis C virus infection    Reportedly patient was aware of "cirrhosis" diagnosis. Has Hep C and is heavy alcohol consumer. Hep C RNA 75  Is not in liver failure.         Pneumonia of left lower lobe due to infectious organism    As discussed " above        Essential hypertension    All anti-HTN medications held for now.           VTE Risk Mitigation     None          Critical care time spent on the evaluation and treatment of severe organ dysfunction, review of pertinent labs and imaging studies, discussions with consulting providers and discussions with patient/family: 60 minutes.    Nancy Wilder MD  Department of Hospital Medicine   [facility]

## 2018-02-28 NOTE — EICU
Vent Day # 11     57 y/o M Hep C, septic shock secondary to multilobar pneumonia.  Intubated 2/18 after failed Bipap. Pancytopenia  Also with GIB.  Still high oxygen requirement sedated on propofol and fentanyl.     2/28/2018 05:41   POC PH 7.259 (LL)   POC PCO2 38.0   POC PO2 111 (H)   POC BE -9   POC HCO3 17.0 (L)   POC SATURATED O2 98   POC TCO2 18 (L)   FiO2 50   Vt 430   PEEP 14       · Continue to titrate down FiO2 and PEEP based on ARDSnet vent protocol  · Noted plans of discussing with family regarding goals of care

## 2018-02-28 NOTE — NURSING
CBG done, 79, MD notified. Hypoglycemia protocol initiated and will check cbg Q 6 and continue to monitor.

## 2018-02-28 NOTE — PLAN OF CARE
Problem: Patient Care Overview  Goal: Plan of Care Review  Outcome: Ongoing (interventions implemented as appropriate)  Pt remains in ICU, VSS/afebrile/ CVP ~ 12-15. Remains on vent, FIo2 decreased to 50% today, no other changes made. Remains on fentanyl and propofol for comfort, titrated down today. Minimal output from william continues 80 mg lasix given; flexi-seal no longer with good output now removed. Residual 1800 this am, TF on hold til tomorrow, now checking CBG Q 6 hours. Family meeting with palliative care planned for tomorrow @ 1230. Safety and skin integrity maintained with precautions in place. POC reviewed with family at bedside, questions answered and support provided.

## 2018-02-28 NOTE — ASSESSMENT & PLAN NOTE
Intubated on 2.18.18 with acute hypoxemic respiratory failure 2/2 Multilobar PNA.    FiO2 =50%, PEEP = 14 with O2 Sats>90%  · Continue low tidal volume lung protective ventilation per ARDSNet protocol.   · Continue antibiotics for treatment of multilobar pneumonia per ID recs.  · Minimal progress weaning from the ventilator over the course of the last week.  · His profound renal failure is contributing to his respiratory failure.  · Volume overload  · Worsening hyperkalemia  · Poor candidate for HD

## 2018-02-28 NOTE — NURSING
Call placed to [facility] to notify of withdrawal of care, instructed to call them when there is cardiac death.

## 2018-03-08 NOTE — PHYSICIAN QUERY
"PT Name: Dwight Bolton  MR #: [id number]    Physician Query Form - Heart  Condition Clarification     CDS/: Mary Heredia RN CDI     Contact information: [email or web address]  This form is a permanent document in the medical record.     Query Date: March 8, 2018    By submitting this query, we are merely seeking further clarification of documentation. Please utilize your independent clinical judgment when addressing the question(s) below.    The medical record contains the following   Indicators     Supporting Clinical Findings Location in Medical Record    BNP     X EF 55-60%   Echo 2/16   X Radiology findings 2/21 -  Persistent bilateral patchy consolidation and left pleural effusion.   Chest x ray   X Echo Results CONCLUSIONS     1 - TDS.     2 - Normal left ventricular systolic function (EF 55-60%).     3 - No wall motion abnormalities.     4 - Concentric remodeling.     5 - Trivial tricuspid regurgitation.     6 - Trivial pericardial effusion without hemodynamic compromise.    Echo 2/16    "Ascites" documented     X "SOB" or "PINEDA" documented He has been feeling weak "for a while" and is always short of breath but says that he came here because he had a fall today.    H&P    "Hypoxia" documented     X Heart Failure documented Hx CHF in EICU Note Saundra Beck MD at 2/18/2018  6:39 AM   X "Edema" documented He exhibits no edema   H&P   X Diuretics/Meds Furosemide tablet 40 mg daily 2/14 - 1000 - 2/18 - 0832                      Injection 20 mg 2/17 1615                           "        40 mg 2/18 0934                                    40 mg 2/20 2341                                    40 mg 2/21 0817                                    80 mg 2/24 1613                                    80 mg 2/27 1800,  2/28 1315  (2 doses)   Medication sheets    Treatment:     X Other:  Acute renal failure    Likely due to prerenal etiology with hypoperfusion in setting of septic shock. Shock resolved and is not on vasopressor " support  Stop IVF as patient is hypervolemic. Trial of lasix. Strict I/Os  Will monitor            Pt still requiring PEEP of 10 and FIO2 60%. Trial of IV lasix for hypervolemia. Transfuse PRBCs. No vasopressor support required at this time    Renal function declined significantly until failure. Deemed not a candidate for dialysis. Persistently with maximal vent support (FiO2 60-70%, high PEEP) since intubation (2/18). Met with mother, daughter and two sons. Discussed poor prognosis despite life support. Agreed with DNR. Palliative care consulted to discuss withdrawal of life saving interventions. Family meeting 2/28 with terminal extubation ~1400. Comfort measures in place. Time of death 1548 on 2/28/2018 from respiratory failure from severe sepsis 2/2 pneumonia and renal insufficiency.    Hospital medicine note 2/20 536 pm                    Discharge summary       Provider, please specify diagnosis or diagnoses associated with above clinical findings.                                 [ x ] Acute Diastolic Heart Failure ( EF > 40)*  [  ] Acute on Chronic Diastolic Heart Failure( EF > 40)*  [  ] Chronic Diastolic Heart Failure (EF > 40)*  [  ] Other Type of Heart Failure (please specify type): _________________________  [  ] Heart Failure Ruled Out  [  ] Other (please specify): ___________________________________  [  ] Clinically Undetermined            *American Heart Association                                                                                                          Please document in your progress notes daily for the duration of treatment until resolved and include in your discharge summary.

## 2018-04-12 LAB
ACID FAST MOD KINY STN SPEC: NORMAL
MYCOBACTERIUM SPEC QL CULT: NORMAL
MYCOBACTERIUM SPEC QL CULT: NORMAL

## 2018-04-22 LAB
ACID FAST MOD KINY STN SPEC: NORMAL
MYCOBACTERIUM SPEC QL CULT: NORMAL

## 2018-06-01 LAB
ACID FAST SUSCEPTIBILITY, SLOW GROWER: ABNORMAL
SPECIMEN SOURCE AND ORGANISM NAME: ABNORMAL

## 2018-07-06 LAB
ACID FAST SUSCEPTIBILITY, SLOW GROWER: ABNORMAL
SPECIMEN SOURCE AND ORGANISM NAME: ABNORMAL

## 2019-05-20 NOTE — ASSESSMENT & PLAN NOTE
Subjective:   Edilma Whiting is a 69 y.o. female      Chief Complaint   Patient presents with    Blood Pressure Check        History of present illness:  Ms. Kin Rodriguez returns in follow up. She has tolerated the Valsartan with only minimal dizziness during the day, so I suggested she take it at night. Her blood pressure is excellent today, so I do not really want to change it. We are up to date on checking her lipids. Really her only problems remain hypertension and hyperlipidemia primarily. She has some osteopenia and does have some gallstones, but is asymptomatic. She denies new cardiorespiratory, GI or  symptoms. I would continue her present medications as is. She will check with Alexys in six months' time for her checkup. Patient Active Problem List    Diagnosis Date Noted    Dyslipidemia 01/20/2019     Priority: 1 - One    Essential hypertension 01/20/2019     Priority: 1 - One    Long-term current use of high risk medication other than anticoagulant 01/21/2019     Priority: 3 - Three    Vasovagal syncope 01/20/2019     Priority: 3 - Three    Calculus of gallbladder without cholecystitis without obstruction 01/20/2019     Priority: 3 - Three    Osteopenia 08/23/2017     Priority: 3 - Three    Fibrocystic breast disease 01/20/2019    Annual physical exam 10/10/2017      No past surgical history on file.    No Known Allergies   Family History   Problem Relation Age of Onset    Lung Disease Mother     Parkinsonism Father     Dementia Father       Social History     Socioeconomic History    Marital status:      Spouse name: Not on file    Number of children: Not on file    Years of education: Not on file    Highest education level: Not on file   Occupational History    Not on file   Social Needs    Financial resource strain: Not on file    Food insecurity:     Worry: Not on file     Inability: Not on file    Transportation needs:     Medical: Not on file     Non-medical: This patient meets criteria for sepsis given fever, tachycardia, leukopenia, and pneumonia; there is no evidence of end-organ dysfunction.  Blood and urine cultures are pending;on  broad spectrum antibiotics.   Not on file   Tobacco Use    Smoking status: Never Smoker    Smokeless tobacco: Never Used   Substance and Sexual Activity    Alcohol use: No     Frequency: Never    Drug use: No    Sexual activity: Yes     Partners: Male     Birth control/protection: None   Lifestyle    Physical activity:     Days per week: Not on file     Minutes per session: Not on file    Stress: Not on file   Relationships    Social connections:     Talks on phone: Not on file     Gets together: Not on file     Attends Oriental orthodox service: Not on file     Active member of club or organization: Not on file     Attends meetings of clubs or organizations: Not on file     Relationship status: Not on file    Intimate partner violence:     Fear of current or ex partner: Not on file     Emotionally abused: Not on file     Physically abused: Not on file     Forced sexual activity: Not on file   Other Topics Concern    Not on file   Social History Narrative    Not on file     Outpatient Medications Marked as Taking for the 5/20/19 encounter (Office Visit) with Michelle Lilly MD   Medication Sig Dispense Refill    valsartan (DIOVAN) 80 mg tablet Take 1 Tab by mouth daily. 30 Tab 5    atorvastatin (LIPITOR) 20 mg tablet Take 1 Tab by mouth daily. 90 Tab 3        Review of Systems              Constitutional:  She denies fever, weight loss, sweats or fatigue. HEENT:  No blurred or double vision, headache or dizziness. No difficulty with swallowing, taste, speech or smell. Respiratory:  No cough, wheezing or shortness of breath. No sputum production. Cardiac:  Denies chest pain, palpitations, unexplained indigestion, syncope, edema, PND or orthopnea. GI:  No changes in bowel movements, no abdominal pain, no bloating, anorexia, nausea, vomiting or heartburn. :  No frequency or dysuria. Denies incontinence. Extremities:  No joint pain, stiffness or swelling. Skin:  No recent rashes or mole changes.   Neurological:  No numbness, tingling, burning paresthesias or loss of motor strength. No syncope, dizziness, frequent headaches or memory loss. Objective:     Vitals:    05/20/19 1057   BP: 132/76   Pulse: 64   Temp: 97.5 °F (36.4 °C)   TempSrc: Oral   SpO2: 96%   Weight: 155 lb (70.3 kg)   Height: 5' 5\" (1.651 m)   PainSc:   0 - No pain       Body mass index is 25.79 kg/m². Physical Examination:              General Appearance:  Well-developed, well-nourished, no acute  distress. HEENT:     Ears:  The TMs and ear canals were clear. Eyes:  The pupillary responses were normal.  Extraocular muscle function intact. Lids and conjunctiva not injected. Neck:  Supple without thyromegaly or adenopathy. No JVD noted. Lungs:  Clear to auscultation and percussion. Cardiac:  Regular rate and rhythm without murmur. GI: nontender w/o mass. Normal BS's. Extremities:  No clubbing, cyanosis or edema. Skin:  No rash or unusual mole changes noted. Neurological:  Grossly normal.            Assessment/Plan:   Impressions:      ICD-10-CM ICD-9-CM    1. Essential hypertension K76 636.0 METABOLIC PANEL, BASIC      CANCELED: METABOLIC PANEL, BASIC   2. Dyslipidemia E78.5 272.4         Plan:  1. Continue present meds  2. Discussed lifestyle modifications including Na restriction, low carb/fat diet, weight reduction and exercise (at least a walking program). Follow-up and Dispositions    · Return in about 6 months (around 11/20/2019) for CPE  JOVAN.            Orders Placed This Encounter    METABOLIC PANEL, BASIC (Bebo Roque MD

## 2019-12-21 NOTE — PROGRESS NOTES
[facility]  Hematology/Oncology  Progress Note    Patient Name: Dwight Bolton  Admission Date: 2018  Hospital Length of Stay: 14 days  Code Status: DNR     Subjective:     Interval History:      2018: pt made DNR/DNI - planning WLS.   2018: pt placed on paralytic agt. Fever resolved. Had CT chest  2018: continue to have elevated temp. Hypoxia on vent.   2018: Continue to have fever.   2018: remained intubated in icu. +  fever   2019: developed respiratory failure and septic shock leading to icu admission. Off Granix   2018: states feeling better. Fever improving        Oncology Treatment Plan:/   [No treatment plan]    Medications:  Continuous Infusions:   fentanyl 17.5 mL/hr at 18    propofol 35 mcg/kg/min (18)     Scheduled Meds:   albuterol-ipratropium 2.5mg-0.5mg/3mL  3 mL Nebulization Q6H    chlorhexidine  15 mL Mouth/Throat BID    cyanocobalamin  1,000 mcg Per OG tube Daily    fluconazole (DIFLUCAN) IVPB  200 mg Intravenous Q24H    folic acid  1 mg Per OG tube Daily    meropenem (MERREM) IVPB  1 g Intravenous Q8H    pantoprazole  40 mg Intravenous BID    pneumoc 13-serafin conj-dip cr(PF)  0.5 mL Intramuscular Once     PRN Meds:acetaminophen, albuterol-ipratropium 2.5mg-0.5mg/3mL, cloNIDine, [] fentaNYL **FOLLOWED BY** fentaNYL, gelatin adsorbable 100cm top sponge, influenza, ondansetron, promethazine (PHENERGAN) IVPB, ramelteon, senna-docusate 8.6-50 mg     Review of Systems     Unable to obtain- pt in icu on a vent     Objective:     Vital Signs (Most Recent):  Temp: 97.7 °F (36.5 °C) (185)  Pulse: 82 (18)  Resp: 16 (18)  BP: 115/62 (18)  SpO2: (!) 94 % (18) Vital Signs (24h Range):  Temp:  [97.3 °F (36.3 °C)-98.1 °F (36.7 °C)] 97.7 °F (36.5 °C)  Pulse:  [73-91] 82  Resp:  [16-30] 16  SpO2:  [91 %-98 %] 94 %  BP: (108-137)/(58-78) 115/62     Weight: 106.8 kg  (235 lb 7.2 oz)  Body mass index is 36.88 kg/m².  Body surface area is 2.25 meters squared.      Intake/Output Summary (Last 24 hours) at 02/26/18 0711  Last data filed at 02/26/18 0600   Gross per 24 hour   Intake           3108.3 ml   Output              559 ml   Net           2549.3 ml       Physical Exam    General: In ICU on a vent.       Significant Labs:   CBC:     Recent Labs  Lab 02/25/18  0158 02/26/18  0230   WBC 16.08* 22.29*   HGB 9.1* 8.9*   HCT 27.7* 26.7*   PLT 68* 75*   , CMP:     Recent Labs  Lab 02/25/18  0158 02/25/18  1835 02/26/18  0230    138 137   K 4.5 4.6 4.9    102 100   CO2 22* 20* 20*   * 131* 129*   * 127* 142*   CREATININE 4.8* 5.6* 6.2*   CALCIUM 7.5* 7.5* 7.5*   PROT 6.3  --  6.1   ALBUMIN 1.8* 1.8* 1.8*   BILITOT 0.5  --  0.5   ALKPHOS 75  --  81   AST 74*  --  75*   ALT 20  --  25   ANIONGAP 15 16 17*   EGFRNONAA 12* 10* 9*   , Coagulation:     Recent Labs  Lab 02/25/18  0145 02/26/18  0230   INR 1.0 1.0   , Haptoglobin: No results for input(s): HAPTOGLOBIN in the last 48 hours., Immunology: No results for input(s): SPEP, MINAL, LUCIEN, FREELAMBDALI in the last 48 hours., LDH: No results for input(s): LDHCSF, BFSOURCE in the last 48 hours., Reticulocytes: No results for input(s): RETIC in the last 48 hours., Tumor Markers: No results for input(s): PSA, CEA, , AFPTM, [id number],  in the last 48 hours.    Invalid input(s): ALGTM, Uric Acid No results for input(s): URICACID in the last 48 hours. and Urine Studies:   No results for input(s): COLORU, APPEARANCEUA, PHUR, SPECGRAV, PROTEINUA, GLUCUA, KETONESU, BILIRUBINUA, OCCULTUA, NITRITE, UROBILINOGEN, LEUKOCYTESUR, RBCUA, WBCUA, BACTERIA, SQUAMEPITHEL, HYALINECASTS in the last 48 hours.    Invalid input(s): AMBER    Diagnostic Results:    Current Facility-Administered Medications   Medication Dose Route Frequency Provider Last Rate Last Dose    acetaminophen oral solution 650 mg  650 mg Per NG tube Q6H  81yo F with AS s/p TAVR, CAD s/p stents, paroxysmal atrial fibrillation s/p PPM, COPD presented from [facility] [place] c/o increased b/l LE swelling with associated fatigue and decreased ambulation along with SOB for last 2 weeks. she was found to be in atrial fib on admission , UA positive started on empirical iv abx for suspected UTI . cardiology consulted , pt is also with CHF on admission likely due to rapid atrial fib iv lasix initiated , rate improved Pt had fever 12/12 am sepsis protocol initiated , blood cx ordered , Urine cx is positive MRSA  ,ID consulted  rocephin stopped vanco started and to complete doxycycline.  Pt is with normal EF , moderate MR and mod to severe TR.  ultimately PANCHO discharge    #CHF acute on chronic diastolic CHF     CXR improved but likely has chronic changes    BNP downtrending     IV lasix     cardiology following    check CT chest to r/o pneumonia    #MRSA positive UTI / sepsis resolved    ID eval--completed 7 day course of vancomycin/doxycyline.    #Atrial fib with RVR ---improved      change toprol 50mg to metoprolol 25TID    bp borderline low so missing doses    digoxin added    not on AC due to fall risk and h/o intracranial bleed     #DVT prophylaxis  - heparin SC     debility: PT following  ultimately pancho    dispo once HF and HR improved PRN Tamia Michaels MD   650 mg at 02/21/18 1848    albuterol-ipratropium 2.5mg-0.5mg/3mL nebulizer solution 3 mL  3 mL Nebulization Q6H Kirk Vicente MD   3 mL at 02/26/18 0001    albuterol-ipratropium 2.5mg-0.5mg/3mL nebulizer solution 3 mL  3 mL Nebulization Q4H PRN Theodora Monson MD   3 mL at 02/17/18 2020    chlorhexidine 0.12 % solution 15 mL  15 mL Mouth/Throat BID Mable Johnson MD   15 mL at 02/25/18 2104    cloNIDine tablet 0.1 mg  0.1 mg Oral TID PRN Theodora Monson MD        cyanocobalamin tablet 1,000 mcg  1,000 mcg Per OG tube Daily Miriam Horan MD   1,000 mcg at 02/25/18 0824    fentaNYL (SUBLIMAZE) 2,500 mcg in dextrose 5 % 250 mL infusion   Intravenous Continuous Ivan Esquivel MD 17.5 mL/hr at 02/26/18 0600      fentaNYL injection 50 mcg  50 mcg Intravenous Q1H PRN Mable Johnson MD   50 mcg at 02/18/18 0933    fluconazole (DIFLUCAN) IVPB 200 mg 100 mL  200 mg Intravenous Q24H Miriam Horan MD   200 mg at 02/25/18 1621    folic acid tablet 1 mg  1 mg Per OG tube Daily Miriam Horan MD   1 mg at 02/25/18 0824    gelatin adsorbable 100cm top sponge sponge 1 applicator  1 applicator Topical (Top) BID PRN Ivan Esquivel MD   1 applicator at 02/18/18 1754    influenza (FLUZONE,FLUARIX QUADRIVALENT) vaccine 0.5 mL  0.5 mL Intramuscular vaccine x 1 dose Shandra Shepherd MD        meropenem injection 1 g  1 g Intravenous Q8H Sinan Gomez DO   1 g at 02/26/18 0632    ondansetron injection 8 mg  8 mg Intravenous Q8H PRN Theodora Monson MD   8 mg at 02/15/18 1547    pantoprazole injection 40 mg  40 mg Intravenous BID Miriam Horan MD   40 mg at 02/25/18 2104    pneumoc 13-serafin conj-dip cr(PF) 0.5 mL  0.5 mL Intramuscular Once Shandra Shepherd MD        promethazine (PHENERGAN) 12.5 mg in dextrose 5 % 50 mL IVPB  12.5 mg Intravenous Q6H PRN Theodora Monson MD        propofol (DIPRIVAN) 10 mg/mL infusion  5 mcg/kg/min Intravenous Continuous Mable Johnson,  MD 22.4 mL/hr at 02/26/18 0631 35 mcg/kg/min at 02/26/18 0631    ramelteon tablet 8 mg  8 mg Oral Nightly PRN Theodora Monson MD        senna-docusate 8.6-50 mg per tablet 1 tablet  1 tablet Oral BID PRN Theodora Monson MD             Assessment/Plan:     Active Diagnoses:    Diagnosis Date Noted POA    PRINCIPAL PROBLEM:  Acute respiratory failure with hypoxia and hypercarbia [J96.01, J96.02] 02/18/2018 Yes    Alcohol dependence, continuous [F10.20] 02/23/2018 Yes    Other neutropenia [D70.8] 02/23/2018 Yes    Other schizophrenia [F20.89] 02/23/2018 Yes    Acute renal failure [N17.9] 02/19/2018 Yes    Malnutrition of moderate degree [E44.0] 02/19/2018 Yes    GIB (gastrointestinal bleeding) [K92.2] 02/18/2018 Yes    Septic shock [A41.9, R65.21] 02/18/2018 Yes    Chronic hepatitis C virus infection [B18.2] 02/15/2018 Yes    Pneumonia of left lower lobe due to infectious organism [J18.1] 02/14/2018 Yes    Aspiration pneumonia [J69.0] 02/12/2018 Yes    Acquired pancytopenia [D61.818] 02/12/2018 Yes    Essential hypertension [I10] 02/12/2018 Yes     Chronic      Problems Resolved During this Admission:    Diagnosis Date Noted Date Resolved POA    Hypokalemia [E87.6] 02/12/2018 02/23/2018 Yes       Mr. Bolton, 56 YO man with possible CAP found to have pancytopenia. Low albumin, elevated AST      1. Pancytopenia: multifactorial including infection, malnutrition.    - as WLS planned- No blood products     Discussed with Dr. Stephenson  Will sign off.         Anthony Ramachandran M.D  Internal Medicine & Geriatric Medicine  Hematology & Oncology  Palliative Medicine    [address], [address]  [place] [address] [phone] (Office)  [phone] (Fax)

## 2020-10-23 NOTE — PLAN OF CARE
Problem: Patient Care Overview  Goal: Plan of Care Review  Outcome: Ongoing (interventions implemented as appropriate)  Remains intubated and to ventilator, FiO2 continues at 60%  Patient remains on diprivan and fentanyl drips.  VSS  Continues to receive tube feeds, with low residuals.  Vancomycin level remains elevated, vancomycin discontinued. Urine output very low, nephrology consulted.  Lasix 80mg given with poor results.   Family visits and updated on patient condition per Dr Stephenson           Severe protein-calorie malnutrition

## 2023-11-21 NOTE — PLAN OF CARE
"[id number]}        TELEPSYCHIATRY: SUBSEQUENT EVALUATION     ASSESSMENT AND PLAN:     DIAGNOSES & PROBLEMS:  Schizoaffective Disorder, chronic, acute exacerbation  Suicide attempt by overdose on lighter fluid - likely    In Summary:  - Patient with history of schizoaffective disorder, off psych meds for some time, presents status post suspected suicide attempt.  He now minimizes this.  He continues to endorse passive suicidal ideation and auditory hallucinations at this time.  He is noted to be anxious during interview.       Plan:  - Patient currently on psych hold, awaiting inpt psych placement.  Agree with psych hosp at this time.  Attempted to contact ru for further collateral on two numbers in chart - unable to reach.  He is back on psychotropics - tolerating well - continue.     With reasonable medical certainty, based on history, chart review, available collateral information, and a present-state examination:  - the patient currently meets the criteria for psychiatric hospitalization  - once medically cleared, seek admission for safety/stabilization  - enact/provide 1:1 monitoring  - continue psychotropic regimen as prescribed       PRESENTATION:     Tray Atwood is a 52 y.o. patient seen for a follow up psychiatric evaluation.  An interval history of the presenting illness (HPI) was obtained, and a pertinent psychiatric and medical review of systems was performed.    Per Chart:  Telepsych Consultation 11/18 by Dr. Curtis - note reviewed    Per Patient:  - drank lighter fluid  - thought it was water  - denies drinking the lighter fluid purposefully  - denies active suicidal ideation/homicidal ideation   - when asked about comment "not wanting to go further in life" - he endorses this  - endorses passive suicidal ideation - but attempts to minimize  - off his psychotropics - "I always think I can do this on my own"  - "bipolar 2 and schizophrenia"  - never happy, nothing ever goes like it is suppose to   - "why " Problem: Patient Care Overview  Goal: Plan of Care Review  Outcome: Ongoing (interventions implemented as appropriate)  Patient remains intubated; PRVC/60%/20/450/+10. Responds to pain; doesn't follow commands. NSR on the monitor. No signs of active bleeding. Pt received one unit of PRBCs and one unit of platelets this shift; pt tolerated well. Propofol and Fentanyl remain for sedation. Martinez CDI; UO WNL. Flexi-seal in place. No new skin breakdown, falls, or injuries this shift.           "can't my life be cheery"  - lives with ru  - on disability  - tried to call mental health a week ago - won't be able to get in for several weeks  - slacked off on his drinking - now only a beer every other day - due to ulcer (use to be 12 pack daily)  - use to take xanax in past  - feels better on medication  - longstanding tremor    Collateral:   Per Nurse:  - anxious when awake    REVIEW OF SYSTEMS:  I[]I Patient unable or unwilling to provide any ROS.    [x] Y  [] N  sleep disturbance: *2-3 hours at home - sleeping more in hospital*    [x] Y  [] N  appetite/weight change: **  Positive for: decreased appetite  [x] Y  [] N  fatigue/anergia: **    [x] Y  [] N  impairment in focus/concentration: **       [x] Y  [] N  depression: **  Positive for: depressed mood, worthlessness Negative for: hopelessness  [x] Y  [] N  anxiety/worry: *"I feel like a ticking time bomb"*     [x] Y  [] N  somatically preoccupied: *perseverative on health concerns*   [x] Y  [] N  dysregulated mood/behavior: **  Positive for: irritability   [] Y  [x] N  manic symptomatology: **     [x] Y  [] N  psychosis: *"the voices drive me crazy"*  Positive for: auditory hallucinations Negative for: paranoia        CURRENT PSYCHOTROPIC REGIMEN:  I[x]I Y  I[]I N  I[]I U    Olanzapine 10mg bid  Venlafaxine XR 150mg daily  Trazodone 100mg bedtime      PERTINENT PAST HISTORY:     The patient's past psychiatric, family, social and medical history have been reviewed and updated as appropriate within the electronic medical record system.   Problem List:  2023-11: Suicidal ideations  2023-11: Accidental poisoning by petroleum fuels and   2023-11: Hypokalemia  2023-11: BOBY (acute kidney injury)  2023-11: Chronic respiratory failure with hypoxia  2023-07: Acute respiratory failure with hypoxia and hypercapnia  2023-07: Bipolar 1 disorder with moderate mary  2022-01: Panlobular emphysema  2022-01: Supplemental " oxygen dependent  2021-12: Abscess of bursa of left foot  2021-12: Cellulitis of left ankle  2021-10: Hypertension  2021-08: COPD with acute exacerbation  2021-04: Hyperglycemia, drug-induced  2021-04: COPD (chronic obstructive pulmonary disease)  2020-05: Peptic stricture of esophagus  2019-06: Acute on chronic respiratory failure with hypoxia and   hypercapnia  2018-08: Hypoxia  2018-07: Schizophrenia  2018-07: Polysubstance abuse  2018-01: Alcohol abuse  2017-12: Major depressive disorder  2017-12: Suicidal behavior with attempted self-injury  2017-10: Avascular necrosis of bone of hip  2017-09: Leukocytosis  2017-09: Anxiety      The electronic chart was reviewed and updated as appropriate.  See Medcard for details.    Current Facility-Administered Medications:     acetaminophen tablet 500 mg, 500 mg, Oral, Q6H PRN, NICOLLE Fortune MD    albuterol-ipratropium 2.5 mg-0.5 mg/3 mL nebulizer solution 3 mL, 3 mL, Nebulization, Q4H PRN, NICOLLE Fortune MD    albuterol-ipratropium 2.5 mg-0.5 mg/3 mL nebulizer solution 3 mL, 3 mL, Nebulization, Q6H WAKE, Konstantin Wang MD, 3 mL at 11/20/23 1331    aluminum-magnesium hydroxide-simethicone 200-200-20 mg/5 mL suspension 30 mL, 30 mL, Oral, QID PRN, NICOLLE Fortune MD    cetirizine tablet 10 mg, 10 mg, Oral, QHS, NICOLLE Fortune MD, 10 mg at 11/19/23 2100    dextrose 10% bolus 125 mL 125 mL, 12.5 g, Intravenous, PRN, NICOLLE Fortune MD    dextrose 10% bolus 250 mL 250 mL, 25 g, Intravenous, PRN, NICOLLE Fortune MD    enoxaparin injection 40 mg, 40 mg, Subcutaneous, Daily, NICOLLE Fortune MD, 40 mg at 11/20/23 1723    fluticasone furoate-vilanteroL 100-25 mcg/dose diskus inhaler 1 puff, 1 puff, Inhalation, Daily, NICOLLE Fortune MD, 1 puff at 11/20/23 0733    folic acid tablet 1 mg, 1 mg, Oral, Daily, NICOLLE Fortune MD, 1 mg at 11/20/23 0830    glucagon (human recombinant) injection 1 mg, 1 mg, Intramuscular, PRN, NICOLLE Fortune MD    glucose  "chewable tablet 16 g, 16 g, Oral, PRN, NICOLLE Fortune MD    glucose chewable tablet 24 g, 24 g, Oral, PRN, NICOLLE Fortune MD    melatonin tablet 6 mg, 6 mg, Oral, Nightly PRN, NICOLLE Fortune MD    montelukast tablet 10 mg, 10 mg, Oral, QHS, NICOLLE Fortune MD, 10 mg at 11/19/23 2100    naloxone 0.4 mg/mL injection 0.02 mg, 0.02 mg, Intravenous, PRN, NICOLLE Fortune MD    OLANZapine zydis disintegrating tablet 10 mg, 10 mg, Oral, BID, NICOLLE Fortune MD, 10 mg at 11/20/23 0830    ondansetron injection 4 mg, 4 mg, Intravenous, Q8H PRN, NICOLLE Fortune MD    pantoprazole EC tablet 40 mg, 40 mg, Oral, Daily, NICOLLE Fortune MD, 40 mg at 11/20/23 0830    polyethylene glycol packet 17 g, 17 g, Oral, Daily, NICOLLE Fortune MD    potassium, sodium phosphates 280-160-250 mg packet 2 packet, 2 packet, Oral, QID (AC & HS), Jf Esquivel MD, 2 packet at 11/20/23 1723    predniSONE tablet 10 mg, 10 mg, Oral, Daily, NICOLLE Fortune MD, 10 mg at 11/20/23 0830    prochlorperazine injection Soln 5 mg, 5 mg, Intravenous, Q6H PRN, NICOLLE Fortune MD    senna-docusate 8.6-50 mg per tablet 1 tablet, 1 tablet, Oral, BID PRN, NICOLLE Fortune MD    simethicone chewable tablet 80 mg, 1 tablet, Oral, QID PRN, NICOLLE Fortune MD    sodium chloride 0.9% flush 10 mL, 10 mL, Intravenous, Q12H PRN, NICOLLE Fortune MD    thiamine tablet 100 mg, 100 mg, Oral, Daily, NICOLLE Fortune MD, 100 mg at 11/20/23 0830    traZODone tablet 100 mg, 100 mg, Oral, QHS, NICOLLE Fortune MD, 100 mg at 11/19/23 2100    venlafaxine 24 hr capsule 150 mg, 150 mg, Oral, Daily, NICOLLE Fortune MD, 150 mg at 11/20/23 0830    Additional Relevant History, As Applicable:       EXAMINATION:     /87   Pulse 65   Temp 98.1 °F (36.7 °C) (Oral)   Resp 14   Ht 5' 11" (1.803 m)   Wt 94.1 kg (207 lb 7.3 oz)   SpO2 97%   BMI 28.93 kg/m²     MENTAL STATUS EXAMINATION:  General Appearance & Behavior: in hospital [facility], " cooperative  Involuntary Movements and Motor Activity: minor tremor noted  Speech & Language: spontaneous, rambling at times  Mood: anxious and depressed  Affect: appears anxious, oddly related  Thought Process & Associations: tangential and perseverative, ruminative  Thought Content & Perceptions: +auditory hallucinations   Sensorium and Cognition: alert with clear sensorium  Insight & Judgment: both impaired  Reliability: The patient is deemed to be an unreliable historian. **      RISK MANAGEMENT:     Risk Parameters:  I[x]I Y  I[]I N  I[]I U  I[]I A  Suicidal Ideation/Behavior: **   I[]I Y  I[x]I N  I[]I U  I[]I A  Homicidal Ideation/Behavior: **  I[]I Y  I[x]I N  I[]I U  I[]I A  Violence: **  I[]I Y  I[x]I N  I[]I U  I[]I A  Self-Injurious Behavior: **    I[x]I Y  I[]I N  I[]I U  I[]I A  I[]I N/A  Minimization of Risk Parameters Suspected/Evident: **  I[]I Y  I[x]I N  I[]I U  I[]I A  I[]I N/A  Exaggeration of Risk Parameters Suspected/Evident: **    Current risk is judged to be:   I[]I Low    I[]I Moderate   I[x]I High    [x] Y  [] N  I[]I U  I[]I N/A  Danger to Self:   [] Y  [x] N  I[]I U  I[]I N/A  Danger to Others:   [x] Y  [] N  I[]I U  I[]I N/A  Grave Disability:        Shane Bellamy MD  Department of Psychiatry, [facility] Board Certified, Psychiatry and Addiction Medicine      MANAGEMENT:     I[]I Y = Yes / Present / Endorses.  I[]I N = No / Absent / Denies.  I[]I U = Unknown / Unable to Assess / Unwilling to Participate.  I[]I A = Ambiguity Exists / Accuracy Uncertain.  I[]I D = Denial or Minimization is Suspected/Evident.  I[]I N/A = Non-Applicable.    Chart Review: Available documentation has been reviewed, and pertinent elements of the chart have been incorporated into this evaluation where appropriate.  Last Epic encounter with me: Visit date not found.    [x] In cases of emergencies (e.g. SI/HI resulting in danger to self or others, functioning deteriorates to the level of grave  disability), call 911 or [phone], or present to the emergency department for immediate assistance.  [x] Patient should not operate a motor vehicle or heavy machinery if effects of medications or underlying symptoms/condition impair the ability to safely do so.  [x] Comply with ANY/ALL medication fully as prescribed/instructed and report ANY/ALL suspected adverse effects to appropriate health care providers.    Written material has been provided to supplement, augment, and reinforce any discussions and interventions, via the AVS and/or other pre-printed handouts.  Alcohol, Tobacco, and Drug Counseling, as well as applicable resources, has been provided, as warranted.  Shared medical decision making and informed consent are the hallmark and bedrock of good clinical care, and as such have been employed and obtained, respectively, to the degree possible.  Risk Mitigation Strategies, Harm Reduction Techniques, and Safety Netting are important interventions that can reduce acute and chronic risk, and as such have been employed to the degree possible.  Prescription Drug Management entails the review, recommendation, or consideration without recommendation of medications, and as such was employed during the encounter.  Additional Psychoeducation has been provided, as warranted.        DIAGNOSTIC TESTING:     Glu 112 (H)  11/20/2023   HgA1c 5.4  3/8/2023    Na 145  11/20/2023  Cr 0.7  11/20/2023  BUN 5 (L)  11/20/2023    GFR >60.0  11/20/2023     Alb 2.9 (L)  11/20/2023   T Bili 0.3  11/20/2023   Alk Phos 45 (L)  11/20/2023   AST 10  11/20/2023   ALT 15  11/20/2023     Ammonia *   *   Amylase *   *   Lipase 44  11/17/2023    TSH 0.929  3/8/2023   Free T4 0.96  3/8/2023     Prolactin *   *   CPK 91  11/18/2023   Troponin I <0.006  7/26/2023     PT 10.9  8/23/2022   INR 1.0  8/23/2022     WBC 9.30  11/20/2023   Hgb 12.8 (L)  11/20/2023   HCT 39.4 (L)  11/20/2023     11/20/2023   ANC 6.0; 64.2;    11/20/2023     Cholesterol 280 (H)  5/4/2021   Triglycerides 202 (H)  5/4/2021   HDL 77 (H)  5/4/2021   .6 (H)  5/4/2021     B12 *   *   Folate *   *   Thiamine *   *   Vit D *   *      HIV 1/2 Ag/Ab Non-reactive  2/22/2023   Hep C Non-reactive  2/22/2023   RPR *   *    Lithium *   *   VPA *   *   Clozapine *   *     Alcohol (Urine) <10  11/19/2023   Benzodiazepines Negative  11/19/2023   Barbiturates Negative  11/19/2023   Cannabis Negative  11/19/2023   Cocaine Negative  11/19/2023   Amphetamines Negative  11/19/2023   PCP Negative  11/19/2023   Opiates Negative  11/19/2023   Methadone Negative  11/19/2023   Buprenorphine *   *   Fentanyl *   *     Ethanol <10  11/17/2023  PETH *   *   EtG *   *   GGT *   *   MCV 92  11/20/2023    UPT *   *    145 109 5 á 112   3.6 27 0.7      8.2 1.9   ??   1.7     9.30 ñ 12.8 á 215    39.4      10.9 á 1.0   25.4        Results for orders placed or performed during the hospital encounter of 11/17/23   EKG 12-lead    Collection Time: 11/18/23  1:32 AM    Narrative    Test Reason : E87.6,    Vent. Rate : 069 BPM     Atrial Rate : 069 BPM     P-R Int : 138 ms          QRS Dur : 082 ms      QT Int : 456 ms       P-R-T Axes : 058 065 067 degrees     QTc Int : 488 ms    Normal sinus rhythm  Prolonged QT  Abnormal ECG  When compared with ECG of 04-SEP-2023 17:13,  Vent. rate has decreased BY  40 BPM  Confirmed by Jamie Osorio MD ([id number]) on 11/20/2023 11:54:32 AM    Referred By: [email or web address]   SELF           Confirmed By:Jamie Osorio MD       No results found for this or any previous visit.    TELEPSYCHIATRY:     Patient agreeable to consultation via telepsychiatry.    This consultation was requested by Jf Esquivel MD.  The location of the consulting psychiatrist is: [place], [place]  The patient location is:  [facility] ICU  Consultation Setting: intensive care unit  Also present with the patient at the time of the evaluation: patient evaluated  alone    Inpatient consult to Telemedicine - Psyc  Consult performed by: Shane Bellamy MD  Consult ordered by: Jf Esquivel MD        Consult Start Time: 11/20/2023 18:01 CST  Consult End Time: 11/20/2023 18:28 CST

## 2025-06-25 NOTE — PLAN OF CARE
Problem: Patient Care Overview  Goal: Plan of Care Review  Outcome: Ongoing (interventions implemented as appropriate)  Patient experienced tachypnea, increased work of breathing, and was minimally responsive which required intubation at 0915. TLC placed. Bleeding at central line site required pressure dressing and surgiseal. Propofol and fentanyl for sedation, weaned at tolerated. Levophed initially started  Titrated off at 1604. Skin remains intact other than incontinence dermatitis to buttocks. Martinez with good UOP. 2 units PRBCs and 1 unit of platelets given. Mother on emergency contact was unable to be contacted, Matheus Johnson and Alvin signed emergency blood and TLC consents. No injuries this shift. Afebrile.        None